# Patient Record
Sex: FEMALE | Race: WHITE | NOT HISPANIC OR LATINO | Employment: OTHER | URBAN - METROPOLITAN AREA
[De-identification: names, ages, dates, MRNs, and addresses within clinical notes are randomized per-mention and may not be internally consistent; named-entity substitution may affect disease eponyms.]

---

## 2017-02-02 ENCOUNTER — TRANSFERRED RECORDS (OUTPATIENT)
Dept: HEALTH INFORMATION MANAGEMENT | Facility: CLINIC | Age: 72
End: 2017-02-02

## 2017-03-07 ENCOUNTER — TRANSFERRED RECORDS (OUTPATIENT)
Dept: HEALTH INFORMATION MANAGEMENT | Facility: CLINIC | Age: 72
End: 2017-03-07

## 2017-03-21 ENCOUNTER — TRANSFERRED RECORDS (OUTPATIENT)
Dept: HEALTH INFORMATION MANAGEMENT | Facility: CLINIC | Age: 72
End: 2017-03-21

## 2017-04-12 ENCOUNTER — HOSPITAL ENCOUNTER (OUTPATIENT)
Dept: MAMMOGRAPHY | Facility: CLINIC | Age: 72
Discharge: HOME | End: 2017-04-12
Attending: INTERNAL MEDICINE | Admitting: INTERNAL MEDICINE
Payer: MEDICARE

## 2017-04-12 ENCOUNTER — OFFICE VISIT (OUTPATIENT)
Dept: INTERNAL MEDICINE | Facility: CLINIC | Age: 72
End: 2017-04-12
Payer: COMMERCIAL

## 2017-04-12 VITALS
DIASTOLIC BLOOD PRESSURE: 68 MMHG | BODY MASS INDEX: 30.7 KG/M2 | SYSTOLIC BLOOD PRESSURE: 118 MMHG | HEART RATE: 70 BPM | OXYGEN SATURATION: 97 % | HEIGHT: 66 IN | TEMPERATURE: 97.7 F | WEIGHT: 191 LBS

## 2017-04-12 DIAGNOSIS — Z00.00 ROUTINE GENERAL MEDICAL EXAMINATION AT A HEALTH CARE FACILITY: Primary | ICD-10-CM

## 2017-04-12 DIAGNOSIS — E55.9 VITAMIN D DEFICIENCY: ICD-10-CM

## 2017-04-12 DIAGNOSIS — N39.0 RECURRENT UTI: ICD-10-CM

## 2017-04-12 DIAGNOSIS — N39.0 URINARY TRACT INFECTION WITHOUT HEMATURIA, SITE UNSPECIFIED: ICD-10-CM

## 2017-04-12 DIAGNOSIS — Z78.0 MENOPAUSE: ICD-10-CM

## 2017-04-12 DIAGNOSIS — M85.80 OSTEOPENIA: ICD-10-CM

## 2017-04-12 DIAGNOSIS — R82.90 NONSPECIFIC FINDING ON EXAMINATION OF URINE: ICD-10-CM

## 2017-04-12 DIAGNOSIS — Z12.31 VISIT FOR SCREENING MAMMOGRAM: ICD-10-CM

## 2017-04-12 DIAGNOSIS — E03.9 ACQUIRED HYPOTHYROIDISM: ICD-10-CM

## 2017-04-12 LAB
ALBUMIN SERPL-MCNC: 3.7 G/DL (ref 3.4–5)
ALBUMIN UR-MCNC: NEGATIVE MG/DL
ALP SERPL-CCNC: 76 U/L (ref 40–150)
ALT SERPL W P-5'-P-CCNC: 22 U/L (ref 0–50)
ANION GAP SERPL CALCULATED.3IONS-SCNC: 8 MMOL/L (ref 3–14)
APPEARANCE UR: ABNORMAL
AST SERPL W P-5'-P-CCNC: 17 U/L (ref 0–45)
BACTERIA #/AREA URNS HPF: ABNORMAL /HPF
BILIRUB SERPL-MCNC: 0.5 MG/DL (ref 0.2–1.3)
BILIRUB UR QL STRIP: NEGATIVE
BUN SERPL-MCNC: 15 MG/DL (ref 7–30)
CALCIUM SERPL-MCNC: 9.8 MG/DL (ref 8.5–10.1)
CHLORIDE SERPL-SCNC: 104 MMOL/L (ref 94–109)
CHOLEST SERPL-MCNC: 184 MG/DL
CO2 SERPL-SCNC: 29 MMOL/L (ref 20–32)
COLOR UR AUTO: YELLOW
CREAT SERPL-MCNC: 0.9 MG/DL (ref 0.52–1.04)
ERYTHROCYTE [DISTWIDTH] IN BLOOD BY AUTOMATED COUNT: 14.1 % (ref 10–15)
GFR SERPL CREATININE-BSD FRML MDRD: 62 ML/MIN/1.7M2
GLUCOSE SERPL-MCNC: 96 MG/DL (ref 70–99)
GLUCOSE UR STRIP-MCNC: NEGATIVE MG/DL
HCT VFR BLD AUTO: 46.8 % (ref 35–47)
HDLC SERPL-MCNC: 74 MG/DL
HGB BLD-MCNC: 15.3 G/DL (ref 11.7–15.7)
HGB UR QL STRIP: NEGATIVE
KETONES UR STRIP-MCNC: NEGATIVE MG/DL
LDLC SERPL CALC-MCNC: 92 MG/DL
LEUKOCYTE ESTERASE UR QL STRIP: ABNORMAL
MCH RBC QN AUTO: 29 PG (ref 26.5–33)
MCHC RBC AUTO-ENTMCNC: 32.7 G/DL (ref 31.5–36.5)
MCV RBC AUTO: 89 FL (ref 78–100)
NITRATE UR QL: NEGATIVE
NONHDLC SERPL-MCNC: 110 MG/DL
PH UR STRIP: 6 PH (ref 5–7)
PLATELET # BLD AUTO: 252 10E9/L (ref 150–450)
POTASSIUM SERPL-SCNC: 4.7 MMOL/L (ref 3.4–5.3)
PROT SERPL-MCNC: 7.7 G/DL (ref 6.8–8.8)
RBC # BLD AUTO: 5.28 10E12/L (ref 3.8–5.2)
RBC #/AREA URNS AUTO: ABNORMAL /HPF (ref 0–2)
SODIUM SERPL-SCNC: 141 MMOL/L (ref 133–144)
SP GR UR STRIP: <=1.005 (ref 1–1.03)
TRIGL SERPL-MCNC: 92 MG/DL
TSH SERPL DL<=0.005 MIU/L-ACNC: 1 MU/L (ref 0.4–4)
URN SPEC COLLECT METH UR: ABNORMAL
UROBILINOGEN UR STRIP-ACNC: 0.2 EU/DL (ref 0.2–1)
WBC # BLD AUTO: 5.9 10E9/L (ref 4–11)
WBC #/AREA URNS AUTO: ABNORMAL /HPF (ref 0–2)

## 2017-04-12 PROCEDURE — 81001 URINALYSIS AUTO W/SCOPE: CPT | Performed by: INTERNAL MEDICINE

## 2017-04-12 PROCEDURE — G0202 SCR MAMMO BI INCL CAD: HCPCS

## 2017-04-12 PROCEDURE — 87186 SC STD MICRODIL/AGAR DIL: CPT | Performed by: INTERNAL MEDICINE

## 2017-04-12 PROCEDURE — 99397 PER PM REEVAL EST PAT 65+ YR: CPT | Performed by: INTERNAL MEDICINE

## 2017-04-12 PROCEDURE — 36415 COLL VENOUS BLD VENIPUNCTURE: CPT | Performed by: INTERNAL MEDICINE

## 2017-04-12 PROCEDURE — 87088 URINE BACTERIA CULTURE: CPT | Performed by: INTERNAL MEDICINE

## 2017-04-12 PROCEDURE — 80053 COMPREHEN METABOLIC PANEL: CPT | Performed by: INTERNAL MEDICINE

## 2017-04-12 PROCEDURE — 82306 VITAMIN D 25 HYDROXY: CPT | Performed by: INTERNAL MEDICINE

## 2017-04-12 PROCEDURE — 84443 ASSAY THYROID STIM HORMONE: CPT | Performed by: INTERNAL MEDICINE

## 2017-04-12 PROCEDURE — 87086 URINE CULTURE/COLONY COUNT: CPT | Performed by: INTERNAL MEDICINE

## 2017-04-12 PROCEDURE — 85027 COMPLETE CBC AUTOMATED: CPT | Performed by: INTERNAL MEDICINE

## 2017-04-12 PROCEDURE — 80061 LIPID PANEL: CPT | Performed by: INTERNAL MEDICINE

## 2017-04-12 RX ORDER — LEVOTHYROXINE SODIUM 100 UG/1
100 TABLET ORAL DAILY
Qty: 90 TABLET | Refills: 3 | Status: SHIPPED | OUTPATIENT
Start: 2017-04-12 | End: 2017-09-05

## 2017-04-12 NOTE — NURSING NOTE
"Chief Complaint   Patient presents with     Wellness Visit     Fasting Px       Initial /68  Pulse 70  Temp 97.7  F (36.5  C) (Oral)  Ht 5' 6\" (1.676 m)  Wt 191 lb (86.6 kg)  LMP 07/22/1997  SpO2 97%  BMI 30.83 kg/m2 Estimated body mass index is 30.83 kg/(m^2) as calculated from the following:    Height as of this encounter: 5' 6\" (1.676 m).    Weight as of this encounter: 191 lb (86.6 kg).  Medication Reconciliation: complete   Rosalind Valadez MA      "

## 2017-04-12 NOTE — MR AVS SNAPSHOT
After Visit Summary   4/12/2017    Kierra Mcmullen    MRN: [id number]           Patient Information     Date Of Birth          1945        Visit Information        Provider Department      4/12/2017 8:00 AM Matt Whitney MD [facility]        Today's Diagnoses     Routine general medical examination at a health care facility    -  1    Acquired hypothyroidism        Osteopenia        Vitamin D deficiency        Menopause           Follow-ups after your visit        Your next 10 appointments already scheduled     May 23, 2017 10:00 AM CDT   New Patient Visit with Jacob Connell MD   [facility] ([facility])    [address]  [address]  [facility] [address] [phone]              Future tests that were ordered for you today     Open Future Orders        Priority Expected Expires Ordered    DX Hip/Pelvis/Spine Routine  4/12/2018 4/12/2017            Who to contact     If you have questions or need follow up information about today's clinic visit or your schedule please contact [facility] directly at [phone].  Normal or non-critical lab and imaging results will be communicated to you by MyChart, letter or phone within 4 business days after the clinic has received the results. If you do not hear from us within 7 days, please contact the clinic through [employer] or phone. If you have a critical or abnormal lab result, we will notify you by phone as soon as possible.  Submit refill requests through [employer] or call your pharmacy and they will forward the refill request to us. Please allow 3 business days for your refill to be completed.          Additional Information About Your Visit        MyChart Information     [employer] gives you secure access to your electronic health record. If you see a primary care provider, you can also send messages to your care team and make appointments.  "If you have questions, please call your primary care clinic.  If you do not have a primary care provider, please call [phone] and they will assist you.        Care EveryWhere ID     This is your Care EveryWhere ID. This could be used by other organizations to access your [place] medical records  [id number]        Your Vitals Were     Pulse Temperature Height Last Period Pulse Oximetry BMI (Body Mass Index)    70 97.7  F (36.5  C) (Oral) 5' 6\" (1.676 m) 07/22/1997 97% 30.83 kg/m2       Blood Pressure from Last 3 Encounters:   04/12/17 118/68   05/05/16 101/62   04/26/16 118/76    Weight from Last 3 Encounters:   04/12/17 191 lb (86.6 kg)   04/26/16 187 lb 6.4 oz (85 kg)   07/09/15 186 lb (84.4 kg)              We Performed the Following     *UA reflex to Microscopic and Culture ([place] and [facility] (except [place] and [place])     CBC with platelets     Comprehensive metabolic panel     Lipid panel reflex to direct LDL     TSH with free T4 reflex     Vitamin D Deficiency          Where to get your medicines      Some of these will need a paper prescription and others can be bought over the counter.  Ask your nurse if you have questions.     Bring a paper prescription for each of these medications     levothyroxine 100 MCG tablet          Primary Care Provider Office Phone # Fax #    Matt Whitney [phone]       [facility] [address] [place] [address]        Thank you!     Thank you for choosing [facility]  for your care. Our goal is always to provide you with excellent care. Hearing back from our patients is one way we can continue to improve our services. Please take a few minutes to complete the written survey that you may receive in the mail after your visit with us. Thank you!             Your Updated Medication List - Protect others around you: Learn how to safely use, store and throw away your medicines at " www.disposemymeds.org.          This list is accurate as of: 4/12/17  8:37 AM.  Always use your most recent med list.                   Brand Name Dispense Instructions for use    Biotin 5000 MCG Caps          calcium + D 600-200 MG-UNIT Tabs   Generic drug:  calcium carbonate-vitamin D     30    1 TABLET DAILY       calcium polycarbophil 625 MG tablet    FIBERCON     Take 2 tablets by mouth daily       GLUCOSAMINE CHONDROITIN COMPLX PO      Take 2 tablets by mouth daily. 750/600 .       levothyroxine 100 MCG tablet    SYNTHROID/LEVOTHROID    90 tablet    Take 1 tablet (100 mcg) by mouth daily       MULTI-DAY Tabs      one PO QD       vitamin D 2000 UNITS tablet     100 tablet    Take 2,000 Units by mouth daily

## 2017-04-12 NOTE — PROGRESS NOTES
SUBJECTIVE:                                                            Kierra Mcmullen is a 72 year old female who presents for Preventive Visit.      Are you in the first 12 months of your Medicare coverage?  No    Physical   Annual:     Getting at least 3 servings of Calcium per day::  Yes    Bi-annual eye exam::  Yes    Dental care twice a year::  Yes    Sleep apnea or symptoms of sleep apnea::  None    Diet::  Regular (no restrictions)    Frequency of exercise::  6-7 days/week    Duration of exercise::  30-45 minutes    Taking medications regularly::  Yes    Medication side effects::  None    Additional concerns today::  YES      COGNITIVE SCREEN  1) Repeat 3 items (Banana, Sunrise, Chair)    2) Clock draw: NORMAL, abnormal time in the clock  3) 3 item recall: Recalls 3 objects  Results: Recall 3 objects but clock time abnormal    Mini-CogTM Copyright S Marshall. Licensed by the author for use in [facility]; reprinted with permission (mariana@[facility]). All rights reserved.        Reviewed and updated as needed this visit by clinical staff         Reviewed and updated as needed this visit by Provider        Social History   Substance Use Topics     Smoking status: Former Smoker     Packs/day: 1.00     Years: 25.00     Types: Cigarettes     Quit date: 1/1/1987     Smokeless tobacco: Never Used      Comment: 1985     Alcohol use 6.0 oz/week      Comment: 2-3 glasses of wine per wk       The patient does not drink >3 drinks per day nor >7 drinks per week.        PROBLEMS TO ADD ON...    Has hypothyroidism. On Synthroid. No heat /cold intolerance, heart palpitations, weight loss/ gain , heart palpitations, change in bowel habits.  Has h/o osteopenia. Has stopped vit D, takes calcium.     Today's PHQ-2 Score: 0  PHQ-2 ( 1999 Pfizer) 4/11/2017   Little interest or pleasure in doing things Not at all   Feeling down, depressed or hopeless Not at all   PHQ-2 Score 0       Do you feel safe in your environment -  Yes    Do you have a Health Care Directive?: Yes: Advance Directive has been received and scanned.    Current providers sharing in care for this patient include:   Patient Care Team:  Matt Whitney MD as PCP - General (Internal Medicine)      Hearing impairment: No    Ability to successfully perform activities of daily living: Yes, no assistance needed     Fall risk:  Fallen 2 or more times in the past year?: No  Any fall with injury in the past year?: No    Home safety:  none identified      The following health maintenance items are reviewed in Epic and correct as of today:  Health Maintenance   Topic Date Due     HEPATITIS C SCREENING  04/12/1963     TSH Q1 YEAR (NO INBASKET)  04/26/2017     FALL RISK ASSESSMENT  04/26/2017     MAMMO Q1 YR INBASKET MESSAGE  04/26/2017     INFLUENZA VACCINE (SYSTEM ASSIGNED)  09/01/2017     ADVANCE DIRECTIVE PLANNING Q5 YRS (NO INBASKET)  06/19/2020     LIPID SCREEN Q5 YR FEMALE (SYSTEM ASSIGNED)  04/26/2021     COLONOSCOPY Q5 YR INBASKET MESSAGE  05/05/2021     TETANUS Q10 YR  10/01/2022     DEXA SCAN SCREENING (SYSTEM ASSIGNED)  Completed     PNEUMOCOCCAL  Completed              ROS:  C: NEGATIVE for fever, chills, change in weight  I: NEGATIVE for worrisome rashes, moles or lesions  E: NEGATIVE for vision changes or irritation  E/M: NEGATIVE for ear, mouth and throat problems  R: NEGATIVE for significant cough or SOB  B: NEGATIVE for masses, tenderness or discharge  CV: NEGATIVE for chest pain, palpitations or peripheral edema  GI: NEGATIVE for nausea, abdominal pain, heartburn, or change in bowel habits  : NEGATIVE for frequency, dysuria, or hematuria  M: NEGATIVE for significant arthralgias or myalgia  N: NEGATIVE for weakness, dizziness or paresthesias  E: NEGATIVE for temperature intolerance, skin/hair changes  H: NEGATIVE for bleeding problems  P: NEGATIVE for changes in mood or affect    Problem list, Medication list, Allergies, and Medical/Social/Surgical  "histories reviewed in EPIC and updated as appropriate.  OBJECTIVE:                                                            LMP 07/22/1997 Estimated body mass index is 30.71 kg/(m^2) as calculated from the following:    Height as of 4/26/16: 5' 5.5\" (1.664 m).    Weight as of 4/26/16: 187 lb 6.4 oz (85 kg).  EXAM:   GENERAL: healthy, alert and no distress  EYES: Eyes grossly normal to inspection, PERRL and conjunctivae and sclerae normal  HENT: ear canals and TM's normal, nose and mouth without ulcers or lesions  NECK: no adenopathy, no asymmetry, masses, or scars and thyroid normal to palpation  RESP: lungs clear to auscultation - no rales, rhonchi or wheezes  BREAST: normal without masses, tenderness or nipple discharge and no palpable axillary masses or adenopathy  CV: regular rate and rhythm, normal S1 S2, no S3 or S4, no murmur, click or rub, no peripheral edema and peripheral pulses strong  ABDOMEN: soft, nontender, no hepatosplenomegaly, no masses and bowel sounds normal  MS: no gross musculoskeletal defects noted, no edema  SKIN: no suspicious lesions or rashes  NEURO: Normal strength and tone, mentation intact and speech normal  PSYCH: mentation appears normal, affect normal/bright    ASSESSMENT / PLAN:                                                                ICD-10-CM    1. Routine general medical examination at a health care facility Z00.00 levothyroxine (SYNTHROID/LEVOTHROID) 100 MCG tablet     Lipid panel reflex to direct LDL     CBC with platelets     Comprehensive metabolic panel     TSH with free T4 reflex     Vitamin D Deficiency     *UA reflex to Microscopic and Culture ([facility] (except [place] and [place])     DX Hip/Pelvis/Spine   2. Acquired hypothyroidism E03.9 CBC with platelets     Comprehensive metabolic panel     TSH with free T4 reflex     *UA reflex to Microscopic and Culture ([facility] (except [place] and [place])     DX " "Hip/Pelvis/Spine   3. Osteopenia M85.80 Vitamin D Deficiency     DX Hip/Pelvis/Spine   4. Vitamin D deficiency E55.9 Vitamin D Deficiency     DX Hip/Pelvis/Spine   5. Menopause Z78.0 DX Hip/Pelvis/Spine       End of Life Planning:  Patient currently has an advanced directive: Yes.  Practitioner is supportive of decision.    COUNSELING:  Reviewed preventive health counseling, as reflected in patient instructions       Regular exercise       Healthy diet/nutrition       Vision screening       Hearing screening       Dental care       Colon cancer screening        Estimated body mass index is 30.71 kg/(m^2) as calculated from the following:    Height as of 4/26/16: 5' 5.5\" (1.664 m).    Weight as of 4/26/16: 187 lb 6.4 oz (85 kg).     reports that she quit smoking about 30 years ago. Her smoking use included Cigarettes. She has a 25.00 pack-year smoking history. She has never used smokeless tobacco.      Appropriate preventive services were discussed with this patient, including applicable screening as appropriate for cardiovascular disease, diabetes, osteopenia/osteoporosis, and glaucoma.  As appropriate for age/gender, discussed screening for colorectal cancer, prostate cancer, breast cancer, and cervical cancer. Checklist reviewing preventive services available has been given to the patient.    Reviewed patients plan of care and provided an AVS. The Intermediate Care Plan ( asthma action plan, low back pain action plan, and migraine action plan) for Kierra meets the Care Plan requirement. This Care Plan has been established and reviewed with the Patient.    Counseling Resources:  ATP IV Guidelines  Pooled Cohorts Equation Calculator  Breast Cancer Risk Calculator  FRAX Risk Assessment  ICSI Preventive Guidelines  Dietary Guidelines for Americans, 2010  USDA's MyPlate  ASA Prophylaxis  Lung CA Screening    Matt Whitney MD  [facility]  Answers for HPI/ROS submitted by the patient on 4/11/2017 "   Q1: Little interest or pleasure in doing things: 0=Not at all  Q2: Feeling down, depressed or hopeless: 0=Not at all  PHQ-2 Score: 0

## 2017-04-13 LAB — DEPRECATED CALCIDIOL+CALCIFEROL SERPL-MC: 49 UG/L (ref 20–75)

## 2017-04-14 ENCOUNTER — TELEPHONE (OUTPATIENT)
Dept: INTERNAL MEDICINE | Facility: CLINIC | Age: 72
End: 2017-04-14

## 2017-04-14 DIAGNOSIS — N39.0 RECURRENT UTI: ICD-10-CM

## 2017-04-14 LAB
BACTERIA SPEC CULT: ABNORMAL
MICRO REPORT STATUS: ABNORMAL
MICROORGANISM SPEC CULT: ABNORMAL
SPECIMEN SOURCE: ABNORMAL

## 2017-04-14 RX ORDER — CEFUROXIME AXETIL 500 MG/1
500 TABLET ORAL 2 TIMES DAILY
Qty: 20 TABLET | Refills: 0 | Status: SHIPPED | OUTPATIENT
Start: 2017-04-14 | End: 2017-04-14

## 2017-04-14 RX ORDER — CEFUROXIME AXETIL 500 MG/1
500 TABLET ORAL 2 TIMES DAILY
Qty: 20 TABLET | Refills: 0 | Status: SHIPPED | OUTPATIENT
Start: 2017-04-14 | End: 2017-06-19

## 2017-04-14 NOTE — TELEPHONE ENCOUNTER
From 4/13/17 dictation in results notes:  She does have some Cefuroxime 500 mg x 5 days.  She would like a prescription to take out of town with her when the C & S come back.    Pt calling in asking if the Cefuroxime is the correct medication to be taking. She does have one more refill on Cefuroxime left.   [place]aving [place]. Is currently taking the Cefuroxime.  Ok to leave detailed message on cell phone.  Provider please review and advise.

## 2017-05-17 ENCOUNTER — TRANSFERRED RECORDS (OUTPATIENT)
Dept: HEALTH INFORMATION MANAGEMENT | Facility: CLINIC | Age: 72
End: 2017-05-17

## 2017-05-19 ASSESSMENT — ENCOUNTER SYMPTOMS
SINUS PAIN: 0
TASTE DISTURBANCE: 0
DYSURIA: 1
DIFFICULTY URINATING: 0
HEMATURIA: 0
FLANK PAIN: 1
TROUBLE SWALLOWING: 0
SINUS CONGESTION: 1
SMELL DISTURBANCE: 0
HOARSE VOICE: 0
NECK MASS: 0
SORE THROAT: 0

## 2017-05-22 ENCOUNTER — RADIANT APPOINTMENT (OUTPATIENT)
Dept: BONE DENSITY | Facility: CLINIC | Age: 72
End: 2017-05-22
Attending: INTERNAL MEDICINE
Payer: COMMERCIAL

## 2017-05-22 DIAGNOSIS — E55.9 VITAMIN D DEFICIENCY: ICD-10-CM

## 2017-05-22 DIAGNOSIS — Z78.0 MENOPAUSE: ICD-10-CM

## 2017-05-22 DIAGNOSIS — M85.80 OSTEOPENIA: ICD-10-CM

## 2017-05-22 DIAGNOSIS — E03.9 ACQUIRED HYPOTHYROIDISM: ICD-10-CM

## 2017-05-22 DIAGNOSIS — Z00.00 ROUTINE GENERAL MEDICAL EXAMINATION AT A HEALTH CARE FACILITY: ICD-10-CM

## 2017-05-22 PROCEDURE — 77080 DXA BONE DENSITY AXIAL: CPT | Performed by: INTERNAL MEDICINE

## 2017-05-23 ENCOUNTER — OFFICE VISIT (OUTPATIENT)
Dept: UROLOGY | Facility: CLINIC | Age: 72
End: 2017-05-23
Payer: COMMERCIAL

## 2017-05-23 ENCOUNTER — HOSPITAL ENCOUNTER (OUTPATIENT)
Dept: ULTRASOUND IMAGING | Facility: CLINIC | Age: 72
Discharge: HOME | End: 2017-05-23
Attending: UROLOGY | Admitting: UROLOGY
Payer: MEDICARE

## 2017-05-23 ENCOUNTER — HOSPITAL ENCOUNTER (OUTPATIENT)
Dept: GENERAL RADIOLOGY | Facility: CLINIC | Age: 72
Discharge: HOME | End: 2017-05-23
Attending: UROLOGY | Admitting: UROLOGY
Payer: MEDICARE

## 2017-05-23 VITALS
HEART RATE: 80 BPM | DIASTOLIC BLOOD PRESSURE: 80 MMHG | HEIGHT: 66 IN | SYSTOLIC BLOOD PRESSURE: 118 MMHG | WEIGHT: 190 LBS | BODY MASS INDEX: 30.53 KG/M2

## 2017-05-23 DIAGNOSIS — Z87.440 PERSONAL HISTORY OF URINARY TRACT INFECTION: ICD-10-CM

## 2017-05-23 DIAGNOSIS — Z87.440 PERSONAL HISTORY OF URINARY TRACT INFECTION: Primary | ICD-10-CM

## 2017-05-23 LAB
ALBUMIN UR-MCNC: NEGATIVE MG/DL
APPEARANCE UR: CLEAR
BILIRUB UR QL STRIP: NEGATIVE
COLOR UR AUTO: YELLOW
GLUCOSE UR STRIP-MCNC: NEGATIVE MG/DL
HGB UR QL STRIP: ABNORMAL
KETONES UR STRIP-MCNC: NEGATIVE MG/DL
LEUKOCYTE ESTERASE UR QL STRIP: ABNORMAL
NITRATE UR QL: NEGATIVE
PH UR STRIP: 7 PH (ref 5–7)
SP GR UR STRIP: 1.01 (ref 1–1.03)
URN SPEC COLLECT METH UR: ABNORMAL
UROBILINOGEN UR STRIP-ACNC: 0.2 EU/DL (ref 0.2–1)

## 2017-05-23 PROCEDURE — 81003 URINALYSIS AUTO W/O SCOPE: CPT | Performed by: UROLOGY

## 2017-05-23 PROCEDURE — 76770 US EXAM ABDO BACK WALL COMP: CPT

## 2017-05-23 PROCEDURE — 99202 OFFICE O/P NEW SF 15 MIN: CPT | Performed by: UROLOGY

## 2017-05-23 RX ORDER — INFLUENZA A VIRUS A/VICTORIA/4897/2022 IVR-238 (H1N1) ANTIGEN (FORMALDEHYDE INACTIVATED), INFLUENZA A VIRUS A/CALIFORNIA/122/2022 SAN-022 (H3N2) ANTIGEN (FORMALDEHYDE INACTIVATED), AND INFLUENZA B VIRUS B/MICHIGAN/01/2021 ANTIGEN (FORMALDEHYDE INACTIVATED) 60; 60; 60 UG/.5ML; UG/.5ML; UG/.5ML
INJECTION, SUSPENSION INTRAMUSCULAR
Refills: 0 | COMMUNITY
Start: 2016-09-14 | End: 2017-06-19

## 2017-05-23 ASSESSMENT — PAIN SCALES - GENERAL: PAINLEVEL: NO PAIN (0)

## 2017-05-23 NOTE — LETTER
5/23/2017       RE: Kierra Mcmullen  [address] [place] [address]     Dear Colleague,    Thank you for referring your patient, Kierra Mcmullen, to the [facility] [place] [facility]. Please see a copy of my visit note below.    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 She has no known history of stones but has not had any imaging. 8 years ago she was seen with recurrent Escherichia coli cystitis and I recommended estrogen vaginal cream to help with bacterial colonization. Unfortunately, she never followed this recommendation. With her infections, she's had no pain, fever or hematuria. She has no difficulty voiding.  Other past medical history: Adenomatous polyp of colon, hypothyroidism, osteopenia, hysterectomy, former smoker  Medications: Biotin, calcium, vitamin D, Fluzone, glucosamine/chondroitin, Synthroid, multivitamin  Allergies: Sulfa  Exam: Normal appearance, normal vital signs, alert and oriented, normocephalic, normal respirations, neuro grossly intact  Urinalysis: Small leukocytes only  Assessment: Recurrent Klebsiella UTIs-rule out stones  Plan: KUB, renal ultrasound, office cystoscopy           I've asked the patient to see Xavier Austin M.D. who can recommend vaginal estrogen replacement-Estrace cream versus vaginal estrogen suppositories-I see this as a major indication since she probably has bacterial colonization. All discussed with patient and her  today    Again, thank you for allowing me to participate in the care of your patient.      Sincerely,  Jacob Connell MD

## 2017-05-23 NOTE — LETTER
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 She has no known history of stones but has not had any imaging. 8 years ago she was seen with recurrent Escherichia coli cystitis and I recommended estrogen vaginal cream to help with bacterial colonization. Unfortunately, she never followed this recommendation. With her infections, she's had no pain, fever or hematuria. She has no difficulty voiding.  Other past medical history: Adenomatous polyp of colon, hypothyroidism, osteopenia, hysterectomy, former smoker  Medications: Biotin, calcium, vitamin D, Fluzone, glucosamine/chondroitin, Synthroid, multivitamin  Allergies: Sulfa  Exam: Normal appearance, normal vital signs, alert and oriented, normocephalic, normal respirations, neuro grossly intact  Urinalysis: Small leukocytes only  Assessment: Recurrent Klebsiella UTIs-rule out stones  Plan: KUB, renal ultrasound, office cystoscopy           I've asked the patient to see Xavier Autsin M.D. who can recommend vaginal estrogen replacement-Estrace cream versus vaginal estrogen suppositories-I see this as a major indication since she probably has bacterial colonization. All discussed with patient and her  today    Jacob Connell MD

## 2017-05-23 NOTE — MR AVS SNAPSHOT
After Visit Summary   5/23/2017    Kierra Mcmullen    MRN: [id number]           Patient Information     Date Of Birth          1945        Visit Information        Provider Department      5/23/2017 10:00 AM Jacob Connell MD [facility]        Today's Diagnoses     Personal history of urinary tract infection    -  1       Follow-ups after your visit        Your next 10 appointments already scheduled     May 23, 2017 10:50 AM CDT   XR KUB with [email or web address]   [facility])    [address]  [facility] [address]   [phone]           Please bring a list of your current medicines to your exam. (Include vitamins, minerals and over-thecounter medicines.) Leave your valuables at home.  Tell your doctor if there is a chance you may be pregnant.  You do not need to do anything special for this exam.            May 23, 2017 11:00 AM CDT   US RENAL COMPLETE with [email or web address]   [facility] ([facility])    [address]  [facility] [address] [phone]           Please bring a list of your medicines (including vitamins, minerals and over-the-counter drugs). Also, tell your doctor about any allergies you may have. Wear comfortable clothes and leave your valuables at home.  You do not need to do anything special to prepare for your exam.  Please call the Imaging Department at your exam site with any questions.            Jun 08, 2017  8:50 AM CDT   Cystoscopy with Jacob Connell MD, UB CYF   [facility] ([facility])    [address]  [address]  [facility] [address]   [phone]              Future tests that were ordered for you today     Open Future Orders        Priority Expected Expires Ordered    XR KUB [[id number]] Routine  5/23/2018 5/23/2017  "   US Renal Complete [[id number]] Routine  5/23/2018 5/23/2017            Who to contact     If you have questions or need follow up information about today's clinic visit or your schedule please contact [facility] [place] directly at [phone].  Normal or non-critical lab and imaging results will be communicated to you by MyChart, letter or phone within 4 business days after the clinic has received the results. If you do not hear from us within 7 days, please contact the clinic through [employer] or phone. If you have a critical or abnormal lab result, we will notify you by phone as soon as possible.  Submit refill requests through [employer] or call your pharmacy and they will forward the refill request to us. Please allow 3 business days for your refill to be completed.          Additional Information About Your Visit        MyChart Information     [employer] gives you secure access to your electronic health record. If you see a primary care provider, you can also send messages to your care team and make appointments. If you have questions, please call your primary care clinic.  If you do not have a primary care provider, please call [phone] and they will assist you.        Care EveryWhere ID     This is your Care EveryWhere ID. This could be used by other organizations to access your [place] medical records  [id number]        Your Vitals Were     Pulse Height Last Period BMI (Body Mass Index)          80 1.664 m (5' 5.5\") 07/22/1997 31.14 kg/m2         Blood Pressure from Last 3 Encounters:   05/23/17 118/80   04/12/17 118/68   05/05/16 101/62    Weight from Last 3 Encounters:   05/23/17 86.2 kg (190 lb)   04/12/17 86.6 kg (191 lb)   04/26/16 85 kg (187 lb 6.4 oz)              We Performed the Following     UA without Microscopic [[id number]]        Primary Care Provider Office Phone # Fax #    Matt Whitney [phone]       [facility] [address]  [facility] [address]        Thank you!     Thank you for choosing [facility] [place]  for your care. Our goal is always to provide you with excellent care. Hearing back from our patients is one way we can continue to improve our services. Please take a few minutes to complete the written survey that you may receive in the mail after your visit with us. Thank you!             Your Updated Medication List - Protect others around you: Learn how to safely use, store and throw away your medicines at www.disposemymeds.org.          This list is accurate as of: 5/23/17 10:40 AM.  Always use your most recent med list.                   Brand Name Dispense Instructions for use    Biotin 5000 MCG Caps          calcium + D 600-200 MG-UNIT Tabs   Generic drug:  calcium carbonate-vitamin D     30    1 TABLET DAILY       calcium polycarbophil 625 MG tablet    FIBERCON     Take 2 tablets by mouth daily       cefuroxime 500 MG tablet    CEFTIN    20 tablet    Take 1 tablet (500 mg) by mouth 2 times daily       FLUZONE HIGH-DOSE 0.5 ML injection   Generic drug:  influenza Vac Split High-Dose      ADM 0.5ML IM UTD       GLUCOSAMINE CHONDROITIN COMPLX PO      Take 2 tablets by mouth daily. 750/600 .       levothyroxine 100 MCG tablet    SYNTHROID/LEVOTHROID    90 tablet    Take 1 tablet (100 mcg) by mouth daily       MULTI-DAY Tabs      one PO QD       vitamin D 2000 UNITS tablet     100 tablet    Take 2,000 Units by mouth daily Reported on 5/23/2017

## 2017-05-23 NOTE — PROGRESS NOTES
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 She has no known history of stones but has not had any imaging. 8 years ago she was seen with recurrent Escherichia coli cystitis and I recommended estrogen vaginal cream to help with bacterial colonization. Unfortunately, she never followed this recommendation. With her infections, she's had no pain, fever or hematuria. She has no difficulty voiding.  Other past medical history: Adenomatous polyp of colon, hypothyroidism, osteopenia, hysterectomy, former smoker  Medications: Biotin, calcium, vitamin D, Fluzone, glucosamine/chondroitin, Synthroid, multivitamin  Allergies: Sulfa  Exam: Normal appearance, normal vital signs, alert and oriented, normocephalic, normal respirations, neuro grossly intact  Urinalysis: Small leukocytes only  Assessment: Recurrent Klebsiella UTIs-rule out stones  Plan: KUB, renal ultrasound, office cystoscopy           I've asked the patient to see Xavier Austin M.D. who can recommend vaginal estrogen replacement-Estrace cream versus vaginal estrogen suppositories-I see this as a major indication since she probably has bacterial colonization. All discussed with patient and her  today

## 2017-06-19 ENCOUNTER — OFFICE VISIT (OUTPATIENT)
Dept: INTERNAL MEDICINE | Facility: CLINIC | Age: 72
End: 2017-06-19
Payer: COMMERCIAL

## 2017-06-19 VITALS
HEIGHT: 66 IN | DIASTOLIC BLOOD PRESSURE: 82 MMHG | WEIGHT: 193 LBS | BODY MASS INDEX: 31.02 KG/M2 | SYSTOLIC BLOOD PRESSURE: 122 MMHG | OXYGEN SATURATION: 97 % | HEART RATE: 67 BPM

## 2017-06-19 DIAGNOSIS — H10.31 ACUTE BACTERIAL CONJUNCTIVITIS OF RIGHT EYE: Primary | ICD-10-CM

## 2017-06-19 DIAGNOSIS — R82.90 CLOUDY URINE: ICD-10-CM

## 2017-06-19 LAB
ALBUMIN UR-MCNC: NEGATIVE MG/DL
APPEARANCE UR: ABNORMAL
BACTERIA #/AREA URNS HPF: ABNORMAL /HPF
BILIRUB UR QL STRIP: NEGATIVE
COLOR UR AUTO: YELLOW
GLUCOSE UR STRIP-MCNC: NEGATIVE MG/DL
HGB UR QL STRIP: NEGATIVE
KETONES UR STRIP-MCNC: NEGATIVE MG/DL
LEUKOCYTE ESTERASE UR QL STRIP: ABNORMAL
NITRATE UR QL: NEGATIVE
NON-SQ EPI CELLS #/AREA URNS LPF: ABNORMAL /LPF
PH UR STRIP: 5.5 PH (ref 5–7)
RBC #/AREA URNS AUTO: ABNORMAL /HPF (ref 0–2)
SP GR UR STRIP: 1.01 (ref 1–1.03)
URN SPEC COLLECT METH UR: ABNORMAL
UROBILINOGEN UR STRIP-ACNC: 0.2 EU/DL (ref 0.2–1)
WBC #/AREA URNS AUTO: ABNORMAL /HPF (ref 0–2)

## 2017-06-19 PROCEDURE — 99213 OFFICE O/P EST LOW 20 MIN: CPT | Performed by: INTERNAL MEDICINE

## 2017-06-19 PROCEDURE — 81001 URINALYSIS AUTO W/SCOPE: CPT | Performed by: INTERNAL MEDICINE

## 2017-06-19 RX ORDER — TOBRAMYCIN 3 MG/ML
1 SOLUTION/ DROPS OPHTHALMIC EVERY 4 HOURS
Qty: 1 BOTTLE | Refills: 0 | Status: SHIPPED | OUTPATIENT
Start: 2017-06-19 | End: 2017-06-26

## 2017-06-19 NOTE — NURSING NOTE
"Chief Complaint   Patient presents with     Eye Problem     Pink eye     Urinary Problem     Cloudy urine       Initial /82  Pulse 67  Ht 5' 5.5\" (1.664 m)  Wt 193 lb (87.5 kg)  LMP 07/22/1997  SpO2 97%  BMI 31.63 kg/m2 Estimated body mass index is 31.63 kg/(m^2) as calculated from the following:    Height as of this encounter: 5' 5.5\" (1.664 m).    Weight as of this encounter: 193 lb (87.5 kg).  Medication Reconciliation: complete   Rosalind Valadez MA      "

## 2017-06-19 NOTE — MR AVS SNAPSHOT
After Visit Summary   6/19/2017    Kierra Mcmullen    MRN: [id number]           Patient Information     Date Of Birth          1945        Visit Information        Provider Department      6/19/2017 11:20 AM Matt Whitney MD [facility]        Today's Diagnoses     Acute bacterial conjunctivitis of right eye    -  1    Cloudy urine           Follow-ups after your visit        Your next 10 appointments already scheduled     Jun 27, 2017  1:30 PM CDT   [facility]    [address]  [facility] [id number] [phone]           Bring a current list of meds and any records pertaining to this visit.  For Physicals, please bring immunization records and any forms needing to be filled out.  Please arrive 10 minutes early to complete paperwork.            Jul 05, 2017  9:20 AM CDT   Cystoscopy with Jacob Connell MD, UB CYF   [facility]    [address] [facility]  [address]  [facility] [id number] [phone]              Who to contact     If you have questions or need follow up information about today's clinic visit or your schedule please contact [facility] directly at [phone].  Normal or non-critical lab and imaging results will be communicated to you by MyChart, letter or phone within 4 business days after the clinic has received the results. If you do not hear from us within 7 days, please contact the clinic through MyChart or phone. If you have a critical or abnormal lab result, we will notify you by phone as soon as possible.  Submit refill requests through [employer] or call your pharmacy and they will forward the refill request to us. Please allow 3 business days for your refill to be completed.          Additional Information About Your Visit        MyChart Information      "tab ticketbroker gives you secure access to your electronic health record. If you see a primary care provider, you can also send messages to your care team and make appointments. If you have questions, please call your primary care clinic.  If you do not have a primary care provider, please call [phone] and they will assist you.        Care EveryWhere ID     This is your Care EveryWhere ID. This could be used by other organizations to access your [place] medical records  [id number]        Your Vitals Were     Pulse Height Last Period Pulse Oximetry BMI (Body Mass Index)       67 5' 5.5\" (1.664 m) 07/22/1997 97% 31.63 kg/m2        Blood Pressure from Last 3 Encounters:   06/19/17 122/82   05/23/17 118/80   04/12/17 118/68    Weight from Last 3 Encounters:   06/19/17 193 lb (87.5 kg)   05/23/17 190 lb (86.2 kg)   04/12/17 191 lb (86.6 kg)              We Performed the Following     UA reflex to Microscopic and Culture          Today's Medication Changes          These changes are accurate as of: 6/19/17 11:48 AM.  If you have any questions, ask your nurse or doctor.               Start taking these medicines.        Dose/Directions    tobramycin 0.3 % ophthalmic solution   Commonly known as:  TOBREX   Used for:  Acute bacterial conjunctivitis of right eye   Started by:  Matt Whitney MD        Dose:  1 drop   Apply 1 drop to eye every 4 hours for 7 days   Quantity:  1 Bottle   Refills:  0         These medicines have changed or have updated prescriptions.        Dose/Directions    calcium + D 600-200 MG-UNIT Tabs   This may have changed:  See the new instructions.   Generic drug:  calcium carbonate-vitamin D        1 TABLET DAILY   Quantity:  30   Refills:  0            Where to get your medicines      These medications were sent to [id number]  [place] [address] [facility] [address] [facility]  [address] [place] [address]     Phone:  [phone]     " tobramycin 0.3 % ophthalmic solution                Primary Care Provider Office Phone # Fax #    Matt Whitney [phone]       [facility] [address] [facility] [address]        Thank you!     Thank you for choosing [facility]  for your care. Our goal is always to provide you with excellent care. Hearing back from our patients is one way we can continue to improve our services. Please take a few minutes to complete the written survey that you may receive in the mail after your visit with us. Thank you!             Your Updated Medication List - Protect others around you: Learn how to safely use, store and throw away your medicines at www.disposemymeds.org.          This list is accurate as of: 6/19/17 11:48 AM.  Always use your most recent med list.                   Brand Name Dispense Instructions for use    Biotin 5000 MCG Caps          calcium + D 600-200 MG-UNIT Tabs   Generic drug:  calcium carbonate-vitamin D     30    1 TABLET DAILY       calcium polycarbophil 625 MG tablet    FIBERCON     Take 2 tablets by mouth daily       GLUCOSAMINE CHONDROITIN COMPLX PO      Take 2 tablets by mouth daily. 750/600 .       levothyroxine 100 MCG tablet    SYNTHROID/LEVOTHROID    90 tablet    Take 1 tablet (100 mcg) by mouth daily       MULTI-DAY Tabs      one PO QD       tobramycin 0.3 % ophthalmic solution    TOBREX    1 Bottle    Apply 1 drop to eye every 4 hours for 7 days

## 2017-06-19 NOTE — PROGRESS NOTES
SUBJECTIVE:                                                    Kierra Mcmullen is a 72 year old female who presents to clinic today for the following health issues:      Presents with complaints of right eye irritation, redness, mattering for 2 days. Mild pain, no eyesight change.  Has had changes in the urine with foul smell and cloudy appearance. No frequency, no burning, fever, flank pain. Has had UTI 3 months ago.   Will be seeing urology and GYN.         Problem list and histories reviewed & adjusted, as indicated.  Additional history: none    Patient Active Problem List   Diagnosis     Obesity     Prolapse of vaginal wall     Recurrent UTI     Advance Care Planning     Postmenopausal bleeding     Cystocele     Enterocele     Hypothyroidism     Osteopenia     Adenomatous polyp of colon     Degenerative arthritis of knee     Past Surgical History:   Procedure Laterality Date     ARTHROPLASTY KNEE Left 5/19/2015    Procedure: ARTHROPLASTY KNEE;  Surgeon: Daniel Mack MD;  Location:  OR     C NONSPECIFIC PROCEDURE      Cholecystectomy -- Abstracted 7/16/02     CHOLECYSTECTOMY       COLONOSCOPY N/A 5/5/2016    Procedure: COLONOSCOPY;  Surgeon: Sage Berg MD;  Location:  GI     CYSTOSCOPY  1/31/2012    Procedure:CYSTOSCOPY; Surgeon:GAMA GRIDER; Location:[facility] OR     DAVINCI HYSTERECTOMY SUPRACERVICAL, SACROCOLPOPEXY, COMBINED  1/31/2012    Procedure:COMBINED DAVINCI HYSTERECTOMY SUPRACERVICAL, SACROCOLPOPEXY; DAVINCI LAPAROSCOPIC SUPRACERVICAL HYSTERECTOMY, BILATERAL SALPINGO-OOPHORECTOMY, SACROCOLPOPEXY  WITH COLOPLASTY MESH AND CYSTOPLASTY; Surgeon:GAMA GRIDER; Location: OR     HC COLONOSCOPY THRU STOMA, DIAGNOSTIC  2006      REMOVAL OF TONSILS,<13 Y/O      Tonsils <12y.o.     HC TOOTH EXTRACTION W/FORCEP         Social History   Substance Use Topics     Smoking status: Former Smoker     Packs/day: 1.00     Years: 25.00     Types: Cigarettes     Quit date: 1/1/1987     Smokeless  "tobacco: Never Used      Comment:      Alcohol use 6.0 oz/week      Comment: 2-3 glasses of wine per wk     Family History   Problem Relation Age of Onset     Alzheimer Disease Father      CANCER Mother       non-Hodgkins lymphoma     OSTEOPOROSIS Mother      Arthritis Mother      RA     GASTROINTESTINAL DISEASE Mother      UC     OSTEOPOROSIS Sister      GASTROINTESTINAL DISEASE Sister      diverticulitis     Cardiovascular Maternal Aunt      MI     Breast Cancer Paternal Aunt          Current Outpatient Prescriptions   Medication Sig Dispense Refill     tobramycin (TOBREX) 0.3 % ophthalmic solution Apply 1 drop to eye every 4 hours for 7 days 1 Bottle 0     levothyroxine (SYNTHROID/LEVOTHROID) 100 MCG tablet Take 1 tablet (100 mcg) by mouth daily 90 tablet 3     calcium polycarbophil (FIBERCON) 625 MG tablet Take 2 tablets by mouth daily       Biotin 5000 MCG CAPS        GLUCOSAMINE CHONDROITIN COMPLX PO Take 2 tablets by mouth daily. 750/600 .        CALCIUM + D 600-200 MG-IU OR TABS 1 TABLET DAILY (Patient taking differently: 2 TABLETS DAILY) 30 0     MULTI-DAY OR TABS one PO QD         Reviewed and updated as needed this visit by clinical staff  Tobacco  Allergies  Meds       Reviewed and updated as needed this visit by Provider         ROS:  Constitutional, HEENT, cardiovascular, pulmonary, gi and gu systems are negative, except as otherwise noted.    OBJECTIVE:                                                    /82  Pulse 67  Ht 5' 5.5\" (1.664 m)  Wt 193 lb (87.5 kg)  LMP 1997  SpO2 97%  BMI 31.63 kg/m2  Body mass index is 31.63 kg/(m^2).  GENERAL: healthy, alert and no distress  EYES: Eyes grossly normal to inspection, PERRL and conjunctivae and sclerae normal  Right eye with conjunctival injection, yellow crusted discharge on eyelids   MS: no gross musculoskeletal defects noted, no edema    Diagnostic Test Results:  none      ASSESSMENT/PLAN:                                  "                     Problem List Items Addressed This Visit     None      Visit Diagnoses     Acute bacterial conjunctivitis of right eye    -  Primary    Relevant Medications    tobramycin (TOBREX) 0.3 % ophthalmic solution    Cloudy urine        Relevant Orders    UA reflex to Microscopic and Culture (Completed)           Start on ophthalmic antibiotic   Reassess if not improved   Assess for UTI, follow up with urology     Follow-Up:with results     Matt Whitney MD  [facility]

## 2017-06-27 ENCOUNTER — OFFICE VISIT (OUTPATIENT)
Dept: OBGYN | Facility: CLINIC | Age: 72
End: 2017-06-27
Payer: COMMERCIAL

## 2017-06-27 VITALS
BODY MASS INDEX: 31.08 KG/M2 | SYSTOLIC BLOOD PRESSURE: 120 MMHG | HEIGHT: 66 IN | WEIGHT: 193.4 LBS | DIASTOLIC BLOOD PRESSURE: 76 MMHG

## 2017-06-27 DIAGNOSIS — N81.10 PROLAPSE OF VAGINAL WALL: ICD-10-CM

## 2017-06-27 DIAGNOSIS — N39.0 RECURRENT UTI: Primary | ICD-10-CM

## 2017-06-27 DIAGNOSIS — K46.9 ENTEROCELE: ICD-10-CM

## 2017-06-27 DIAGNOSIS — Z71.89 ADVANCED DIRECTIVES, COUNSELING/DISCUSSION: Chronic | ICD-10-CM

## 2017-06-27 DIAGNOSIS — Z01.411 ENCOUNTER FOR GYNECOLOGICAL EXAMINATION WITH ABNORMAL FINDING: ICD-10-CM

## 2017-06-27 PROCEDURE — 99204 OFFICE O/P NEW MOD 45 MIN: CPT | Performed by: OBSTETRICS & GYNECOLOGY

## 2017-06-27 NOTE — NURSING NOTE
"Chief Complaint   Patient presents with     Consult     HRT referred by        Initial /76 (BP Location: Right arm, Patient Position: Chair, Cuff Size: Adult Regular)  Ht 5' 6\" (1.676 m)  Wt 193 lb 6.4 oz (87.7 kg)  LMP 1997  BMI 31.22 kg/m2 Estimated body mass index is 31.22 kg/(m^2) as calculated from the following:    Height as of this encounter: 5' 6\" (1.676 m).    Weight as of this encounter: 193 lb 6.4 oz (87.7 kg).  BP completed using cuff size: regular        The following HM Due: NONE      The following patient reported/Care Every where data was sent to:  P ABSTRACT QUALITY INITIATIVES [[id number]]       patient has appointment for today             "

## 2017-06-27 NOTE — MR AVS SNAPSHOT
After Visit Summary   6/27/2017    Kierra Mcmullen    MRN: [id number]           Patient Information     Date Of Birth          1945        Visit Information        Provider Department      6/27/2017 1:30 PM Xavier Austin MD [facility]        Today's Diagnoses     Recurrent UTI    -  1    Prolapse of vaginal wall        Advance Care Planning        Encounter for gynecological examination with abnormal finding        Enterocele          Care Instructions    You can reach your [place] Care Team any time of the day by calling [phone]. This number will put you in touch with the 24 hour nurse line if the clinic is closed.    To contact your OB/GYN Station Coordinator/Surgery Scheduler please call [phone]. This is a direct number for your care team between 8 a.m. and 4 p.m. Monday through Friday.    [facility] is open for your convenience:  Monday through Friday 8 a.m. to 6 p.m.  Closed weekends and all major holidays.            Follow-ups after your visit        Follow-up notes from your care team     Return in about 3 months (around 9/27/2017).      Your next 10 appointments already scheduled     Jul 05, 2017  9:20 AM CDT   Cystoscopy with Jacob Connell MD, UB CYF   [facility] ([facility])    [address]  [address]  [facility] [address] [phone]              Who to contact     If you have questions or need follow up information about today's clinic visit or your schedule please contact [facility] directly at [phone].  Normal or non-critical lab and imaging results will be communicated to you by MyChart, letter or phone within 4 business days after the clinic has received the results. If you do not hear from us within 7 days, please contact the clinic through MyChart or phone. If you have a critical or abnormal lab result, we will notify you by phone as  "soon as possible.  Submit refill requests through [employer] or call your pharmacy and they will forward the refill request to us. Please allow 3 business days for your refill to be completed.          Additional Information About Your Visit        [email or web address] Information     [employer] gives you secure access to your electronic health record. If you see a primary care provider, you can also send messages to your care team and make appointments. If you have questions, please call your primary care clinic.  If you do not have a primary care provider, please call [phone] and they will assist you.        Care EveryWhere ID     This is your Care EveryWhere ID. This could be used by other organizations to access your [place] medical records  [id number]        Your Vitals Were     Height Last Period BMI (Body Mass Index)             5' 6\" (1.676 m) 1997 31.22 kg/m2          Blood Pressure from Last 3 Encounters:   17 120/76   17 122/82   17 118/80    Weight from Last 3 Encounters:   17 193 lb 6.4 oz (87.7 kg)   17 193 lb (87.5 kg)   17 190 lb (86.2 kg)                 Today's Medication Changes          These changes are accurate as of: 17 11:59 PM.  If you have any questions, ask your nurse or doctor.               Start taking these medicines.        Dose/Directions    COMPOUND - PHARMACY TO MIX COMPOUNDED MEDICATION   Commonly known as:  CMPD RX   Used for:  Recurrent UTI   Started by:  Xavier Austin MD        Estradiol 0.011% in HRT heavy (cream base)  Place in tube and include vaginal applicator Si gm intravaginally at HS on Mon and Thurs pm as directed   Quantity:  42 g   Refills:  3         These medicines have changed or have updated prescriptions.        Dose/Directions    calcium + D 600-200 MG-UNIT Tabs   This may have changed:  See the new instructions.   Generic drug:  calcium carbonate-vitamin D        1 TABLET DAILY   Quantity:  30   Refills:  0          "   Where to get your medicines      Some of these will need a paper prescription and others can be bought over the counter.  Ask your nurse if you have questions.     Bring a paper prescription for each of these medications     COMPOUND - PHARMACY TO MIX COMPOUNDED MEDICATION                Primary Care Provider Office Phone # Fax #    Matt Whitney [phone]       [facility] [address] [facility] [address]        Equal Access to Services     SAGAR NIETO : Hadii aad ku hadasho Soomaali, waaxda luqadaha, qaybta kaalmada adeegyada, waxay idiin hayaan adeeg kharash lacheryl . So [facility] [phone].    ATENCIÓN: Si habla español, tiene a maxwell disposición servicios gratuitos de asistencia lingüística. Toya al [phone].    We comply with applicable federal civil rights laws and [place] laws. We do not discriminate on the basis of race, color, national origin, age, disability sex, sexual orientation or gender identity.            Thank you!     Thank you for choosing [facility]  for your care. Our goal is always to provide you with excellent care. Hearing back from our patients is one way we can continue to improve our services. Please take a few minutes to complete the written survey that you may receive in the mail after your visit with us. Thank you!             Your Updated Medication List - Protect others around you: Learn how to safely use, store and throw away your medicines at www.disposemymeds.org.          This list is accurate as of: 6/27/17 11:59 PM.  Always use your most recent med list.                   Brand Name Dispense Instructions for use Diagnosis    Biotin 5000 MCG Caps           calcium + D 600-200 MG-UNIT Tabs   Generic drug:  calcium carbonate-vitamin D     30    1 TABLET DAILY        calcium polycarbophil 625 MG tablet    FIBERCON     Take 2 tablets by mouth daily        COMPOUND - PHARMACY TO MIX COMPOUNDED MEDICATION    CMPD RX     42 g    Estradiol 0.011% in HRT heavy (cream base)  Place in tube and include vaginal applicator Si gm intravaginally at HS on Mon and Thurs pm as directed    Recurrent UTI       GLUCOSAMINE CHONDROITIN COMPLX PO      Take 2 tablets by mouth daily. 750/600 .        levothyroxine 100 MCG tablet    SYNTHROID/LEVOTHROID    90 tablet    Take 1 tablet (100 mcg) by mouth daily    Routine general medical examination at a health care facility       MULTI-DAY Tabs      one PO QD

## 2017-06-29 NOTE — PATIENT INSTRUCTIONS
You can reach your [place] Care Team any time of the day by calling [phone]. This number will put you in touch with the 24 hour nurse line if the clinic is closed.    To contact your OB/GYN Station Coordinator/Surgery Scheduler please call [phone]. This is a direct number for your care team between 8 a.m. and 4 p.m. Monday through Friday.    [facility] is open for your convenience:  Monday through Friday 8 a.m. to 6 p.m.  Closed weekends and all major holidays.

## 2017-07-05 ENCOUNTER — OFFICE VISIT (OUTPATIENT)
Dept: UROLOGY | Facility: CLINIC | Age: 72
End: 2017-07-05
Payer: COMMERCIAL

## 2017-07-05 VITALS — OXYGEN SATURATION: 94 % | HEIGHT: 66 IN | HEART RATE: 64 BPM | BODY MASS INDEX: 30.53 KG/M2 | WEIGHT: 190 LBS

## 2017-07-05 DIAGNOSIS — N39.0 RECURRENT UTI: Primary | ICD-10-CM

## 2017-07-05 LAB
ALBUMIN UR-MCNC: NEGATIVE MG/DL
APPEARANCE UR: CLEAR
BILIRUB UR QL STRIP: NEGATIVE
COLOR UR AUTO: YELLOW
GLUCOSE UR STRIP-MCNC: NEGATIVE MG/DL
HGB UR QL STRIP: NEGATIVE
KETONES UR STRIP-MCNC: NEGATIVE MG/DL
LEUKOCYTE ESTERASE UR QL STRIP: ABNORMAL
NITRATE UR QL: NEGATIVE
PH UR STRIP: 7 PH (ref 5–7)
SP GR UR STRIP: 1.01 (ref 1–1.03)
URN SPEC COLLECT METH UR: ABNORMAL
UROBILINOGEN UR STRIP-ACNC: 0.2 EU/DL (ref 0.2–1)

## 2017-07-05 PROCEDURE — 81003 URINALYSIS AUTO W/O SCOPE: CPT | Performed by: UROLOGY

## 2017-07-05 PROCEDURE — 52000 CYSTOURETHROSCOPY: CPT | Performed by: UROLOGY

## 2017-07-05 PROCEDURE — 99212 OFFICE O/P EST SF 10 MIN: CPT | Mod: 25 | Performed by: UROLOGY

## 2017-07-05 RX ORDER — CIPROFLOXACIN 250 MG/1
250 TABLET, FILM COATED ORAL ONCE
Qty: 1 TABLET | Refills: 0 | Status: SHIPPED | OUTPATIENT
Start: 2017-07-05 | End: 2017-07-05

## 2017-07-05 ASSESSMENT — PAIN SCALES - GENERAL: PAINLEVEL: NO PAIN (0)

## 2017-07-05 NOTE — LETTER
7/5/2017      RE: Kierra Mcmullen  [address] [place] [address]       Kierra Mcmullen returns for cystoscopy. She has a normal renal ultrasound and KUB. She will be starting vaginal estrogen cream and suppositories that have been prescribed by Dr. Austin.  Flexible cystoscopy-normal urethra and bladder, normal ureteral orifices, no diverticulum, stone, tumor or fistula  Assessment: Recurrent bacterial cystitis due to colonization  Plan: Cipro 500 mg ×1. Vaginal estrogen cream. See me p.r.n.    Jacob Connell MD

## 2017-07-05 NOTE — MR AVS SNAPSHOT
"              After Visit Summary   7/5/2017    Kierra Mcmullen    MRN: [id number]           Patient Information     Date Of Birth          1945        Visit Information        Provider Department      7/5/2017 9:20 AM Jacob Connell MD; UB CYF [facility] [place]        Today's Diagnoses     Recurrent UTI    -  1      Care Instructions         AFTER YOUR CYSTOSCOPY         You have just completed a cystoscopy, or \"cysto\", which allowed your physician to learn more about your bladder (or to remove a stent placed after surgery). We suggest that you continue to avoid caffeine, fruit juice, and alcohol for the next 24 hours, however, you are encouraged to return to your normal activities.       A few things that are considered normal after your cystoscopy:    * small amount of bleeding (or spotting) that clears within the next 24 hours    * slight burning sensation with urination    * sensation to of needing to avoid more frequently    * the feeling of \"air\" in your urine    * mild discomfort that is relieved with Tylonol        Please contact our office promptly if you:    * develop a fever above 101 degrees    * are unable to urinate    * develop bright red blood that does not stop    * severe pain or swelling        And of course, please contact our office with any concerns or questions [phone]                Follow-ups after your visit        Who to contact     If you have questions or need follow up information about today's clinic visit or your schedule please contact [facility] [place] directly at [phone].  Normal or non-critical lab and imaging results will be communicated to you by MyChart, letter or phone within 4 business days after the clinic has received the results. If you do not hear from us within 7 days, please contact the clinic through MyChart or phone. If you have a critical or abnormal lab result, we will " "notify you by phone as soon as possible.  Submit refill requests through Orgger or call your pharmacy and they will forward the refill request to us. Please allow 3 business days for your refill to be completed.          Additional Information About Your Visit        Ekaya.[email or web address] Information     Orgger gives you secure access to your electronic health record. If you see a primary care provider, you can also send messages to your care team and make appointments. If you have questions, please call your primary care clinic.  If you do not have a primary care provider, please call [phone] and they will assist you.        Care EveryWhere ID     This is your Care EveryWhere ID. This could be used by other organizations to access your [place] medical records  [id number]        Your Vitals Were     Pulse Height Last Period Pulse Oximetry BMI (Body Mass Index)       64 1.676 m (5' 6\") 07/22/1997 94% 30.67 kg/m2        Blood Pressure from Last 3 Encounters:   06/27/17 120/76   06/19/17 122/82   05/23/17 118/80    Weight from Last 3 Encounters:   07/05/17 86.2 kg (190 lb)   06/27/17 87.7 kg (193 lb 6.4 oz)   06/19/17 87.5 kg (193 lb)              We Performed the Following     CYSTOURETHROSCOPY (31499)     UA without Microscopic          Today's Medication Changes          These changes are accurate as of: 7/5/17  9:50 AM.  If you have any questions, ask your nurse or doctor.               Start taking these medicines.        Dose/Directions    ciprofloxacin 250 MG tablet   Commonly known as:  CIPRO   Used for:  Recurrent UTI   Started by:  Jacob Connell MD        Dose:  250 mg   Take 1 tablet (250 mg) by mouth once for 1 dose   Quantity:  1 tablet   Refills:  0         These medicines have changed or have updated prescriptions.        Dose/Directions    calcium + D 600-200 MG-UNIT Tabs   This may have changed:  See the new instructions.   Generic drug:  calcium carbonate-vitamin D        1 TABLET DAILY   Quantity:  " 30   Refills:  0            Where to get your medicines      These medications were sent to [facility] [place], [place] - [facility] [address]  [address] [facility] [address]     Phone:  [phone]     ciprofloxacin 250 MG tablet                Primary Care Provider Office Phone # Fax #    Matt Whitney [phone]       [facility] [address] [facility] [address]        Equal Access to Services     SAGAR NIETO : Hadii aad ku hadasho Soomaali, waaxda luqadaha, qaybta kaalmada adeegyada, waxay idiin hayaan adeeg kharash lacheryl . So [facility] [phone].    ATENCIÓN: Si habla español, tiene a maxwell disposición servicios gratuitos de asistencia lingüística. [facility] [phone].    We comply with applicable federal civil rights laws and [place] laws. We do not discriminate on the basis of race, color, national origin, age, disability sex, sexual orientation or gender identity.            Thank you!     Thank you for choosing [facility] [place]  for your care. Our goal is always to provide you with excellent care. Hearing back from our patients is one way we can continue to improve our services. Please take a few minutes to complete the written survey that you may receive in the mail after your visit with us. Thank you!             Your Updated Medication List - Protect others around you: Learn how to safely use, store and throw away your medicines at www.disposemymeds.org.          This list is accurate as of: 7/5/17  9:50 AM.  Always use your most recent med list.                   Brand Name Dispense Instructions for use Diagnosis    Biotin 5000 MCG Caps           calcium + D 600-200 MG-UNIT Tabs   Generic drug:  calcium carbonate-vitamin D     30    1 TABLET DAILY        calcium polycarbophil 625 MG tablet    FIBERCON     Take 2 tablets by mouth daily        ciprofloxacin 250 MG tablet    CIPRO    1 tablet     Take 1 tablet (250 mg) by mouth once for 1 dose    Recurrent UTI       COMPOUND - PHARMACY TO MIX COMPOUNDED MEDICATION    CMPD RX    42 g    Estradiol 0.011% in HRT heavy (cream base)  Place in tube and include vaginal applicator Si gm intravaginally at HS on Mon and Thurs pm as directed    Recurrent UTI       GLUCOSAMINE CHONDROITIN COMPLX PO      Take 2 tablets by mouth daily. 750/600 .        levothyroxine 100 MCG tablet    SYNTHROID/LEVOTHROID    90 tablet    Take 1 tablet (100 mcg) by mouth daily    Routine general medical examination at a health care facility       MULTI-DAY Tabs      one PO QD

## 2017-07-05 NOTE — PATIENT INSTRUCTIONS
"     AFTER YOUR CYSTOSCOPY         You have just completed a cystoscopy, or \"cysto\", which allowed your physician to learn more about your bladder (or to remove a stent placed after surgery). We suggest that you continue to avoid caffeine, fruit juice, and alcohol for the next 24 hours, however, you are encouraged to return to your normal activities.       A few things that are considered normal after your cystoscopy:    * small amount of bleeding (or spotting) that clears within the next 24 hours    * slight burning sensation with urination    * sensation to of needing to avoid more frequently    * the feeling of \"air\" in your urine    * mild discomfort that is relieved with Tylonol        Please contact our office promptly if you:    * develop a fever above 101 degrees    * are unable to urinate    * develop bright red blood that does not stop    * severe pain or swelling        And of course, please contact our office with any concerns or questions [phone]        "

## 2017-07-05 NOTE — PROGRESS NOTES
Kierra Mcmullen returns for cystoscopy. She has a normal renal ultrasound and KUB. She will be starting vaginal estrogen cream and suppositories that have been prescribed by Dr. Austin.  Flexible cystoscopy-normal urethra and bladder, normal ureteral orifices, no diverticulum, stone, tumor or fistula  Assessment: Recurrent bacterial cystitis due to colonization  Plan: Cipro 500 mg ×1. Vaginal estrogen cream. See me p.r.n.

## 2017-07-05 NOTE — LETTER
7/5/2017       RE: Kierra Mcmullen  [address] [place] [address]     Dear Colleague,    Thank you for referring your patient, Kierra Mcmullen, to the [facility] [place] [facility]. Please see a copy of my visit note below.    Kierra Mcmullen returns for cystoscopy. She has a normal renal ultrasound and KUB. She will be starting vaginal estrogen cream and suppositories that have been prescribed by Dr. Austin.  Flexible cystoscopy-normal urethra and bladder, normal ureteral orifices, no diverticulum, stone, tumor or fistula  Assessment: Recurrent bacterial cystitis due to colonization  Plan: Cipro 500 mg ×1. Vaginal estrogen cream. See me p.r.n.    Again, thank you for allowing me to participate in the care of your patient.      Sincerely,    Jacob Connell MD

## 2017-07-05 NOTE — NURSING NOTE
Prior to the start of the procedure and with procedural staff participation, I verbally confirmed the patient s identity using two indicators, relevant allergies, that the procedure was appropriate and matched the consent or emergent situation, and that the correct equipment/implants were available. Immediately prior to starting the procedure I conducted the Time Out with the procedural staff and re-confirmed the patient s name, procedure, and site/side. (The Joint Commission universal protocol was followed.)  Yes    Sedation (Moderate or Deep): None  Pt has signed the consent form stating that we will be doing a CYSTOSCOPY (with or without stent removal) today, and that it is the correct procedure. I verbally confirmed the patient s identity using two indicators, relevant allergies, and that the correct equipment was available. Post-op information given to the pt as needed at check-out. I have sent an appropriate antibiotic to the pharmacy in our building as recommended by the MD. ALEX Urias CMA

## 2017-07-06 ENCOUNTER — TRANSFERRED RECORDS (OUTPATIENT)
Dept: HEALTH INFORMATION MANAGEMENT | Facility: CLINIC | Age: 72
End: 2017-07-06

## 2017-07-21 DIAGNOSIS — N39.0 RECURRENT UTI: ICD-10-CM

## 2017-07-21 LAB
ALBUMIN UR-MCNC: NEGATIVE MG/DL
APPEARANCE UR: CLEAR
BACTERIA #/AREA URNS HPF: ABNORMAL /HPF
BILIRUB UR QL STRIP: NEGATIVE
COLOR UR AUTO: YELLOW
GLUCOSE UR STRIP-MCNC: NEGATIVE MG/DL
HGB UR QL STRIP: ABNORMAL
KETONES UR STRIP-MCNC: NEGATIVE MG/DL
LEUKOCYTE ESTERASE UR QL STRIP: ABNORMAL
NITRATE UR QL: NEGATIVE
PH UR STRIP: 7 PH (ref 5–7)
RBC #/AREA URNS AUTO: ABNORMAL /HPF (ref 0–2)
SP GR UR STRIP: 1.01 (ref 1–1.03)
URN SPEC COLLECT METH UR: ABNORMAL
UROBILINOGEN UR STRIP-ACNC: 0.2 EU/DL (ref 0.2–1)
WBC #/AREA URNS AUTO: ABNORMAL /HPF (ref 0–2)

## 2017-07-21 PROCEDURE — 87086 URINE CULTURE/COLONY COUNT: CPT | Performed by: OBSTETRICS & GYNECOLOGY

## 2017-07-21 PROCEDURE — 87186 SC STD MICRODIL/AGAR DIL: CPT | Performed by: OBSTETRICS & GYNECOLOGY

## 2017-07-21 PROCEDURE — 81001 URINALYSIS AUTO W/SCOPE: CPT | Performed by: OBSTETRICS & GYNECOLOGY

## 2017-07-21 PROCEDURE — 87088 URINE BACTERIA CULTURE: CPT | Performed by: OBSTETRICS & GYNECOLOGY

## 2017-07-23 ENCOUNTER — TELEPHONE (OUTPATIENT)
Dept: OBGYN | Facility: CLINIC | Age: 72
End: 2017-07-23

## 2017-07-23 DIAGNOSIS — N30.00 ACUTE CYSTITIS WITHOUT HEMATURIA: Primary | ICD-10-CM

## 2017-07-23 RX ORDER — CIPROFLOXACIN 250 MG/1
250 TABLET, FILM COATED ORAL 2 TIMES DAILY
Qty: 14 TABLET | Refills: 0 | Status: SHIPPED | OUTPATIENT
Start: 2017-07-23 | End: 2017-07-30

## 2017-07-23 NOTE — TELEPHONE ENCOUNTER
I spoke with the pt this am and reviewed the UC results showing a UTI  I sent an Rx for Cipro 250 mg po bid for 7 days to the listed  pharmacy  I asked that she have a f/u UC in 2 weeks to document resolution as she has a hx of recurrent UTI in the past  orders placed  Xavier Austin M.D.

## 2017-08-07 DIAGNOSIS — N30.00 ACUTE CYSTITIS WITHOUT HEMATURIA: ICD-10-CM

## 2017-08-07 LAB
ALBUMIN UR-MCNC: NEGATIVE MG/DL
APPEARANCE UR: ABNORMAL
BILIRUB UR QL STRIP: NEGATIVE
COLOR UR AUTO: YELLOW
GLUCOSE UR STRIP-MCNC: NEGATIVE MG/DL
HGB UR QL STRIP: ABNORMAL
KETONES UR STRIP-MCNC: NEGATIVE MG/DL
LEUKOCYTE ESTERASE UR QL STRIP: ABNORMAL
NITRATE UR QL: NEGATIVE
PH UR STRIP: 5.5 PH (ref 5–7)
RBC #/AREA URNS AUTO: ABNORMAL /HPF (ref 0–2)
SP GR UR STRIP: <=1.005 (ref 1–1.03)
URN SPEC COLLECT METH UR: ABNORMAL
UROBILINOGEN UR STRIP-ACNC: 0.2 EU/DL (ref 0.2–1)
WBC #/AREA URNS AUTO: >100 /HPF (ref 0–2)

## 2017-08-07 PROCEDURE — 87088 URINE BACTERIA CULTURE: CPT | Performed by: OBSTETRICS & GYNECOLOGY

## 2017-08-07 PROCEDURE — 81001 URINALYSIS AUTO W/SCOPE: CPT | Performed by: OBSTETRICS & GYNECOLOGY

## 2017-08-07 PROCEDURE — 87186 SC STD MICRODIL/AGAR DIL: CPT | Performed by: OBSTETRICS & GYNECOLOGY

## 2017-08-07 PROCEDURE — 87086 URINE CULTURE/COLONY COUNT: CPT | Performed by: OBSTETRICS & GYNECOLOGY

## 2017-08-09 ENCOUNTER — TELEPHONE (OUTPATIENT)
Dept: OBGYN | Facility: CLINIC | Age: 72
End: 2017-08-09

## 2017-08-09 DIAGNOSIS — N39.0 RECURRENT UTI: ICD-10-CM

## 2017-08-09 DIAGNOSIS — N30.01 ACUTE CYSTITIS WITH HEMATURIA: Primary | ICD-10-CM

## 2017-08-09 RX ORDER — CIPROFLOXACIN 500 MG/1
500 TABLET, FILM COATED ORAL 2 TIMES DAILY
Qty: 14 TABLET | Refills: 0 | Status: SHIPPED | OUTPATIENT
Start: 2017-08-09 | End: 2017-11-15

## 2017-08-10 NOTE — TELEPHONE ENCOUNTER
I spoke with the pt regarding her UC results showing 50K col of Klebsiella sensitive to Cipro  I have sent an rx for Cipro 500 mg po bid for 7 days to the listed [facility]  She will have a f/u UC in 2-3 weeks and see me in early Sept for f/u  The pt is on vaginal estrogen  She is o/w assymptomatic  Xavier Austin M.D.

## 2017-08-24 DIAGNOSIS — N39.0 RECURRENT UTI: ICD-10-CM

## 2017-08-24 LAB
ALBUMIN UR-MCNC: NEGATIVE MG/DL
APPEARANCE UR: CLEAR
BILIRUB UR QL STRIP: NEGATIVE
COLOR UR AUTO: YELLOW
GLUCOSE UR STRIP-MCNC: NEGATIVE MG/DL
HGB UR QL STRIP: NEGATIVE
KETONES UR STRIP-MCNC: NEGATIVE MG/DL
LEUKOCYTE ESTERASE UR QL STRIP: NEGATIVE
NITRATE UR QL: NEGATIVE
PH UR STRIP: 5.5 PH (ref 5–7)
SOURCE: NORMAL
SP GR UR STRIP: <=1.005 (ref 1–1.03)
UROBILINOGEN UR STRIP-ACNC: 0.2 EU/DL (ref 0.2–1)

## 2017-08-24 PROCEDURE — 81003 URINALYSIS AUTO W/O SCOPE: CPT | Performed by: OBSTETRICS & GYNECOLOGY

## 2017-09-01 ENCOUNTER — MYC REFILL (OUTPATIENT)
Dept: INTERNAL MEDICINE | Facility: CLINIC | Age: 72
End: 2017-09-01

## 2017-09-01 DIAGNOSIS — Z00.00 ROUTINE GENERAL MEDICAL EXAMINATION AT A HEALTH CARE FACILITY: ICD-10-CM

## 2017-09-01 RX ORDER — LEVOTHYROXINE SODIUM 100 UG/1
100 TABLET ORAL DAILY
Qty: 90 TABLET | Refills: 3 | Status: CANCELLED | OUTPATIENT
Start: 2017-09-01

## 2017-09-05 ENCOUNTER — MYC MEDICAL ADVICE (OUTPATIENT)
Dept: INTERNAL MEDICINE | Facility: CLINIC | Age: 72
End: 2017-09-05

## 2017-09-05 DIAGNOSIS — Z00.00 ROUTINE GENERAL MEDICAL EXAMINATION AT A HEALTH CARE FACILITY: ICD-10-CM

## 2017-09-05 RX ORDER — LEVOTHYROXINE SODIUM 100 UG/1
100 TABLET ORAL DAILY
Qty: 90 TABLET | Refills: 2 | Status: SHIPPED | OUTPATIENT
Start: 2017-09-05 | End: 2018-04-13

## 2017-09-05 NOTE — TELEPHONE ENCOUNTER
Message from Majitekhart:  Original authorizing provider: MD Kierra Tanner would like a refill of the following medications:  levothyroxine (SYNTHROID/LEVOTHROID) 100 MCG tablet [Matt Whitney MD]    Preferred pharmacy: WRITTEN PRESCRIPTION REQUESTED    Comment:  Please mail hard copy of this prescription to my home address. I will submit to my mail order pharmacy myself. Thanks for your assistance.

## 2017-09-07 DIAGNOSIS — N39.0 RECURRENT UTI: ICD-10-CM

## 2017-09-07 DIAGNOSIS — R82.90 NONSPECIFIC FINDING ON EXAMINATION OF URINE: Primary | ICD-10-CM

## 2017-09-07 DIAGNOSIS — N30.01 ACUTE CYSTITIS WITH HEMATURIA: ICD-10-CM

## 2017-09-07 LAB

## 2017-09-07 PROCEDURE — 81001 URINALYSIS AUTO W/SCOPE: CPT | Performed by: OBSTETRICS & GYNECOLOGY

## 2017-09-07 PROCEDURE — 87086 URINE CULTURE/COLONY COUNT: CPT | Performed by: OBSTETRICS & GYNECOLOGY

## 2017-09-08 LAB
BACTERIA SPEC CULT: NO GROWTH
SPECIMEN SOURCE: NORMAL

## 2017-09-12 ENCOUNTER — OFFICE VISIT (OUTPATIENT)
Dept: OBGYN | Facility: CLINIC | Age: 72
End: 2017-09-12
Payer: COMMERCIAL

## 2017-09-12 ENCOUNTER — TRANSFERRED RECORDS (OUTPATIENT)
Dept: HEALTH INFORMATION MANAGEMENT | Facility: CLINIC | Age: 72
End: 2017-09-12

## 2017-09-12 VITALS — BODY MASS INDEX: 30.99 KG/M2 | SYSTOLIC BLOOD PRESSURE: 118 MMHG | WEIGHT: 192 LBS | DIASTOLIC BLOOD PRESSURE: 70 MMHG

## 2017-09-12 DIAGNOSIS — Z23 NEED FOR PROPHYLACTIC VACCINATION AND INOCULATION AGAINST INFLUENZA: Primary | ICD-10-CM

## 2017-09-12 DIAGNOSIS — N39.0 RECURRENT UTI: ICD-10-CM

## 2017-09-12 PROCEDURE — 90471 IMMUNIZATION ADMIN: CPT | Performed by: OBSTETRICS & GYNECOLOGY

## 2017-09-12 PROCEDURE — 99214 OFFICE O/P EST MOD 30 MIN: CPT | Mod: 25 | Performed by: OBSTETRICS & GYNECOLOGY

## 2017-09-12 PROCEDURE — 90662 IIV NO PRSV INCREASED AG IM: CPT | Performed by: OBSTETRICS & GYNECOLOGY

## 2017-09-12 NOTE — PATIENT INSTRUCTIONS
You can reach your [facility] Care Team any time of the day by calling [phone]. This number will put you in touch with the 24 hour nurse line if the clinic is closed.    To contact your OB/GYN Station Coordinator/Surgery Scheduler please call [phone]. This is a direct number for your care team between 8 a.m. and 4 p.m. Monday through Friday.    [facility] is open for your convenience:  Monday through Friday 8 a.m. to 6 p.m.  Closed weekends and all major holidays.

## 2017-09-12 NOTE — MR AVS SNAPSHOT
After Visit Summary   9/12/2017    Kierra Mcmullen    MRN: [id number]           Patient Information     Date Of Birth          1945        Visit Information        Provider Department      9/12/2017 2:15 PM Xavier Austin MD [facility]        Today's Diagnoses     Need for prophylactic vaccination and inoculation against influenza    -  1    Recurrent UTI          Care Instructions    You can reach your [facility] Care Team any time of the day by calling [phone]. This number will put you in touch with the 24 hour nurse line if the clinic is closed.    To contact your OB/GYN Station Coordinator/Surgery Scheduler please call [phone]. This is a direct number for your care team between 8 a.m. and 4 p.m. Monday through Friday.    [facility] is open for your convenience:  Monday through Friday 8 a.m. to 6 p.m.  Closed weekends and all major holidays.          Follow-ups after your visit        Your next 10 appointments already scheduled     Sep 26, 2017  9:30 AM CDT   Return Visit with Jacob Connell MD   [facility] [place] ([facility] [place])    [facility] [address]  [address]  [facility] [id number] [phone]              Future tests that were ordered for you today     Open Future Orders        Priority Expected Expires Ordered    *UA reflex to Microscopic and Culture ([place] and [facility] (except [place] and [place]) Routine 11/1/2017 9/12/2018 9/12/2017    *UA reflex to Microscopic and Culture ([facility] and [facility] (except [place] and [place]) Routine 12/1/2017 9/12/2018 9/12/2017    *UA reflex to Microscopic and Culture ([place] and [facility] (except [place] and [place]) Routine 10/2/2017 9/12/2018 9/12/2017            Who to contact     If you have questions or need follow up information about today's clinic visit or your schedule please contact [facility]  directly at [phone].  Normal or non-critical lab and imaging results will be communicated to you by MyChart, letter or phone within 4 business days after the clinic has received the results. If you do not hear from us within 7 days, please contact the clinic through Ecochlorhart or phone. If you have a critical or abnormal lab result, we will notify you by phone as soon as possible.  Submit refill requests through Solarmass or call your pharmacy and they will forward the refill request to us. Please allow 3 business days for your refill to be completed.          Additional Information About Your Visit        Ecochlorhart Information     Solarmass gives you secure access to your electronic health record. If you see a primary care provider, you can also send messages to your care team and make appointments. If you have questions, please call your primary care clinic.  If you do not have a primary care provider, please call [phone] and they will assist you.        Care EveryWhere ID     This is your Care EveryWhere ID. This could be used by other organizations to access your [place] medical records  [id number]        Your Vitals Were     Last Period BMI (Body Mass Index)                07/22/1997 30.99 kg/m2           Blood Pressure from Last 3 Encounters:   09/12/17 118/70   06/27/17 120/76   06/19/17 122/82    Weight from Last 3 Encounters:   09/12/17 192 lb (87.1 kg)   07/05/17 190 lb (86.2 kg)   06/27/17 193 lb 6.4 oz (87.7 kg)              We Performed the Following     FLU VACCINE, INCREASED ANTIGEN, PRESV FREE, AGE 65+ [29932]          Today's Medication Changes          These changes are accurate as of: 9/12/17 11:59 PM.  If you have any questions, ask your nurse or doctor.               These medicines have changed or have updated prescriptions.        Dose/Directions    calcium + D 600-200 MG-UNIT Tabs   This may have changed:  See the new instructions.   Generic drug:  calcium carbonate-vitamin D        1  TABLET DAILY   Quantity:  30   Refills:  0                Primary Care Provider Office Phone # Fax #    Matt Whitney [phone]       [address] [facility] [address]        Equal Access to Services     SAGAR NIETO : Hadlouis shahana ku vikasho Soomaali, waaxda luqadaha, qaybta kaalmada adebiayada, criss moren alexandre michellerusty suggs. So [facility] [phone].    ATENCIÓN: Si habla español, tiene a maxwell disposición servicios gratuitos de asistencia lingüística. Llame al [phone].    We comply with applicable federal civil rights laws and [place] laws. We do not discriminate on the basis of race, color, national origin, age, disability sex, sexual orientation or gender identity.            Thank you!     Thank you for choosing [facility]  for your care. Our goal is always to provide you with excellent care. Hearing back from our patients is one way we can continue to improve our services. Please take a few minutes to complete the written survey that you may receive in the mail after your visit with us. Thank you!             Your Updated Medication List - Protect others around you: Learn how to safely use, store and throw away your medicines at www.disposemymeds.org.          This list is accurate as of: 9/12/17 11:59 PM.  Always use your most recent med list.                   Brand Name Dispense Instructions for use Diagnosis    Biotin 5000 MCG Caps           calcium + D 600-200 MG-UNIT Tabs   Generic drug:  calcium carbonate-vitamin D     30    1 TABLET DAILY        calcium polycarbophil 625 MG tablet    FIBERCON     Take 2 tablets by mouth daily        ciprofloxacin 500 MG tablet    CIPRO    14 tablet    Take 1 tablet (500 mg) by mouth 2 times daily    Acute cystitis with hematuria       COMPOUNDED NON-CONTROLLED SUBSTANCE - PHARMACY TO MIX COMPOUNDED MEDICATION    CMPD RX    42 g    Estradiol 0.011% in HRT heavy (cream base)  Place in tube and include vaginal  applicator Si gm intravaginally at HS on Mon and Thurs pm as directed    Recurrent UTI       GLUCOSAMINE CHONDROITIN COMPLX PO      Take 2 tablets by mouth daily. 750/600 .        levothyroxine 100 MCG tablet    SYNTHROID/LEVOTHROID    90 tablet    Take 1 tablet (100 mcg) by mouth daily    Routine general medical examination at a health care facility       MULTI-DAY Tabs      one PO QD

## 2017-09-12 NOTE — PROGRESS NOTES
Injectable Influenza Immunization Documentation    1.  Are you sick today? (Fever of 100.5 or higher on the day of the clinic)   No    2.  Have you ever had Guillain-[place] Syndrome within 6 weeks of an influenza vaccionation?  No    3. Do you have a life-threatening allergy to eggs?  No    4. Do you have a life-threatening allergy to a component of the vaccine? May include antibiotics, gelatin or latex.  No     5. Have you ever had a reaction to a dose of flu vaccine that needed immediate medical attention?  No     Form completed by Yolande Lynn [facility]     Kierra Mcmullen is a 72 year old white  female ,, vas and postmenopausal not on hormone replacement therapy who is status post da Erica robotic LSH/BSO, sacral colpopexy 12 who presents for evaluation of recurrent UTI;s   See the OV notes from 17 for details  She also sees Dr Connell for this concern and recently had a normal cysto.  At present she is assymptomatic and empties her bladder well.   We discussed the vaginal estrogen cream and the logic for this  She states that often she has a UTI and is unaware  We discussed doing 3 monthly UMAC with culture prn to f/u  The pt does have a reoccurrence of her midline cystocele with o/w excellent pelvic floor support  Presently she is not bothered by it and does not want any further rx  Past Medical History:   Diagnosis Date     Adenomatous polyp of colon 2011    needs conolonospy 2016     Hypothyroidism     Abstracted 02     Osteopenia      Family History   Problem Relation Age of Onset     Alzheimer Disease Father      CANCER Mother       non-Hodgkins lymphoma     OSTEOPOROSIS Mother      Arthritis Mother      RA     GASTROINTESTINAL DISEASE Mother      UC     OSTEOPOROSIS Sister      GASTROINTESTINAL DISEASE Sister      diverticulitis     Cardiovascular Maternal Aunt      MI     Breast Cancer Paternal Aunt      Social History     Social History     Marital status:       Spouse name: Sage     Number of children: 1     Years of education: 18     Occupational History     [employer]     Social History Main Topics     Smoking status: Former Smoker     Packs/day: 1.00     Years: 25.00     Types: Cigarettes     Quit date: 1/1/1987     Smokeless tobacco: Never Used      Comment: 1985     Alcohol use 6.0 oz/week      Comment: 2-3 glasses of wine per wk     Drug use: No     Sexual activity: Yes     Partners: Male     Birth control/ protection: Surgical      Comment: vas     Other Topics Concern     Caffeine Concern No     Occupational Exposure No     Hobby Hazards No     Sleep Concern No     Stress Concern No     Weight Concern No     Special Diet No     Back Care No     Exercise Yes     walk     Seat Belt Yes     Social History Narrative      ROS: 10 point ROS neg other than the symptoms noted above in the HPI.  Constitutional: healthy, alert and no distress  Cardiovascular: negative, PMI normal. No lifts, heaves, or thrills. RRR. No murmurs, clicks gallops or rub  Respiratory: negative, Percussion normal. Good diaphragmatic excursion. Lungs clear  Gastrointestinal: Abdomen soft, non-tender. BS normal. No masses, organomegaly  Genitourinary: Normal external genitalia without lesions and  Gr 2 midline cytocele  No leakage with cough or strain spec: no lesons excellent apical support  BM cx stump well supported no masses RV no masses min rectocele    (Z23) Need for prophylactic vaccination and inoculation against influenza  (primary encounter diagnosis)  Comment: recs discussed   Plan: FLU VACCINE, INCREASED ANTIGEN, PRESV FREE, AGE        65+ [21168]        done    (N39.0) Recurrent UTI  Comment: will follow above plan with appropriate rx to follow  Plan: *UA reflex to Microscopic and Culture ([facility] (except [place] and         [place]), *UA reflex to Microscopic and Culture        ([facility] (except [place]         and [place]), *UA reflex to Microscopic and         Culture ([place] and [facility] (except         [place] and [place])        F/u 12/17 prior to pt leaving for [place] for the winter

## 2017-09-12 NOTE — NURSING NOTE
"Chief Complaint   Patient presents with     Urinary Problem     Flu Shot       Initial /70  Wt 192 lb (87.1 kg)  LMP 1997  BMI 30.99 kg/m2 Estimated body mass index is 30.99 kg/(m^2) as calculated from the following:    Height as of 17: 5' 6\" (1.676 m).    Weight as of this encounter: 192 lb (87.1 kg).  BP completed using cuff size: regular        The following HM Due: NONE      The following patient reported/Care Every where data was sent to:  P ABSTRACT QUALITY INITIATIVES [10971]       Yolande Lynn, [facility]                "

## 2017-10-16 DIAGNOSIS — N39.0 RECURRENT UTI: ICD-10-CM

## 2017-10-16 DIAGNOSIS — R82.90 NONSPECIFIC FINDING ON EXAMINATION OF URINE: Primary | ICD-10-CM

## 2017-10-16 LAB
ALBUMIN UR-MCNC: NEGATIVE MG/DL
APPEARANCE UR: ABNORMAL
BACTERIA #/AREA URNS HPF: ABNORMAL /HPF
BILIRUB UR QL STRIP: NEGATIVE
COLOR UR AUTO: YELLOW
GLUCOSE UR STRIP-MCNC: NEGATIVE MG/DL
HGB UR QL STRIP: NEGATIVE
KETONES UR STRIP-MCNC: NEGATIVE MG/DL
LEUKOCYTE ESTERASE UR QL STRIP: ABNORMAL
NITRATE UR QL: POSITIVE
NON-SQ EPI CELLS #/AREA URNS LPF: ABNORMAL /LPF
PH UR STRIP: 7 PH (ref 5–7)
RBC #/AREA URNS AUTO: ABNORMAL /HPF
SOURCE: ABNORMAL
SP GR UR STRIP: 1.01 (ref 1–1.03)
UROBILINOGEN UR STRIP-ACNC: 0.2 EU/DL (ref 0.2–1)
WBC #/AREA URNS AUTO: ABNORMAL /HPF

## 2017-10-16 PROCEDURE — 87088 URINE BACTERIA CULTURE: CPT | Performed by: OBSTETRICS & GYNECOLOGY

## 2017-10-16 PROCEDURE — 87186 SC STD MICRODIL/AGAR DIL: CPT | Performed by: OBSTETRICS & GYNECOLOGY

## 2017-10-16 PROCEDURE — 87086 URINE CULTURE/COLONY COUNT: CPT | Performed by: OBSTETRICS & GYNECOLOGY

## 2017-10-16 PROCEDURE — 81001 URINALYSIS AUTO W/SCOPE: CPT | Performed by: OBSTETRICS & GYNECOLOGY

## 2017-10-17 NOTE — PROGRESS NOTES
I spoke with the patient about this.  She is presently assymptomatic and would like to wait until the UC has returned before starting any antibiotic Rx  Sx of concern discussed and the pt will call  We will notify her of the UC results when they are available  Xavier [facility] M.D.

## 2017-10-18 LAB
BACTERIA SPEC CULT: ABNORMAL
SPECIMEN SOURCE: ABNORMAL

## 2017-10-19 ENCOUNTER — TELEPHONE (OUTPATIENT)
Dept: OBGYN | Facility: CLINIC | Age: 72
End: 2017-10-19

## 2017-10-19 DIAGNOSIS — N39.0 RECURRENT UTI: Primary | ICD-10-CM

## 2017-10-19 RX ORDER — CIPROFLOXACIN 250 MG/1
250 TABLET, FILM COATED ORAL 2 TIMES DAILY
Qty: 14 TABLET | Refills: 0 | Status: SHIPPED | OUTPATIENT
Start: 2017-10-19 | End: 2017-10-26

## 2017-10-19 NOTE — TELEPHONE ENCOUNTER
I reviewed the results of her positive urine culture showing an Escherichia coli UTI with the patient today. I sent a prescription for Cipro 250 mg by mouth twice a day for 7 days to the [facility] pharmacy. I've asked that she have a follow-up urine analysis and culture 7-10 days after finishing the antibiotics. Like to see her in the office after that appointment to review the results and develop an appropriate therapy plan for her recurrent UTIs

## 2017-11-03 DIAGNOSIS — N39.0 RECURRENT UTI: ICD-10-CM

## 2017-11-03 LAB
ALBUMIN UR-MCNC: NEGATIVE MG/DL
APPEARANCE UR: CLEAR
BILIRUB UR QL STRIP: NEGATIVE
COLOR UR AUTO: YELLOW
GLUCOSE UR STRIP-MCNC: NEGATIVE MG/DL
HGB UR QL STRIP: NEGATIVE
KETONES UR STRIP-MCNC: NEGATIVE MG/DL
LEUKOCYTE ESTERASE UR QL STRIP: ABNORMAL
NITRATE UR QL: NEGATIVE
PH UR STRIP: 7 PH (ref 5–7)
RBC #/AREA URNS AUTO: NORMAL /HPF
SOURCE: ABNORMAL
SP GR UR STRIP: 1.01 (ref 1–1.03)
UROBILINOGEN UR STRIP-ACNC: 0.2 EU/DL (ref 0.2–1)
WBC #/AREA URNS AUTO: NORMAL /HPF

## 2017-11-03 PROCEDURE — 81001 URINALYSIS AUTO W/SCOPE: CPT | Performed by: OBSTETRICS & GYNECOLOGY

## 2017-11-03 PROCEDURE — 87086 URINE CULTURE/COLONY COUNT: CPT | Performed by: OBSTETRICS & GYNECOLOGY

## 2017-11-04 LAB
BACTERIA SPEC CULT: NORMAL
SPECIMEN SOURCE: NORMAL

## 2017-11-06 ENCOUNTER — TELEPHONE (OUTPATIENT)
Dept: OBGYN | Facility: CLINIC | Age: 72
End: 2017-11-06

## 2017-11-06 ENCOUNTER — OFFICE VISIT (OUTPATIENT)
Dept: OBGYN | Facility: CLINIC | Age: 72
End: 2017-11-06
Payer: COMMERCIAL

## 2017-11-06 VITALS — DIASTOLIC BLOOD PRESSURE: 70 MMHG | WEIGHT: 190 LBS | SYSTOLIC BLOOD PRESSURE: 122 MMHG | BODY MASS INDEX: 30.67 KG/M2

## 2017-11-06 DIAGNOSIS — N39.0 RECURRENT UTI: Primary | ICD-10-CM

## 2017-11-06 PROCEDURE — 99213 OFFICE O/P EST LOW 20 MIN: CPT | Performed by: OBSTETRICS & GYNECOLOGY

## 2017-11-06 NOTE — PATIENT INSTRUCTIONS
You can reach your [facility] Care Team any time of the day by calling [phone]. This number will put you in touch with the 24 hour nurse line if the clinic is closed.    To contact your OB/GYN Station Coordinator/Surgery Scheduler please call [phone]. This is a direct number for your care team between 8 a.m. and 4 p.m. Monday through Friday.    [facility] is open for your convenience:  Monday through Friday 8 a.m. to 6 p.m.  Closed weekends and all major holidays.

## 2017-11-06 NOTE — MR AVS SNAPSHOT
After Visit Summary   11/6/2017    Kierra Mcmullen    MRN: [id number]           Patient Information     Date Of Birth          1945        Visit Information        Provider Department      11/6/2017 9:45 AM Xavier Austin MD [facility]        Today's Diagnoses     Recurrent UTI    -  1      Care Instructions    You can reach your [facility] Care Team any time of the day by calling [phone]. This number will put you in touch with the 24 hour nurse line if the clinic is closed.    To contact your OB/GYN Station Coordinator/Surgery Scheduler please call [phone]. This is a direct number for your care team between 8 a.m. and 4 p.m. Monday through Friday.    [facility] is open for your convenience:  Monday through Friday 8 a.m. to 6 p.m.  Closed weekends and all major holidays.            Follow-ups after your visit        Who to contact     If you have questions or need follow up information about today's clinic visit or your schedule please contact [facility] directly at [phone].  Normal or non-critical lab and imaging results will be communicated to you by [employer] Per[employer], letter or phone within 4 business days after the clinic has received the results. If you do not hear from us within 7 days, please contact the clinic through [employer] or phone. If you have a critical or abnormal lab result, we will notify you by phone as soon as possible.  Submit refill requests through [employer] or call your pharmacy and they will forward the refill request to us. Please allow 3 business days for your refill to be completed.          Additional Information About Your Visit        MyChart Information     [employer] gives you secure access to your electronic health record. If you see a primary care provider, you can also send messages to your care team and make appointments. If you have questions, please call your primary care clinic.  If you do not have a primary care  provider, please call [phone] and they will assist you.        Care EveryWhere ID     This is your Care EveryWhere ID. This could be used by other organizations to access your [facility] medical records  [id number]        Your Vitals Were     Last Period BMI (Body Mass Index)                07/22/1997 30.67 kg/m2           Blood Pressure from Last 3 Encounters:   11/06/17 122/70   09/12/17 118/70   06/27/17 120/76    Weight from Last 3 Encounters:   11/06/17 190 lb (86.2 kg)   09/12/17 192 lb (87.1 kg)   07/05/17 190 lb (86.2 kg)              Today, you had the following     No orders found for display         Today's Medication Changes          These changes are accurate as of: 11/6/17 11:59 PM.  If you have any questions, ask your nurse or doctor.               Start taking these medicines.        Dose/Directions    nitroFURantoin (macrocrystal-monohydrate) 100 MG capsule   Commonly known as:  MACROBID   Used for:  Recurrent UTI   Started by:  Xavier Austin MD        Dose:  100 mg   Take 1 capsule (100 mg) by mouth 2 times daily   Quantity:  14 capsule   Refills:  1         These medicines have changed or have updated prescriptions.        Dose/Directions    calcium + D 600-200 MG-UNIT Tabs   This may have changed:  See the new instructions.   Generic drug:  calcium carbonate-vitamin D        1 TABLET DAILY   Quantity:  30   Refills:  0            Where to get your medicines      These medications were sent to [facility] [address] [address] [address] [address] [place] [address] & [facility]  [address] [facility] [address]     Phone:  [phone]     nitroFURantoin (macrocrystal-monohydrate) 100 MG capsule                Primary Care Provider Office Phone # Fax #    Matt Whitney [phone]       [address]  [facility] [address]        Equal Access to Services     SAGAR NIETO AH: Nickolas David, renu العلي, estrada winter  criss cabia rodriguezaan ah. So [facility] [phone].    ATENCIÓN: Si miya ferrari, tiene a maxwell disposición servicios gratuitos de asistencia lingüística. Toya al [phone].    We comply with applicable federal civil rights laws and [place] laws. We do not discriminate on the basis of race, color, national origin, age, disability, sex, sexual orientation, or gender identity.            Thank you!     Thank you for choosing [facility]  for your care. Our goal is always to provide you with excellent care. Hearing back from our patients is one way we can continue to improve our services. Please take a few minutes to complete the written survey that you may receive in the mail after your visit with us. Thank you!             Your Updated Medication List - Protect others around you: Learn how to safely use, store and throw away your medicines at www.disposemymeds.org.          This list is accurate as of: 17 11:59 PM.  Always use your most recent med list.                   Brand Name Dispense Instructions for use Diagnosis    Biotin 5000 MCG Caps           calcium + D 600-200 MG-UNIT Tabs   Generic drug:  calcium carbonate-vitamin D     30    1 TABLET DAILY        calcium polycarbophil 625 MG tablet    FIBERCON     Take 2 tablets by mouth daily        ciprofloxacin 500 MG tablet    CIPRO    14 tablet    Take 1 tablet (500 mg) by mouth 2 times daily    Acute cystitis with hematuria       COMPOUNDED NON-CONTROLLED SUBSTANCE - PHARMACY TO MIX COMPOUNDED MEDICATION    CMPD RX    42 g    Estradiol 0.011% in HRT heavy (cream base)  Place in tube and include vaginal applicator Si gm intravaginally at HS on Mon and Thurs pm as directed    Recurrent UTI       GLUCOSAMINE CHONDROITIN COMPLX PO      Take 2 tablets by mouth daily. 750/600 .        levothyroxine 100 MCG tablet    SYNTHROID/LEVOTHROID    90 tablet    Take 1 tablet (100 mcg) by mouth daily    Routine general  medical examination at a health care facility       MULTI-DAY Tabs      one PO QD        nitroFURantoin (macrocrystal-monohydrate) 100 MG capsule    MACROBID    14 capsule    Take 1 capsule (100 mg) by mouth 2 times daily    Recurrent UTI

## 2017-11-06 NOTE — NURSING NOTE
"Chief Complaint   Patient presents with     Follow Up For     UTI       Initial /70  Wt 190 lb (86.2 kg)  LMP 1997  BMI 30.67 kg/m2 Estimated body mass index is 30.67 kg/(m^2) as calculated from the following:    Height as of 17: 5' 6\" (1.676 m).    Weight as of this encounter: 190 lb (86.2 kg).  BP completed using cuff size: regular        The following HM Due: NONE      The following patient reported/Care Every where data was sent to:  P ABSTRACT QUALITY INITIATIVES [[id number]]       Yolande Lynn, [facility]                "

## 2017-11-06 NOTE — TELEPHONE ENCOUNTER
Patient was seen in clinic this morning. Dr. Autsin had mentioned writing a prescription for her to take to [place], but patient did not receive it before she left. Please print out and mail to pt's home address.    RAFAELA MATTSON RN

## 2017-11-07 RX ORDER — NITROFURANTOIN 25; 75 MG/1; MG/1
100 CAPSULE ORAL 2 TIMES DAILY
Qty: 14 CAPSULE | Refills: 1 | Status: SHIPPED | OUTPATIENT
Start: 2017-11-07 | End: 2018-04-13

## 2017-11-07 NOTE — PROGRESS NOTES
Kierra Mcmullen is a 72 year old female  postmenopausal using vaginal estradiol cream every Monday and Thursday night for a history of recurrent UTIs presents today for a follow-up. The patient had a recent urinalysis and culture which showed no pyuria and the culture was negative. She presently is asymptomatic. The patient is planning to go to [place]  From December through April. We discussed doing a monthly urinalysis and urine culture as oftentimes she'll have an infection not realizing it is present.We had a lengthy discussion today regarding the pathophysiology and etiology of recurring UTIs and her workups that's been done to date. I gave her a detailed written outline. The patient does have an physician that she has seen in the past in [place]. The patient states that she is able to adequately empty her bladder and has no other pelvic floor symptoms or complaints at this time.      Past Medical History:   Diagnosis Date     Adenomatous polyp of colon 2011    needs conolonospy 2016     Hypothyroidism     Abstracted 02     Osteopenia      Past Surgical History:   Procedure Laterality Date     ARTHROPLASTY KNEE Left 2015    Procedure: ARTHROPLASTY KNEE;  Surgeon: Daniel Mack MD;  Location:  OR      NONSPECIFIC PROCEDURE      Cholecystectomy -- Abstracted 02     CHOLECYSTECTOMY       COLONOSCOPY N/A 2016    Procedure: COLONOSCOPY;  Surgeon: Sage Berg MD;  Location:  GI     CYSTOSCOPY  2012    Procedure:CYSTOSCOPY; Surgeon:GAMA GRIDER; Location: OR     DAVINCI HYSTERECTOMY SUPRACERVICAL, SACROCOLPOPEXY, COMBINED  2012    Procedure:COMBINED DAVINCI HYSTERECTOMY SUPRACERVICAL, SACROCOLPOPEXY; DAVINCI LAPAROSCOPIC SUPRACERVICAL HYSTERECTOMY, BILATERAL SALPINGO-OOPHORECTOMY, SACROCOLPOPEXY  WITH COLOPLASTY MESH AND CYSTOPLASTY; Surgeon:GAMA GRIDER; Location: OR      COLONOSCOPY THRU STOMA, DIAGNOSTIC        REMOVAL OF TONSILS,<12  "Y/O      Tonsils <12y.o.     HC TOOTH EXTRACTION W/FORCEP        ROS: 10 point ROS neg other than the symptoms noted above in the HPI.  Vital signs:      BP: 122/70               Weight: 190 lb (86.2 kg)  Estimated body mass index is 30.67 kg/(m^2) as calculated from the following:    Height as of 7/5/17: 5' 6\" (1.676 m).    Weight as of this encounter: 190 lb (86.2 kg).        Constitutional: healthy, alert and no distress    (N39.0) Recurrent UTI  (primary encounter diagnosis)  Comment: hygiene issues discussed. We discussed cleansing with a mild soap such as Balneol after voiding to minimize bowel contamination as well as postcoitally. We discussed the use of cranberry tablets. The patient does take a probiotic that her daughter had recommended. We discussed the importance of adequate emptying of her bladder  Plan: I did give her prescription from [facility] that she can complete when she is in [place]. The patient is requesting a prescription for an antibiotic and I will send her prescription for Macrobid 100 mg by mouth twice a day for 7 days when necessary UTI. I asked that she has a laboratory confirmed UTI with sensitivities prior to starting the medication. A detailed written plan was given  15 min spent w > 50% in counseling            "

## 2017-11-15 ENCOUNTER — TELEPHONE (OUTPATIENT)
Dept: OBGYN | Facility: CLINIC | Age: 72
End: 2017-11-15

## 2017-11-20 ENCOUNTER — TRANSFERRED RECORDS (OUTPATIENT)
Dept: HEALTH INFORMATION MANAGEMENT | Facility: CLINIC | Age: 72
End: 2017-11-20

## 2017-11-20 DIAGNOSIS — N39.0 RECURRENT UTI: ICD-10-CM

## 2017-11-20 LAB
ALBUMIN UR-MCNC: NEGATIVE MG/DL
APPEARANCE UR: CLEAR
BACTERIA #/AREA URNS HPF: ABNORMAL /HPF
BILIRUB UR QL STRIP: NEGATIVE
COLOR UR AUTO: YELLOW
GLUCOSE UR STRIP-MCNC: NEGATIVE MG/DL
HGB UR QL STRIP: NEGATIVE
KETONES UR STRIP-MCNC: NEGATIVE MG/DL
LEUKOCYTE ESTERASE UR QL STRIP: ABNORMAL
NITRATE UR QL: NEGATIVE
NON-SQ EPI CELLS #/AREA URNS LPF: ABNORMAL /LPF
PH UR STRIP: 7 PH (ref 5–7)
RBC #/AREA URNS AUTO: ABNORMAL /HPF
SOURCE: ABNORMAL
SP GR UR STRIP: 1.01 (ref 1–1.03)
UROBILINOGEN UR STRIP-ACNC: 0.2 EU/DL (ref 0.2–1)
WBC #/AREA URNS AUTO: ABNORMAL /HPF

## 2017-11-20 PROCEDURE — 81001 URINALYSIS AUTO W/SCOPE: CPT | Performed by: OBSTETRICS & GYNECOLOGY

## 2017-11-30 ENCOUNTER — TELEPHONE (OUTPATIENT)
Dept: OBGYN | Facility: CLINIC | Age: 72
End: 2017-11-30

## 2017-11-30 ENCOUNTER — NURSE TRIAGE (OUTPATIENT)
Dept: NURSING | Facility: CLINIC | Age: 72
End: 2017-11-30

## 2017-11-30 DIAGNOSIS — N39.0 RECURRENT UTI: ICD-10-CM

## 2017-11-30 LAB
ALBUMIN UR-MCNC: NEGATIVE MG/DL
APPEARANCE UR: CLEAR
BACTERIA #/AREA URNS HPF: ABNORMAL /HPF
BILIRUB UR QL STRIP: NEGATIVE
COLOR UR AUTO: YELLOW
GLUCOSE UR STRIP-MCNC: NEGATIVE MG/DL
HGB UR QL STRIP: ABNORMAL
KETONES UR STRIP-MCNC: NEGATIVE MG/DL
LEUKOCYTE ESTERASE UR QL STRIP: ABNORMAL
NITRATE UR QL: NEGATIVE
PH UR STRIP: 6 PH (ref 5–7)
RBC #/AREA URNS AUTO: ABNORMAL /HPF
SOURCE: ABNORMAL
SP GR UR STRIP: <=1.005 (ref 1–1.03)
UROBILINOGEN UR STRIP-ACNC: 0.2 EU/DL (ref 0.2–1)
WBC #/AREA URNS AUTO: ABNORMAL /HPF

## 2017-11-30 PROCEDURE — 87088 URINE BACTERIA CULTURE: CPT | Performed by: OBSTETRICS & GYNECOLOGY

## 2017-11-30 PROCEDURE — 87086 URINE CULTURE/COLONY COUNT: CPT | Performed by: OBSTETRICS & GYNECOLOGY

## 2017-11-30 PROCEDURE — 87186 SC STD MICRODIL/AGAR DIL: CPT | Performed by: OBSTETRICS & GYNECOLOGY

## 2017-11-30 PROCEDURE — 81001 URINALYSIS AUTO W/SCOPE: CPT | Performed by: OBSTETRICS & GYNECOLOGY

## 2017-11-30 NOTE — TELEPHONE ENCOUNTER
"Clinic Action Needed:Yes, Please call patient [phone]  Reason for Call:Patient calling requesting lab only visit for recurring UTI symptoms.  Symptoms starting Monday 11/27/17 with \"strong smell to urine.\" Denies other symptoms \"I feel fine.\"  Stating she is concerned with recurring UTI history. Requesting to come into clinic today at 10 a.m. To lab.  Routed to:Dr Austin Nurse Pool    Yu Kaur RN  [facility]    "

## 2017-11-30 NOTE — TELEPHONE ENCOUNTER
Reason for Disposition    Bad or foul-smelling urine    Additional Information    Negative: Shock suspected (e.g., cold/pale/clammy skin, too weak to stand, low BP, rapid pulse)    Negative: Sounds like a life-threatening emergency to the triager    Negative: Followed a genital area injury    Negative: Followed a genital area injury (penis, scrotum)    Negative: Vaginal discharge    Negative: Pus (white, yellow) or bloody discharge from end of penis    Negative: [1] Taking antibiotic for urinary tract infection (UTI) AND [2] female    Negative: [1] Taking antibiotic for urinary tract infection (UTI) AND [2] male    Negative: [1] Discomfort (pain, burning or stinging) when passing urine AND [2] pregnant    Negative: [1] Discomfort (pain, burning or stinging) when passing urine AND [2] postpartum < 1 month    Negative: [1] Discomfort (pain, burning or stinging) when passing urine AND [2] female    Negative: [1] Discomfort (pain, burning or stinging) when passing urine AND [2] male    Negative: Pain or itching in the vulvar area    Negative: Pain in scrotum is main symptom    Negative: Blood in the urine is main symptom    Negative: Symptoms arising from use of a urinary catheter (Mcneil or Coude)    Negative: [1] Unable to urinate (or only a few drops) > 4 hours AND     [2] bladder feels very full (e.g., palpable bladder or strong urge to urinate)    Negative: [1] Decreased urination and [2] drinking very little AND [2] dehydration suspected (e.g., dark urine, no urine > 12 hours, very dry mouth, very lightheaded)    Negative: Patient sounds very sick or weak to the triager    Negative: Fever > 100.5 F (38.1 C)    Negative: Side (flank) or lower back pain present    Negative: [1] Can't control passage of urine (i.e., urinary incontinence) AND [2] new onset (< 2 weeks) or worsening    Negative: Urinating more frequently than usual (i.e., frequency)    Protocols used: URINARY SYMPTOMS-ADULT-

## 2017-11-30 NOTE — TELEPHONE ENCOUNTER
"Clinic Action Needed:Yes, Please call patient [phone]  Reason for Call:Patient calling requesting lab only visit for recurring UTI symptoms.  Symptoms starting Monday 11/27/17 with \"strong smell to urine.\" Denies other symptoms \"I feel fine.\"  Stating she is concerned with recurring UTI history. Requesting to come into clinic today at 10 a.m. To lab.  Routed to:Dr Austin Nurse Pool    Yu Kaur RN  [facility]            "

## 2017-12-01 NOTE — PROGRESS NOTES
Kierra, the urine analysis is positive.  Let me see what the culture shows and we can treat as appropriate  Xavier Austin M.D.

## 2017-12-02 LAB
BACTERIA SPEC CULT: ABNORMAL
SPECIMEN SOURCE: ABNORMAL

## 2017-12-03 ENCOUNTER — TELEPHONE (OUTPATIENT)
Dept: OBGYN | Facility: CLINIC | Age: 72
End: 2017-12-03

## 2017-12-03 DIAGNOSIS — N39.0 RECURRENT UTI: ICD-10-CM

## 2017-12-03 DIAGNOSIS — N30.01 ACUTE CYSTITIS WITH HEMATURIA: Primary | ICD-10-CM

## 2017-12-03 RX ORDER — CIPROFLOXACIN 500 MG/1
500 TABLET, FILM COATED ORAL 2 TIMES DAILY
Qty: 14 TABLET | Refills: 0 | Status: SHIPPED | OUTPATIENT
Start: 2017-12-03 | End: 2018-04-13

## 2017-12-03 NOTE — PROGRESS NOTES
Kierra, the urine culture results are positive.  I have sent an Rx for Cipro to the [facility] in [place].  Please have a follow up urine analysis and culture done 2 weeks after completing the antibiotics to make sure this is clearedup  Xavier Austin M.D.

## 2017-12-03 NOTE — TELEPHONE ENCOUNTER
Pt noted to have E-Coli UTI that is sensitive to Cipro.  Pt declines Rx for Macrobid  She has a hx of recurrent UTI.  Will send Rx for Cipro 500 mg po bid for 7 days to listed [facility] in [place].  Pt to have f/u UMAC with culture if positive in 2 weeks after completing antibiotics  Orders placed  Please notify pt  Xavier Austin M.D.

## 2017-12-20 DIAGNOSIS — N39.0 RECURRENT UTI: ICD-10-CM

## 2017-12-20 LAB
ALBUMIN UR-MCNC: NEGATIVE MG/DL
APPEARANCE UR: CLEAR
BILIRUB UR QL STRIP: NEGATIVE
COLOR UR AUTO: YELLOW
GLUCOSE UR STRIP-MCNC: NEGATIVE MG/DL
HGB UR QL STRIP: NEGATIVE
KETONES UR STRIP-MCNC: NEGATIVE MG/DL
LEUKOCYTE ESTERASE UR QL STRIP: ABNORMAL
NITRATE UR QL: NEGATIVE
NON-SQ EPI CELLS #/AREA URNS LPF: NORMAL /LPF
PH UR STRIP: 7 PH (ref 5–7)
RBC #/AREA URNS AUTO: NORMAL /HPF
SOURCE: ABNORMAL
SP GR UR STRIP: 1.01 (ref 1–1.03)
UROBILINOGEN UR STRIP-ACNC: 0.2 EU/DL (ref 0.2–1)
WBC #/AREA URNS AUTO: NORMAL /HPF

## 2017-12-20 PROCEDURE — 81001 URINALYSIS AUTO W/SCOPE: CPT | Performed by: OBSTETRICS & GYNECOLOGY

## 2017-12-20 PROCEDURE — 87086 URINE CULTURE/COLONY COUNT: CPT | Performed by: OBSTETRICS & GYNECOLOGY

## 2017-12-21 LAB
BACTERIA SPEC CULT: NORMAL
SPECIMEN SOURCE: NORMAL

## 2017-12-22 ENCOUNTER — OFFICE VISIT (OUTPATIENT)
Dept: OBGYN | Facility: CLINIC | Age: 72
End: 2017-12-22
Payer: COMMERCIAL

## 2017-12-22 ENCOUNTER — TELEPHONE (OUTPATIENT)
Dept: OBGYN | Facility: CLINIC | Age: 72
End: 2017-12-22

## 2017-12-22 VITALS — WEIGHT: 191 LBS | BODY MASS INDEX: 30.83 KG/M2 | DIASTOLIC BLOOD PRESSURE: 70 MMHG | SYSTOLIC BLOOD PRESSURE: 118 MMHG

## 2017-12-22 DIAGNOSIS — N39.0 RECURRENT UTI: Primary | ICD-10-CM

## 2017-12-22 DIAGNOSIS — N81.11 CYSTOCELE, MIDLINE: ICD-10-CM

## 2017-12-22 PROCEDURE — 99213 OFFICE O/P EST LOW 20 MIN: CPT | Performed by: OBSTETRICS & GYNECOLOGY

## 2017-12-22 RX ORDER — NITROFURANTOIN 25; 75 MG/1; MG/1
100 CAPSULE ORAL 2 TIMES DAILY
Qty: 100 CAPSULE | Refills: 0 | Status: SHIPPED | OUTPATIENT
Start: 2017-12-22 | End: 2017-12-23

## 2017-12-22 NOTE — MR AVS SNAPSHOT
After Visit Summary   12/22/2017    Kierra Mcmullen    MRN: [id number]           Patient Information     Date Of Birth          1945        Visit Information        Provider Department      12/22/2017 11:00 AM Xavier Austin MD [facility]        Today's Diagnoses     Recurrent UTI    -  1    Cystocele, midline          Care Instructions    You can reach your [facility] Care Team any time of the day by calling [phone]. This number will put you in touch with the 24 hour nurse line if the clinic is closed.    To contact your OB/GYN Surgery Scheduler please call [phone]. This is a direct number for your care team between 8 a.m. and 4 p.m. Monday through Friday.    [facility] is open for your convenience: [phone]  Monday through Friday 8 a.m. to 8:30 p.m.  Saturday 9 a.m. to 6 p.m.  Sunday Noon to 6 p.m.    They are closed on all major holidays.            Follow-ups after your visit        Who to contact     If you have questions or need follow up information about today's clinic visit or your schedule please contact [facility] directly at [phone].  Normal or non-critical lab and imaging results will be communicated to you by MyChart, letter or phone within 4 business days after the clinic has received the results. If you do not hear from us within 7 days, please contact the clinic through [employer] or phone. If you have a critical or abnormal lab result, we will notify you by phone as soon as possible.  Submit refill requests through [employer] or call your pharmacy and they will forward the refill request to us. Please allow 3 business days for your refill to be completed.          Additional Information About Your Visit        [employer] Information     [employer] gives you secure access to your electronic health record. If you see a primary care provider, you can also send messages to your care team and make appointments. If you have  questions, please call your primary care clinic.  If you do not have a primary care provider, please call [phone] and they will assist you.        Care EveryWhere ID     This is your Care EveryWhere ID. This could be used by other organizations to access your [facility] medical records  [id number]        Your Vitals Were     Last Period BMI (Body Mass Index)                07/22/1997 30.83 kg/m2           Blood Pressure from Last 3 Encounters:   12/22/17 118/70   11/06/17 122/70   09/12/17 118/70    Weight from Last 3 Encounters:   12/22/17 191 lb (86.6 kg)   11/06/17 190 lb (86.2 kg)   09/12/17 192 lb (87.1 kg)              Today, you had the following     No orders found for display         Today's Medication Changes          These changes are accurate as of: 12/22/17 11:59 PM.  If you have any questions, ask your nurse or doctor.               These medicines have changed or have updated prescriptions.        Dose/Directions    calcium + D 600-200 MG-UNIT Tabs   This may have changed:  See the new instructions.   Generic drug:  calcium carbonate-vitamin D        1 TABLET DAILY   Quantity:  30   Refills:  0       * nitroFURantoin (macrocrystal-monohydrate) 100 MG capsule   Commonly known as:  MACROBID   This may have changed:  Another medication with the same name was added. Make sure you understand how and when to take each.   Used for:  Recurrent UTI   Changed by:  Xavier Austin MD        Dose:  100 mg   Take 1 capsule (100 mg) by mouth 2 times daily   Quantity:  14 capsule   Refills:  1       * nitroFURantoin (macrocrystal-monohydrate) 100 MG capsule   Commonly known as:  MACROBID   This may have changed:  You were already taking a medication with the same name, and this prescription was added. Make sure you understand how and when to take each.   Used for:  Recurrent UTI, Cystocele, midline   Changed by:  Xavier Austin MD        Dose:  100 mg   Take 1 capsule (100 mg) by mouth daily   Quantity:  100  capsule   Refills:  0       * Notice:  This list has 2 medication(s) that are the same as other medications prescribed for you. Read the directions carefully, and ask your doctor or other care provider to review them with you.         Where to get your medicines      Some of these will need a paper prescription and others can be bought over the counter.  Ask your nurse if you have questions.     You don't need a prescription for these medications     nitroFURantoin (macrocrystal-monohydrate) 100 MG capsule                Primary Care Provider Office Phone # Fax #    Matt Whitney [phone]       [address] [facility] [address]        Equal Access to Services     [facility]: Hadii aad ku hadasho Sopearl, waaxda luqadaha, qaybta kaalmada roby, criss michael . So [facility] [phone].    ATENCIÓN: Si habla español, tiene a maxwell disposición servicios gratuitos de asistencia lingüística. [facility] [phone].    We comply with applicable federal civil rights laws and [place] laws. We do not discriminate on the basis of race, color, national origin, age, disability, sex, sexual orientation, or gender identity.            Thank you!     Thank you for choosing [facility]  for your care. Our goal is always to provide you with excellent care. Hearing back from our patients is one way we can continue to improve our services. Please take a few minutes to complete the written survey that you may receive in the mail after your visit with us. Thank you!             Your Updated Medication List - Protect others around you: Learn how to safely use, store and throw away your medicines at www.disposemymeds.org.          This list is accurate as of: 12/22/17 11:59 PM.  Always use your most recent med list.                   Brand Name Dispense Instructions for use Diagnosis    Biotin 5000 MCG Caps           calcium + D 600-200 MG-UNIT Tabs    Generic drug:  calcium carbonate-vitamin D     30    1 TABLET DAILY        calcium polycarbophil 625 MG tablet    FIBERCON     Take 2 tablets by mouth daily        * ciprofloxacin 500 MG tablet    CIPRO    14 tablet    Take 1 tablet (500 mg) by mouth 2 times daily    Acute cystitis with hematuria       * ciprofloxacin 500 MG tablet    CIPRO    14 tablet    Take 1 tablet (500 mg) by mouth 2 times daily    Acute cystitis with hematuria       COMPOUNDED NON-CONTROLLED SUBSTANCE - PHARMACY TO MIX COMPOUNDED MEDICATION    CMPD RX    42 g    Estradiol 0.011% in HRT heavy (cream base)  Place in tube and include vaginal applicator Si gm intravaginally at HS on Mon and Thurs pm as directed    Recurrent UTI       GLUCOSAMINE CHONDROITIN COMPLX PO      Take 2 tablets by mouth daily. 750/600 .        levothyroxine 100 MCG tablet    SYNTHROID/LEVOTHROID    90 tablet    Take 1 tablet (100 mcg) by mouth daily    Routine general medical examination at a health care facility       MULTI-DAY Tabs      one PO QD        * nitroFURantoin (macrocrystal-monohydrate) 100 MG capsule    MACROBID    14 capsule    Take 1 capsule (100 mg) by mouth 2 times daily    Recurrent UTI       * nitroFURantoin (macrocrystal-monohydrate) 100 MG capsule    MACROBID    100 capsule    Take 1 capsule (100 mg) by mouth daily    Recurrent UTI, Cystocele, midline       * Notice:  This list has 4 medication(s) that are the same as other medications prescribed for you. Read the directions carefully, and ask your doctor or other care provider to review them with you.

## 2017-12-22 NOTE — NURSING NOTE
"Chief Complaint   Patient presents with     Medication Follow-up       Initial /70  Wt 191 lb (86.6 kg)  LMP 1997  BMI 30.83 kg/m2 Estimated body mass index is 30.83 kg/(m^2) as calculated from the following:    Height as of 17: 5' 6\" (1.676 m).    Weight as of this encounter: 191 lb (86.6 kg).  BP completed using cuff size: regular        The following HM Due: NONE      The following patient reported/Care Every where data was sent to:  P ABSTRACT QUALITY INITIATIVES [[id number]]        Yolande Lynn, [facility]                 "

## 2017-12-22 NOTE — PATIENT INSTRUCTIONS
You can reach your [facility] Care Team any time of the day by calling [phone]. This number will put you in touch with the 24 hour nurse line if the clinic is closed.    To contact your OB/GYN Surgery Scheduler please call [phone]. This is a direct number for your care team between 8 a.m. and 4 p.m. Monday through Friday.    [facility] is open for your convenience: [phone]  Monday through Friday 8 a.m. to 8:30 p.m.  Saturday 9 a.m. to 6 p.m.  Sunday Noon to 6 p.m.    They are closed on all major holidays.

## 2017-12-22 NOTE — TELEPHONE ENCOUNTER
Pt calling and has a question about the rx for Macrobid given to her today at her appt. She says Dr. Austin told her it would be a 3 month supply and to take a pill once daily. The instructions on the bottle says to take the pill twice daily which would only give the pt a 50 day supply. Please clarify how you would like the patient to take this medication. Patient would like to be sent a [employer] message with the response as she will be out of town.        Stephanie Schwartz RN

## 2017-12-23 RX ORDER — NITROFURANTOIN 25; 75 MG/1; MG/1
100 CAPSULE ORAL DAILY
Qty: 100 CAPSULE | Refills: 0
Start: 2017-12-23 | End: 2018-04-13

## 2017-12-23 NOTE — PROGRESS NOTES
Kierra Mcmullen is a 72 year old female    Postmenopausal on estrogen replacement vaginally presents today for follow-up of recurrent UTIs.  I reviewed the results of her recent urinalysis showing pyuria with a negative culture.  Patient plans to travel to [place] for 3 months over the wintertime with her .  She discussed having monthly urinalysis and urine culture checks.  We discussed using a finger to reduce the cystocele to ensure adequate emptying I also discussed the use of Macrobid 100 mg p.o. daily for 3 months for suppression of her recurrent UTIs.  I reviewed the literature on the subject and discussed at length the risks benefits and alternative forms of therapy.  The patient is a good understanding  Past Medical History:   Diagnosis Date     Adenomatous polyp of colon 2011    needs conolonospy 2016     Hypothyroidism     Abstracted 02     Osteopenia      Past Surgical History:   Procedure Laterality Date     ARTHROPLASTY KNEE Left 2015    Procedure: ARTHROPLASTY KNEE;  Surgeon: Daniel Mack MD;  Location:  OR      NONSPECIFIC PROCEDURE      Cholecystectomy -- Abstracted 02     CHOLECYSTECTOMY       COLONOSCOPY N/A 2016    Procedure: COLONOSCOPY;  Surgeon: Sage Berg MD;  Location:  GI     CYSTOSCOPY  2012    Procedure:CYSTOSCOPY; Surgeon:GAMA GRIDER; Location: OR     DAVINCI HYSTERECTOMY SUPRACERVICAL, SACROCOLPOPEXY, COMBINED  2012    Procedure:COMBINED DAVINCI HYSTERECTOMY SUPRACERVICAL, SACROCOLPOPEXY; DAVINCI LAPAROSCOPIC SUPRACERVICAL HYSTERECTOMY, BILATERAL SALPINGO-OOPHORECTOMY, SACROCOLPOPEXY  WITH COLOPLASTY MESH AND CYSTOPLASTY; Surgeon:GAMA GRIDER; Location: OR      COLONOSCOPY THRU STOMA, DIAGNOSTIC        REMOVAL OF TONSILS,<13 Y/O      Tonsils <12y.o.     HC TOOTH EXTRACTION W/FORCEP          ROS: 10 point ROS neg other than the symptoms noted above in the HPI.  /70  Wt 191 lb (86.6 kg)  LMP  07/22/1997  BMI 30.83 kg/m2  Constitutional: healthy, alert and no distress      (N39.0) Recurrent UTI  (primary encounter diagnosis)  Comment: The patient will have a monthly UA UC while in [place].  She will continue with the Macrobid 100 mg daily and call as needed any concerns  Plan: nitroFURantoin, macrocrystal-monohydrate,         (MACROBID) 100 MG capsule, DISCONTINUED:         nitroFURantoin, macrocrystal-monohydrate,         (MACROBID) 100 MG capsule        Detailed written plan given    (N81.11) Cystocele, midline  Comment: Patient has had a recurrence of her midline cystocele that extends to the introitus.  Is otherwise non-bothersome.  I discussed the possible role of inadequate emptying and her recurrent UTIs.  She will use the recommended measures to ensure adequate emptying  Plan: nitroFURantoin, macrocrystal-monohydrate,         (MACROBID) 100 MG capsule, DISCONTINUED:         nitroFURantoin, macrocrystal-monohydrate,         (MACROBID) 100 MG capsule        We will reassess when the patient gets back from [place] or sooner should concerns arise  15 min spent w > 50% in counseling

## 2018-01-29 ENCOUNTER — TRANSFERRED RECORDS (OUTPATIENT)
Dept: HEALTH INFORMATION MANAGEMENT | Facility: CLINIC | Age: 73
End: 2018-01-29

## 2018-01-30 DIAGNOSIS — N39.0 RECURRENT URINARY TRACT INFECTION: Primary | ICD-10-CM

## 2018-01-30 LAB — CULTURE: NORMAL

## 2018-02-11 ENCOUNTER — MYC MEDICAL ADVICE (OUTPATIENT)
Dept: OBGYN | Facility: CLINIC | Age: 73
End: 2018-02-11

## 2018-02-27 NOTE — TELEPHONE ENCOUNTER
I spoke to the patient.  She had a small amount of spotting on one occasion has had nothing since her bladder function is working well without dysuria.  I feel the spotting may be related to her hormone replacement therapy.  She continues on the Macrobid on a once daily basis and is doing well with this.  She has an appointment to see me in April 2018.  I have asked that she stop the antibiotic suppression 2 weeks before the appointment and have a urinalysis and urine culture 1 week before the appointment to review the results with her at the appointment and make appropriate therapy decisions based on those findings

## 2018-03-01 ENCOUNTER — TRANSFERRED RECORDS (OUTPATIENT)
Dept: HEALTH INFORMATION MANAGEMENT | Facility: CLINIC | Age: 73
End: 2018-03-01

## 2018-03-06 ENCOUNTER — TELEPHONE (OUTPATIENT)
Dept: OBGYN | Facility: CLINIC | Age: 73
End: 2018-03-06

## 2018-03-06 DIAGNOSIS — N39.0 RECURRENT UTI: ICD-10-CM

## 2018-03-06 NOTE — TELEPHONE ENCOUNTER
Pharmacist from  Compounding pharmacy calls to give heads up and states that they are discontinuing the 0.011% estradiol soon,  Pt will get one more fill of this then it will be changed to 0.02%.  Pt may need new rx eventually or they okay to adjust.    Kiki MONTIEL R.N.  [facility]

## 2018-03-08 NOTE — TELEPHONE ENCOUNTER
I spoke with the pharmacist at the compounding pharmacy this morning.  In an effort for standardization they would like to change the compounded estradiol strength to estradiol 0.02% rather than the 0.011% she presently is using.  Instead of a gram vaginally the patient would use 1/2 g vaginally, to deliver in essence the same strength of medication.  [facility] with [facility]ills.  This should last for 1 year.  Xavier Austin MD FACOG

## 2018-03-20 ENCOUNTER — NURSE TRIAGE (OUTPATIENT)
Dept: NURSING | Facility: CLINIC | Age: 73
End: 2018-03-20

## 2018-03-20 NOTE — TELEPHONE ENCOUNTER
Kierra was calling about lab results from March, not being in EPIC. She is going to have the lab refax the results so they can be put into the chart. Only results from January are found scanned into the chart.  Jenn Rivero RN-[facility]

## 2018-03-21 ENCOUNTER — TELEPHONE (OUTPATIENT)
Dept: OBGYN | Facility: CLINIC | Age: 73
End: 2018-03-21

## 2018-03-21 NOTE — TELEPHONE ENCOUNTER
Patient calling from [place]. She has been taking Macrobid all winter long and is scheduled to stop taking it this Friday. She has been getting UA labs done while she is there, and her most recent labs have been archived into her chart. She would like Dr. Austin to review her most recent UA from 3/1 and make sure there is nothing of concern. She is concerned about the WBC and epithelial cells. Discussed with patient that sometimes these cells can show up if the urine catch is not perfectly clean. Please advise patient.        Stephanie Schwartz RN

## 2018-03-22 NOTE — TELEPHONE ENCOUNTER
I spoke with the patient about the recent urinalysis and urine culture results that she had.  The urine culture was positive for subclinical lactobacillus.  The patient is otherwise asymptomatic.  I believe the white cells and epithelial cells seen on her urinalysis are a contaminant.  She is otherwise asymptomatic.  She plans to stop the Macrobid tomorrow and will have a follow-up urinalysis and culture in a week and see me on 9 April.  She will call for any concerns in the interval  Xavier Austin MD FACOG

## 2018-03-29 ENCOUNTER — TRANSFERRED RECORDS (OUTPATIENT)
Dept: HEALTH INFORMATION MANAGEMENT | Facility: CLINIC | Age: 73
End: 2018-03-29

## 2018-04-06 ENCOUNTER — TELEPHONE (OUTPATIENT)
Dept: OBGYN | Facility: CLINIC | Age: 73
End: 2018-04-06

## 2018-04-06 DIAGNOSIS — R82.90 NONSPECIFIC FINDING ON EXAMINATION OF URINE: Primary | ICD-10-CM

## 2018-04-06 DIAGNOSIS — R30.0 DYSURIA: Primary | ICD-10-CM

## 2018-04-06 DIAGNOSIS — R30.0 DYSURIA: ICD-10-CM

## 2018-04-06 LAB
ALBUMIN UR-MCNC: NEGATIVE MG/DL
APPEARANCE UR: ABNORMAL
BACTERIA #/AREA URNS HPF: ABNORMAL /HPF
BILIRUB UR QL STRIP: NEGATIVE
COLOR UR AUTO: YELLOW
GLUCOSE UR STRIP-MCNC: NEGATIVE MG/DL
HGB UR QL STRIP: ABNORMAL
KETONES UR STRIP-MCNC: NEGATIVE MG/DL
LEUKOCYTE ESTERASE UR QL STRIP: ABNORMAL
NITRATE UR QL: NEGATIVE
PH UR STRIP: 5 PH (ref 5–7)
RBC #/AREA URNS AUTO: ABNORMAL /HPF
SOURCE: ABNORMAL
SP GR UR STRIP: <=1.005 (ref 1–1.03)
UROBILINOGEN UR STRIP-ACNC: 0.2 EU/DL (ref 0.2–1)
WBC #/AREA URNS AUTO: >100 /HPF

## 2018-04-06 PROCEDURE — 81001 URINALYSIS AUTO W/SCOPE: CPT | Performed by: OBSTETRICS & GYNECOLOGY

## 2018-04-06 PROCEDURE — 87086 URINE CULTURE/COLONY COUNT: CPT | Performed by: OBSTETRICS & GYNECOLOGY

## 2018-04-06 PROCEDURE — 87186 SC STD MICRODIL/AGAR DIL: CPT | Performed by: OBSTETRICS & GYNECOLOGY

## 2018-04-06 PROCEDURE — 87088 URINE BACTERIA CULTURE: CPT | Performed by: OBSTETRICS & GYNECOLOGY

## 2018-04-06 NOTE — TELEPHONE ENCOUNTER
Pt was taking macrobid prophylactially daily.  She stopped and and now has strong urine odor and cloudiness.  Scheduled for UA today, routed to Dr Austin as fyi.    Dr Austin, do you want culture done regardless of micro results?      Kiki MONTIEL R.N.  [facility]

## 2018-04-07 NOTE — PROGRESS NOTES
Kierra, the urine analysis results shows white blood cells   Let's see what the culture results show and we can come up with the best treatment p[cyrus.  I should have this back in a day or so  thanks  Xavier Austin M.D.

## 2018-04-09 ENCOUNTER — OFFICE VISIT (OUTPATIENT)
Dept: OBGYN | Facility: CLINIC | Age: 73
End: 2018-04-09
Payer: COMMERCIAL

## 2018-04-09 VITALS — DIASTOLIC BLOOD PRESSURE: 72 MMHG | BODY MASS INDEX: 30.02 KG/M2 | SYSTOLIC BLOOD PRESSURE: 120 MMHG | WEIGHT: 186 LBS

## 2018-04-09 DIAGNOSIS — N39.0 RECURRENT UTI: Primary | ICD-10-CM

## 2018-04-09 LAB
BACTERIA SPEC CULT: ABNORMAL
SPECIMEN SOURCE: ABNORMAL

## 2018-04-09 PROCEDURE — 99213 OFFICE O/P EST LOW 20 MIN: CPT | Performed by: OBSTETRICS & GYNECOLOGY

## 2018-04-09 RX ORDER — CIPROFLOXACIN 250 MG/1
250 TABLET, FILM COATED ORAL 2 TIMES DAILY
Qty: 6 TABLET | Refills: 0 | Status: SHIPPED | OUTPATIENT
Start: 2018-04-09 | End: 2018-04-13

## 2018-04-09 NOTE — NURSING NOTE
"Chief Complaint   Patient presents with     UTI       Initial /72  Wt 186 lb (84.4 kg)  LMP 1997  BMI 30.02 kg/m2 Estimated body mass index is 30.02 kg/(m^2) as calculated from the following:    Height as of 17: 5' 6\" (1.676 m).    Weight as of this encounter: 186 lb (84.4 kg).  BP completed using cuff size: regular        The following HM Due: mammogram      The following patient reported/Care Every where data was sent to:  P ABSTRACT QUALITY INITIATIVES [[id number]]        Yolande Lynn [facility]                 "

## 2018-04-09 NOTE — PATIENT INSTRUCTIONS
You can reach your [facility] Care Team any time of the day by calling [phone]. This number will put you in touch with the 24 hour nurse line if the clinic is closed.    To contact your OB/GYN Station Coordinator/Surgery Scheduler please call [phone]. This is a direct number for your care team between 8 a.m. and 4 p.m. Monday through Friday.    [facility] is open for your convenience:  Monday through Friday 8 a.m. to 6 p.m.  Closed weekends and all major holidays.

## 2018-04-09 NOTE — MR AVS SNAPSHOT
"              After Visit Summary   4/9/2018    Kierra Mcmullen    MRN: [id number]           Patient Information     Date Of Birth          1945        Visit Information        Provider Department      4/9/2018 9:00 AM Xavier Austin MD [facility]        Today's Diagnoses     Recurrent UTI    -  1      Care Instructions    You can reach your [facility] Care Team any time of the day by calling [phone]. This number will put you in touch with the 24 hour nurse line if the clinic is closed.    To contact your OB/GYN Station Coordinator/Surgery Scheduler please call [phone]. This is a direct number for your care team between 8 a.m. and 4 p.m. Monday through Friday.    [facility] is open for your convenience:  Monday through Friday 8 a.m. to 6 p.m.  Closed weekends and all major holidays.            Follow-ups after your visit        Follow-up notes from your care team     Return in about 3 weeks (around 4/30/2018).      Your next 10 appointments already scheduled     Apr 13, 2018  7:45 AM CDT   (Arrive by 7:30 AM)   MA SCREENING BILATERAL W/ CAMILLE with [id number]   [facility])    [address], [address]  [facility] [address] [phone]           Three-dimensional (3D) mammograms are available at [facility] locations in [place], [place], [place], [place], [place], [place], [place], and [place]. [facility] locations include [place] [facility] in [place]. Benefits of 3D mammograms include: - Improved rate of cancer detection - Decreases your chance of having to go back for more tests, which means fewer: - \"False-positive\" results (This means that there is an abnormal area but it isn't cancer.) - Invasive testing procedures, such as a biopsy or surgery - Can provide clearer images of the breast if you have dense breast tissue. 3D mammography is an optional exam that anyone can have with a 2D mammogram. It " doesn't replace or take the place of a 2D mammogram. 2D mammograms remain an effective screening test for all women.  Not all insurance companies cover the cost of a 3D mammogram. Check with your insurance.            Apr 13, 2018  8:00 AM CDT   [employer] with Matt Whitney MD   [facility]    [address]  [facility] [address]   [phone]            May 08, 2018  3:30 PM CDT   [facility] Austin MD   [facility]    [address]  [facility] [address]   [phone]              Who to contact     If you have questions or need follow up information about today's clinic visit or your schedule please contact [facility] directly at [phone].  Normal or non-critical lab and imaging results will be communicated to you by [employer], letter or phone within 4 business days after the clinic has received the results. If you do not hear from us within 7 days, please contact the clinic through [employer] or phone. If you have a critical or abnormal lab result, we will notify you by phone as soon as possible.  Submit refill requests through [employer] or call your pharmacy and they will forward the refill request to us. Please allow 3 business days for your refill to be completed.          Additional Information About Your Visit        [employer] Information     [employer] gives you secure access to your electronic health record. If you see a primary care provider, you can also send messages to your care team and make appointments. If you have questions, please call your primary care clinic.  If you do not have a primary care provider, please call [phone] and they will assist you.        Care EveryWhere ID     This is your Care EveryWhere ID. This could be used by other organizations to access your [facility] medical records  [id number]        Your Vitals Were      Last Period BMI (Body Mass Index)                07/22/1997 30.02 kg/m2           Blood Pressure from Last 3 Encounters:   04/09/18 120/72   12/22/17 118/70   11/06/17 122/70    Weight from Last 3 Encounters:   04/09/18 186 lb (84.4 kg)   12/22/17 191 lb (86.6 kg)   11/06/17 190 lb (86.2 kg)                 Today's Medication Changes          These changes are accurate as of 4/9/18 11:59 PM.  If you have any questions, ask your nurse or doctor.               These medicines have changed or have updated prescriptions.        Dose/Directions    calcium + D 600-200 MG-UNIT Tabs   This may have changed:  See the new instructions.   Generic drug:  calcium carbonate-vitamin D        1 TABLET DAILY   Quantity:  30   Refills:  0       * ciprofloxacin 500 MG tablet   Commonly known as:  CIPRO   This may have changed:  Another medication with the same name was added. Make sure you understand how and when to take each.   Used for:  Acute cystitis with hematuria   Changed by:  Xavier Austin MD        Dose:  500 mg   Take 1 tablet (500 mg) by mouth 2 times daily   Quantity:  14 tablet   Refills:  1       * ciprofloxacin 500 MG tablet   Commonly known as:  CIPRO   This may have changed:  Another medication with the same name was added. Make sure you understand how and when to take each.   Used for:  Acute cystitis with hematuria   Changed by:  Xavier Austin MD        Dose:  500 mg   Take 1 tablet (500 mg) by mouth 2 times daily   Quantity:  14 tablet   Refills:  0       * ciprofloxacin 250 MG tablet   Commonly known as:  CIPRO   This may have changed:  You were already taking a medication with the same name, and this prescription was added. Make sure you understand how and when to take each.   Used for:  Recurrent UTI   Changed by:  Xavier Austin MD        Dose:  250 mg   Take 1 tablet (250 mg) by mouth 2 times daily   Quantity:  6 tablet   Refills:  0       * Notice:  This list has 3 medication(s) that are the same as  other medications prescribed for you. Read the directions carefully, and ask your doctor or other care provider to review them with you.         Where to get your medicines      These medications were sent to [facility] [address] [facility] [address] [address] [facility]  [address] [address] [address]     Phone:  [phone]     ciprofloxacin 250 MG tablet                Primary Care Provider Office Phone # Fax #    Matt Whitney [phone]       [address] [facility] [address]        Equal Access to Services     [facility]: Hadii aad ku hadasho Soomaali, waaxda luqadaha, qaybta kaalmada adeegyada, criss michael . So [facility] [phone].    ATENCIÓN: Si habla español, tiene a maxwell disposición servicios gratuitos de asistencia lingüística. [facility] [phone].    We comply with applicable federal civil rights laws and [place] laws. We do not discriminate on the basis of race, color, national origin, age, disability, sex, sexual orientation, or gender identity.            Thank you!     Thank you for choosing [facility]  for your care. Our goal is always to provide you with excellent care. Hearing back from our patients is one way we can continue to improve our services. Please take a few minutes to complete the written survey that you may receive in the mail after your visit with us. Thank you!             Your Updated Medication List - Protect others around you: Learn how to safely use, store and throw away your medicines at www.disposemymeds.org.          This list is accurate as of 4/9/18 11:59 PM.  Always use your most recent med list.                   Brand Name Dispense Instructions for use Diagnosis    Biotin 5000 MCG Caps           calcium + D 600-200 MG-UNIT Tabs   Generic drug:  calcium carbonate-vitamin D     30    1 TABLET DAILY        calcium polycarbophil 625 MG tablet     FIBERCON     Take 2 tablets by mouth daily        * ciprofloxacin 500 MG tablet    CIPRO    14 tablet    Take 1 tablet (500 mg) by mouth 2 times daily    Acute cystitis with hematuria       * ciprofloxacin 500 MG tablet    CIPRO    14 tablet    Take 1 tablet (500 mg) by mouth 2 times daily    Acute cystitis with hematuria       * ciprofloxacin 250 MG tablet    CIPRO    6 tablet    Take 1 tablet (250 mg) by mouth 2 times daily    Recurrent UTI       COMPOUNDED NON-CONTROLLED SUBSTANCE - PHARMACY TO MIX COMPOUNDED MEDICATION    CMPD RX    42 g    Estradiol 0.02% in HRT heavy (cream base)  Place in tube and include vaginal applicator Si/2 gm intravaginally at HS on Mon and Thurs pm as directed    Recurrent UTI       GLUCOSAMINE CHONDROITIN COMPLX PO      Take 2 tablets by mouth daily. 750/600 .        levothyroxine 100 MCG tablet    SYNTHROID/LEVOTHROID    90 tablet    Take 1 tablet (100 mcg) by mouth daily    Routine general medical examination at a health care facility       MULTI-DAY Tabs      one PO QD        * nitroFURantoin (macrocrystal-monohydrate) 100 MG capsule    MACROBID    14 capsule    Take 1 capsule (100 mg) by mouth 2 times daily    Recurrent UTI       * nitroFURantoin (macrocrystal-monohydrate) 100 MG capsule    MACROBID    100 capsule    Take 1 capsule (100 mg) by mouth daily    Recurrent UTI, Cystocele, midline       * Notice:  This list has 5 medication(s) that are the same as other medications prescribed for you. Read the directions carefully, and ask your doctor or other care provider to review them with you.

## 2018-04-10 NOTE — PROGRESS NOTES
I have discussed this result with the patient.  Please see the office visit notes from 4/9/2018  Xavier Austin MD FACOG

## 2018-04-11 NOTE — PROGRESS NOTES
HPI:  Kierra Mcmullen is a 72 year old female  Patient's last menstrual period was 1997.  Postmenopausal vas for contraception, who presents for evaluation of recurrent UTIs.  The patient presently is on compounded vaginal estradiol cream.  The patient completed a 3 month course of suppression with Macrobid 100 mg p.o. daily while on a recent trip to [place] and was symptom-free.  The patient had a follow-up urine analysis done upon returning that was positive and the culture was positive for pansensitive E. coli sensitive to all antibiotics except penicillins.  We reviewed her past medical history history of recurring UTIs consultations with urology previous surgeries and the risks benefits and alternative forms of therapy.  The patient does periodically use a finger vaginally to reduce the cystocele to ensure emptying but states that she could do a little better job at that.  Patient states that when she does have an infection she feels pressure but really no other symptoms.  She denies fevers chills flank pain or other known precipitating event..    Past Medical History:   Diagnosis Date     Adenomatous polyp of colon 2011    needs conolonospy 2016     Hypothyroidism     Abstracted 02     Osteopenia      Past Surgical History:   Procedure Laterality Date     ARTHROPLASTY KNEE Left 2015    Procedure: ARTHROPLASTY KNEE;  Surgeon: Daniel Mack MD;  Location:  OR     C NONSPECIFIC PROCEDURE      Cholecystectomy -- Abstracted 02     CHOLECYSTECTOMY       COLONOSCOPY N/A 2016    Procedure: COLONOSCOPY;  Surgeon: Sage Berg MD;  Location:  GI     CYSTOSCOPY  2012    Procedure:CYSTOSCOPY; Surgeon:GAMA GRIDER; Location: OR     DAVINCI HYSTERECTOMY SUPRACERVICAL, SACROCOLPOPEXY, COMBINED  2012    Procedure:COMBINED DAVINCI HYSTERECTOMY SUPRACERVICAL, SACROCOLPOPEXY; DAVINCI LAPAROSCOPIC SUPRACERVICAL HYSTERECTOMY, BILATERAL SALPINGO-OOPHORECTOMY,  SACROCOLPOPEXY  WITH COLOPLASTY MESH AND CYSTOPLASTY; Surgeon:GAMA GRIDER; Location:[facility] OR     HC COLONOSCOPY THRU STOMA, DIAGNOSTIC       HC REMOVAL OF TONSILS,<11 Y/O      Tonsils <12y.o.     HC TOOTH EXTRACTION W/FORCEP       Family History   Problem Relation Age of Onset     Alzheimer Disease Father      CANCER Mother       non-Hodgkins lymphoma     OSTEOPOROSIS Mother      Arthritis Mother      RA     GASTROINTESTINAL DISEASE Mother      UC     OSTEOPOROSIS Sister      GASTROINTESTINAL DISEASE Sister      diverticulitis     Cardiovascular Maternal Aunt      MI     Breast Cancer Paternal Aunt      Social History     Social History     Marital status:      Spouse name: Sage     Number of children: 1     Years of education: 18     Occupational History     [employer]     Social History Main Topics     Smoking status: Former Smoker     Packs/day: 1.00     Years: 25.00     Types: Cigarettes     Quit date: 1987     Smokeless tobacco: Never Used      Comment:      Alcohol use 6.0 oz/week      Comment: 2-3 glasses of wine per wk     Drug use: No     Sexual activity: Yes     Partners: Male     Birth control/ protection: Surgical      Comment: vas     Other Topics Concern     Caffeine Concern No     Occupational Exposure No     Hobby Hazards No     Sleep Concern No     Stress Concern No     Weight Concern No     Special Diet No     Back Care No     Exercise Yes     walk     Seat Belt Yes     Social History Narrative       Allergies:  Sulfa drugs    Current Outpatient Prescriptions   Medication Sig Dispense Refill     ciprofloxacin (CIPRO) 250 MG tablet Take 1 tablet (250 mg) by mouth 2 times daily 6 tablet 0     COMPOUNDED NON-CONTROLLED SUBSTANCE (CMPD RX) - PHARMACY TO MIX COMPOUNDED MEDICATION Estradiol 0.02% in HRT heavy (cream base)  Place in tube and include vaginal applicator  Si/2 gm intravaginally at HS on Mon and Thurs pm as directed 42 g 3      levothyroxine (SYNTHROID/LEVOTHROID) 100 MCG tablet Take 1 tablet (100 mcg) by mouth daily 90 tablet 2     calcium polycarbophil (FIBERCON) 625 MG tablet Take 2 tablets by mouth daily       Biotin 5000 MCG CAPS        GLUCOSAMINE CHONDROITIN COMPLX PO Take 2 tablets by mouth daily. 750/600 .        CALCIUM + D 600-200 MG-IU OR TABS 1 TABLET DAILY (Patient taking differently: 2 TABLETS DAILY) 30 0     MULTI-DAY OR TABS one PO QD       nitroFURantoin, macrocrystal-monohydrate, (MACROBID) 100 MG capsule Take 1 capsule (100 mg) by mouth daily 100 capsule 0     ciprofloxacin (CIPRO) 500 MG tablet Take 1 tablet (500 mg) by mouth 2 times daily 14 tablet 0     ciprofloxacin (CIPRO) 500 MG tablet Take 1 tablet (500 mg) by mouth 2 times daily 14 tablet 1     nitroFURantoin, macrocrystal-monohydrate, (MACROBID) 100 MG capsule Take 1 capsule (100 mg) by mouth 2 times daily 14 capsule 1       Review Of Systems   ROS: 10 point ROS neg other than the symptoms noted above in the HPI.    Exam:  /72  Wt 186 lb (84.4 kg)  LMP 07/22/1997  BMI 30.02 kg/m2  {Constitutional: healthy, alert and no distress    Assessment/Plan:  (N39.0) Recurrent UTI  (primary encounter diagnosis)  Comment: Risks, benefits, and alternative modes of therapy discussed at length. Pathophysiology of the disease process reviewed, all of the patients questions answered and informed consent obtained.  I discussed using a parietal hand  on aloe wipes to clean the perianal and periurethral areas before and after voiding to minimize bacterial contamination.  We discussed the use of finger and repositioning to ensure adequate emptying and also discussed timed voidings and double voiding.  I did leave her with a prescription for Cipro.  We discussed predisposing factors to both relapsing UTIs and recurrent UTIs  Plan: ciprofloxacin (CIPRO) 250 MG tablet, *UA reflex        to Microscopic and Culture ([place] [facility] (except [place])        I would like to see her back in 2-3 weeks to repeat a UA UC.  If it is positive and conservative measures have failed to resolve her symptoms may consider repair of her cystocele with a paravaginal repair.  A detailed written outline was given our discussion today  15 min spent w > 50% in counseling        Xavier Springer

## 2018-04-13 ENCOUNTER — OFFICE VISIT (OUTPATIENT)
Dept: INTERNAL MEDICINE | Facility: CLINIC | Age: 73
End: 2018-04-13
Payer: COMMERCIAL

## 2018-04-13 ENCOUNTER — HOSPITAL ENCOUNTER (OUTPATIENT)
Dept: MAMMOGRAPHY | Facility: CLINIC | Age: 73
Discharge: HOME | End: 2018-04-13
Attending: INTERNAL MEDICINE | Admitting: INTERNAL MEDICINE
Payer: MEDICARE

## 2018-04-13 VITALS
OXYGEN SATURATION: 97 % | DIASTOLIC BLOOD PRESSURE: 70 MMHG | SYSTOLIC BLOOD PRESSURE: 110 MMHG | WEIGHT: 186 LBS | BODY MASS INDEX: 29.89 KG/M2 | TEMPERATURE: 98 F | HEIGHT: 66 IN | HEART RATE: 72 BPM

## 2018-04-13 DIAGNOSIS — Z12.31 VISIT FOR SCREENING MAMMOGRAM: ICD-10-CM

## 2018-04-13 DIAGNOSIS — E03.9 ACQUIRED HYPOTHYROIDISM: ICD-10-CM

## 2018-04-13 DIAGNOSIS — Z00.00 ROUTINE GENERAL MEDICAL EXAMINATION AT A HEALTH CARE FACILITY: Primary | ICD-10-CM

## 2018-04-13 DIAGNOSIS — Z23 NEED FOR SHINGLES VACCINE: ICD-10-CM

## 2018-04-13 LAB
ALBUMIN SERPL-MCNC: 3.7 G/DL (ref 3.4–5)
ALP SERPL-CCNC: 69 U/L (ref 40–150)
ALT SERPL W P-5'-P-CCNC: 18 U/L (ref 0–50)
ANION GAP SERPL CALCULATED.3IONS-SCNC: 3 MMOL/L (ref 3–14)
AST SERPL W P-5'-P-CCNC: 16 U/L (ref 0–45)
BILIRUB SERPL-MCNC: 0.5 MG/DL (ref 0.2–1.3)
BUN SERPL-MCNC: 15 MG/DL (ref 7–30)
CALCIUM SERPL-MCNC: 9.5 MG/DL (ref 8.5–10.1)
CHLORIDE SERPL-SCNC: 105 MMOL/L (ref 94–109)
CHOLEST SERPL-MCNC: 173 MG/DL
CO2 SERPL-SCNC: 32 MMOL/L (ref 20–32)
CREAT SERPL-MCNC: 0.88 MG/DL (ref 0.52–1.04)
ERYTHROCYTE [DISTWIDTH] IN BLOOD BY AUTOMATED COUNT: 13.7 % (ref 10–15)
GFR SERPL CREATININE-BSD FRML MDRD: 63 ML/MIN/1.7M2
GLUCOSE SERPL-MCNC: 93 MG/DL (ref 70–99)
HCT VFR BLD AUTO: 46.5 % (ref 35–47)
HDLC SERPL-MCNC: 65 MG/DL
HGB BLD-MCNC: 15.1 G/DL (ref 11.7–15.7)
LDLC SERPL CALC-MCNC: 96 MG/DL
MCH RBC QN AUTO: 29.1 PG (ref 26.5–33)
MCHC RBC AUTO-ENTMCNC: 32.5 G/DL (ref 31.5–36.5)
MCV RBC AUTO: 90 FL (ref 78–100)
NONHDLC SERPL-MCNC: 108 MG/DL
PLATELET # BLD AUTO: 246 10E9/L (ref 150–450)
POTASSIUM SERPL-SCNC: 4.4 MMOL/L (ref 3.4–5.3)
PROT SERPL-MCNC: 7.6 G/DL (ref 6.8–8.8)
RBC # BLD AUTO: 5.19 10E12/L (ref 3.8–5.2)
SODIUM SERPL-SCNC: 140 MMOL/L (ref 133–144)
TRIGL SERPL-MCNC: 59 MG/DL
TSH SERPL DL<=0.005 MIU/L-ACNC: 1 MU/L (ref 0.4–4)
WBC # BLD AUTO: 6.1 10E9/L (ref 4–11)

## 2018-04-13 PROCEDURE — 84443 ASSAY THYROID STIM HORMONE: CPT | Performed by: INTERNAL MEDICINE

## 2018-04-13 PROCEDURE — 80053 COMPREHEN METABOLIC PANEL: CPT | Performed by: INTERNAL MEDICINE

## 2018-04-13 PROCEDURE — 99397 PER PM REEVAL EST PAT 65+ YR: CPT | Mod: 25 | Performed by: INTERNAL MEDICINE

## 2018-04-13 PROCEDURE — 77067 SCR MAMMO BI INCL CAD: CPT

## 2018-04-13 PROCEDURE — 90750 HZV VACC RECOMBINANT IM: CPT | Performed by: INTERNAL MEDICINE

## 2018-04-13 PROCEDURE — 85027 COMPLETE CBC AUTOMATED: CPT | Performed by: INTERNAL MEDICINE

## 2018-04-13 PROCEDURE — 80061 LIPID PANEL: CPT | Performed by: INTERNAL MEDICINE

## 2018-04-13 PROCEDURE — 36415 COLL VENOUS BLD VENIPUNCTURE: CPT | Performed by: INTERNAL MEDICINE

## 2018-04-13 PROCEDURE — 90471 IMMUNIZATION ADMIN: CPT | Performed by: INTERNAL MEDICINE

## 2018-04-13 RX ORDER — LEVOTHYROXINE SODIUM 100 UG/1
100 TABLET ORAL DAILY
Qty: 90 TABLET | Refills: 3 | Status: SHIPPED | OUTPATIENT
Start: 2018-04-13 | End: 2018-12-04

## 2018-04-13 NOTE — LETTER
April 13, 2018      Kierra ZAYAS Benedict  [address] [place] [address]        Dear ,    We are writing to inform you of your test results.    Your test results fall within the expected range(s) or remain unchanged from previous results.  Please continue with current treatment plan.    Resulted Orders   CBC with platelets   Result Value Ref Range    WBC 6.1 4.0 - 11.0 10e9/L    RBC Count 5.19 3.8 - 5.2 10e12/L    Hemoglobin 15.1 11.7 - 15.7 g/dL    Hematocrit 46.5 35.0 - 47.0 %    MCV 90 78 - 100 fl    MCH 29.1 26.5 - 33.0 pg    MCHC 32.5 31.5 - 36.5 g/dL    RDW 13.7 10.0 - 15.0 %    Platelet Count 246 150 - 450 10e9/L   Comprehensive metabolic panel   Result Value Ref Range    Sodium 140 133 - 144 mmol/L    Potassium 4.4 3.4 - 5.3 mmol/L    Chloride 105 94 - 109 mmol/L    Carbon Dioxide 32 20 - 32 mmol/L    Anion Gap 3 3 - 14 mmol/L    Glucose 93 70 - 99 mg/dL      Comment:      Fasting specimen    Urea Nitrogen 15 7 - 30 mg/dL    Creatinine 0.88 0.52 - 1.04 mg/dL    GFR Estimate 63 >60 mL/min/1.7m2      Comment:      Non  GFR Calc    GFR Estimate If Black 76 >60 mL/min/1.7m2      Comment:       GFR Calc    Calcium 9.5 8.5 - 10.1 mg/dL    Bilirubin Total 0.5 0.2 - 1.3 mg/dL    Albumin 3.7 3.4 - 5.0 g/dL    Protein Total 7.6 6.8 - 8.8 g/dL    Alkaline Phosphatase 69 40 - 150 U/L    ALT 18 0 - 50 U/L    AST 16 0 - 45 U/L   Lipid panel reflex to direct LDL Fasting   Result Value Ref Range    Cholesterol 173 <200 mg/dL    Triglycerides 59 <150 mg/dL      Comment:      Fasting specimen    HDL Cholesterol 65 >49 mg/dL    LDL Cholesterol Calculated 96 <100 mg/dL      Comment:      Desirable:       <100 mg/dl    Non HDL Cholesterol 108 <130 mg/dL   TSH with free T4 reflex   Result Value Ref Range    TSH 1.00 0.40 - 4.00 mU/L       If you have any questions or concerns, please call the clinic at the number listed above.       Sincerely,        Matt Whitney,  MD

## 2018-04-13 NOTE — NURSING NOTE
"Chief Complaint   Patient presents with     Wellness Visit     Fasting Px       Initial /70  Pulse 72  Temp 98  F (36.7  C) (Oral)  Ht 5' 6\" (1.676 m)  Wt 186 lb (84.4 kg)  LMP 07/22/1997  SpO2 97%  BMI 30.02 kg/m2 Estimated body mass index is 30.02 kg/(m^2) as calculated from the following:    Height as of this encounter: 5' 6\" (1.676 m).    Weight as of this encounter: 186 lb (84.4 kg).  Medication Reconciliation: complete   Rosalind Valadez MA      "

## 2018-04-13 NOTE — PROGRESS NOTES
SUBJECTIVE:   Kierra Mcmullen is a 73 year old female who presents for Preventive Visit.    Are you in the first 12 months of your Medicare Part B coverage?  No    Healthy Habits:    Do you get at least three servings of calcium containing foods daily (dairy, green leafy vegetables, etc.)? yes    Amount of exercise or daily activities, outside of work: 5 day(s) per week    Problems taking medications regularly No    Medication side effects: No    Have you had an eye exam in the past two years? yes    Do you see a dentist twice per year? no    Do you have sleep apnea, excessive snoring or daytime drowsiness?yes      Ability to successfully perform activities of daily living: Yes, no assistance needed    Home safety:  none identified     Hearing impairment: No    Fall risk:  Fallen 2 or more times in the past year?: No  Any fall with injury in the past year?: No        COGNITIVE SCREEN  1) Repeat 3 items (Banana, Sunrise, Chair)    2) Clock draw: NORMAL  3) 3 item recall: Recalls 3 objects  Results: 3 items recalled: COGNITIVE IMPAIRMENT LESS LIKELY    Mini-CogTM Copyright S Marshall. Licensed by the author for use in [facility]; reprinted with permission (mariana@[facility]). All rights reserved.            PROBLEMS TO ADD ON...  Has hypothyroidism. On Synthroid. No heat /cold intolerance, heart palpitations, weight loss/ gain , heart palpitations, change in bowel habits.      Reviewed and updated as needed this visit by clinical staff  Tobacco  Allergies  Meds  Med Hx  Surg Hx  Fam Hx  Soc Hx        Reviewed and updated as needed this visit by Provider        Social History   Substance Use Topics     Smoking status: Former Smoker     Packs/day: 1.00     Years: 25.00     Types: Cigarettes     Quit date: 1/1/1987     Smokeless tobacco: Never Used      Comment: 1985     Alcohol use 6.0 oz/week      Comment: 2-3 glasses of wine per wk       If you drink alcohol do you typically have >3 drinks per day or >7  drinks per week? No                        Today's PHQ-2 Score:   PHQ-2 ( 1999 Pfizer) 4/13/2018 6/19/2017   Q1: Little interest or pleasure in doing things 0 0   Q2: Feeling down, depressed or hopeless 0 0   PHQ-2 Score 0 0   Q1: Little interest or pleasure in doing things - -   Q2: Feeling down, depressed or hopeless - -   PHQ-2 Score - -       Do you feel safe in your environment - Yes    Do you have a Health Care Directive?: Yes: Advance Directive has been received and scanned.    Current providers sharing in care for this patient include:   Patient Care Team:  Matt Whitney MD as PCP - General (Internal Medicine)    The following health maintenance items are reviewed in Epic and correct as of today:  Health Maintenance   Topic Date Due     TSH Q1 YEAR  04/12/2018     FALL RISK ASSESSMENT  04/12/2018     MAMMO Q1 YR  04/12/2018     INFLUENZA VACCINE (SYSTEM ASSIGNED)  09/01/2018     COLONOSCOPY Q5 YR  05/05/2021     LIPID SCREEN Q5 YR FEMALE (SYSTEM ASSIGNED)  04/12/2022     ADVANCE DIRECTIVE PLANNING Q5 YRS  06/27/2022     TETANUS Q10 YR  10/01/2022     DEXA SCAN SCREENING (SYSTEM ASSIGNED)  Completed     PNEUMOCOCCAL  Completed     HEPATITIS C SCREENING  Addressed     Labs reviewed in EPIC  BP Readings from Last 3 Encounters:   04/13/18 110/70   04/09/18 120/72   12/22/17 118/70    Wt Readings from Last 3 Encounters:   04/13/18 186 lb (84.4 kg)   04/09/18 186 lb (84.4 kg)   12/22/17 191 lb (86.6 kg)                        ROS:  CONSTITUTIONAL: NEGATIVE for fever, chills, change in weight  INTEGUMENTARY/SKIN: NEGATIVE for worrisome rashes, moles or lesions  EYES: NEGATIVE for vision changes or irritation  ENT/MOUTH: NEGATIVE for ear, mouth and throat problems  RESP: NEGATIVE for significant cough or SOB  BREAST: NEGATIVE for masses, tenderness or discharge  CV: NEGATIVE for chest pain, palpitations or peripheral edema  GI: NEGATIVE for nausea, abdominal pain, heartburn, or change in bowel habits  :  "NEGATIVE for frequency, dysuria, or hematuria  MUSCULOSKELETAL: NEGATIVE for significant arthralgias or myalgia  NEURO: NEGATIVE for weakness, dizziness or paresthesias  ENDOCRINE: NEGATIVE for temperature intolerance, skin/hair changes  HEME: NEGATIVE for bleeding problems  PSYCHIATRIC: NEGATIVE for changes in mood or affect    OBJECTIVE:   /70  Pulse 72  Temp 98  F (36.7  C) (Oral)  Ht 5' 6\" (1.676 m)  Wt 186 lb (84.4 kg)  LMP 07/22/1997  SpO2 97%  BMI 30.02 kg/m2 Estimated body mass index is 30.02 kg/(m^2) as calculated from the following:    Height as of this encounter: 5' 6\" (1.676 m).    Weight as of this encounter: 186 lb (84.4 kg).  EXAM:   GENERAL: healthy, alert and no distress  EYES: Eyes grossly normal to inspection, PERRL and conjunctivae and sclerae normal  HENT: ear canals and TM's normal, nose and mouth without ulcers or lesions  NECK: no adenopathy, no asymmetry, masses, or scars and thyroid normal to palpation  RESP: lungs clear to auscultation - no rales, rhonchi or wheezes  BREAST: normal without masses, tenderness or nipple discharge and no palpable axillary masses or adenopathy  CV: regular rate and rhythm, normal S1 S2, no S3 or S4, no murmur, click or rub, no peripheral edema and peripheral pulses strong  ABDOMEN: soft, nontender, no hepatosplenomegaly, no masses and bowel sounds normal  MS: no gross musculoskeletal defects noted, no edema  SKIN: no suspicious lesions or rashes  NEURO: Normal strength and tone, mentation intact and speech normal  PSYCH: mentation appears normal, affect normal/bright    ASSESSMENT / PLAN:       ICD-10-CM    1. Routine general medical examination at a health care facility Z00.00 levothyroxine (SYNTHROID/LEVOTHROID) 100 MCG tablet     CBC with platelets     Comprehensive metabolic panel     Lipid panel reflex to direct LDL Fasting     TSH with free T4 reflex   2. Acquired hypothyroidism E03.9 CBC with platelets     Comprehensive metabolic panel     " "Lipid panel reflex to direct LDL Fasting     TSH with free T4 reflex   3. Need for shingles vaccine Z23 ZOSTER VACCINE RECOMBINANT ADJUVANTED IM NJX (SHINGRIX)       End of Life Planning:  Patient currently has an advanced directive: Yes.  Practitioner is supportive of decision.    COUNSELING:  Reviewed preventive health counseling, as reflected in patient instructions       Regular exercise       Healthy diet/nutrition       Vision screening       Hearing screening       Dental care       Colon cancer screening        Estimated body mass index is 30.02 kg/(m^2) as calculated from the following:    Height as of this encounter: 5' 6\" (1.676 m).    Weight as of this encounter: 186 lb (84.4 kg).       reports that she quit smoking about 31 years ago. Her smoking use included Cigarettes. She has a 25.00 pack-year smoking history. She has never used smokeless tobacco.      Appropriate preventive services were discussed with this patient, including applicable screening as appropriate for cardiovascular disease, diabetes, osteopenia/osteoporosis, and glaucoma.  As appropriate for age/gender, discussed screening for colorectal cancer, prostate cancer, breast cancer, and cervical cancer. Checklist reviewing preventive services available has been given to the patient.    Reviewed patients plan of care and provided an AVS. The Intermediate Care Plan ( asthma action plan, low back pain action plan, and migraine action plan) for Kierra meets the Care Plan requirement. This Care Plan has been established and reviewed with the Patient.    Counseling Resources:  ATP IV Guidelines  Pooled Cohorts Equation Calculator  Breast Cancer Risk Calculator  FRAX Risk Assessment  ICSI Preventive Guidelines  Dietary Guidelines for Americans, 2010  USDA's MyPlate  ASA Prophylaxis  Lung CA Screening    Matt Whitney MD  [facility]  "

## 2018-04-13 NOTE — MR AVS SNAPSHOT
After Visit Summary   4/13/2018    Kierra Mcmullen    MRN: [id number]           Patient Information     Date Of Birth          1945        Visit Information        Provider Department      4/13/2018 8:00 AM Matt Whitney MD [facility]        Today's Diagnoses     Routine general medical examination at a health care facility    -  1    Acquired hypothyroidism          Care Instructions      Preventive Health Recommendations    Female Ages 65 +    Yearly exam:     See your health care provider every year in order to  o Review health changes.   o Discuss preventive care.    o Review your medicines if your doctor has prescribed any.      You no longer need a yearly Pap test unless you've had an abnormal Pap test in the past 10 years. If you have vaginal symptoms, such as bleeding or discharge, be sure to talk with your provider about a Pap test.      Every 1 to 2 years, have a mammogram.  If you are over 69, talk with your health care provider about whether or not you want to continue having screening mammograms.      Every 10 years, have a colonoscopy. Or, have a yearly FIT test (stool test). These exams will check for colon cancer.       Have a cholesterol test every 5 years, or more often if your doctor advises it.       Have a diabetes test (fasting glucose) every three years. If you are at risk for diabetes, you should have this test more often.       At age 65, have a bone density scan (DEXA) to check for osteoporosis (brittle bone disease).    Shots:    Get a flu shot each year.    Get a tetanus shot every 10 years.    Talk to your doctor about your pneumonia vaccines. There are now two you should receive - Pneumovax (PPSV 23) and Prevnar (PCV 13).    Talk to your doctor about the shingles vaccine.    Talk to your doctor about the hepatitis B vaccine.    Nutrition:     Eat at least 5 servings of fruits and vegetables each day.      Eat whole-grain bread, whole-wheat pasta  and brown rice instead of white grains and rice.      Talk to your provider about Calcium and Vitamin D.     Lifestyle    Exercise at least 150 minutes a week (30 minutes a day, 5 days a week). This will help you control your weight and prevent disease.      Limit alcohol to one drink per day.      No smoking.       Wear sunscreen to prevent skin cancer.       See your dentist twice a year for an exam and cleaning.      See your eye doctor every 1 to 2 years to screen for conditions such as glaucoma, macular degeneration and cataracts.          Follow-ups after your visit        Your next 10 appointments already scheduled     May 08, 2018  3:30 PM CDT   SHORT with Xavier Austin MD   [facility]    [address]  [facility] [address] [phone]              Who to contact     If you have questions or need follow up information about today's clinic visit or your schedule please contact [facility] directly at [phone].  Normal or non-critical lab and imaging results will be communicated to you by [employer], letter or phone within 4 business days after the clinic has received the results. If you do not hear from us within 7 days, please contact the clinic through [employer] or phone. If you have a critical or abnormal lab result, we will notify you by phone as soon as possible.  Submit refill requests through [employer] or call your pharmacy and they will forward the refill request to us. Please allow 3 business days for your refill to be completed.          Additional Information About Your Visit        [employer] Information     [employer] gives you secure access to your electronic health record. If you see a primary care provider, you can also send messages to your care team and make appointments. If you have questions, please call your primary care clinic.  If you do not have a primary care provider, please call [phone] and they will assist  "you.        Care EveryWhere ID     This is your Care EveryWhere ID. This could be used by other organizations to access your [facility] medical records  [id number]        Your Vitals Were     Pulse Temperature Height Last Period Pulse Oximetry BMI (Body Mass Index)    72 98  F (36.7  C) (Oral) 5' 6\" (1.676 m) 07/22/1997 97% 30.02 kg/m2       Blood Pressure from Last 3 Encounters:   04/13/18 110/70   04/09/18 120/72   12/22/17 118/70    Weight from Last 3 Encounters:   04/13/18 186 lb (84.4 kg)   04/09/18 186 lb (84.4 kg)   12/22/17 191 lb (86.6 kg)              We Performed the Following     CBC with platelets     Comprehensive metabolic panel     Lipid panel reflex to direct LDL Fasting     TSH with free T4 reflex          Today's Medication Changes          These changes are accurate as of 4/13/18  8:37 AM.  If you have any questions, ask your nurse or doctor.               These medicines have changed or have updated prescriptions.        Dose/Directions    calcium + D 600-200 MG-UNIT Tabs   This may have changed:  See the new instructions.   Generic drug:  calcium carbonate-vitamin D        1 TABLET DAILY   Quantity:  30   Refills:  0            Where to get your medicines      These medications were sent to [facility] [address]  [address], [facility] [address]     Phone:  [phone]     levothyroxine 100 MCG tablet                Primary Care Provider Office Phone # Fax #    Matt Whitney [phone]       [address] [facility] [address]        Equal Access to Services     [facility]: Hadii shahana haley hadashsherie Sopearl, waaxda luqadaha, qaybta kaalmada criss ca. So [facility] [phone].    ATENCIÓN: Si habla español, tiene a maxwell disposición servicios gratuitos de asistencia lingüística. Llame al [phone].    We comply with applicable federal civil rights laws and [place] laws. We " do not discriminate on the basis of race, color, national origin, age, disability, sex, sexual orientation, or gender identity.            Thank you!     Thank you for choosing [facility]  for your care. Our goal is always to provide you with excellent care. Hearing back from our patients is one way we can continue to improve our services. Please take a few minutes to complete the written survey that you may receive in the mail after your visit with us. Thank you!             Your Updated Medication List - Protect others around you: Learn how to safely use, store and throw away your medicines at www.disposemymeds.org.          This list is accurate as of 18  8:37 AM.  Always use your most recent med list.                   Brand Name Dispense Instructions for use Diagnosis    Biotin 5000 MCG Caps           calcium + D 600-200 MG-UNIT Tabs   Generic drug:  calcium carbonate-vitamin D     30    1 TABLET DAILY        calcium polycarbophil 625 MG tablet    FIBERCON     Take 2 tablets by mouth daily        COMPOUNDED NON-CONTROLLED SUBSTANCE - PHARMACY TO MIX COMPOUNDED MEDICATION    CMPD RX    42 g    Estradiol 0.02% in HRT heavy (cream base)  Place in tube and include vaginal applicator Si/2 gm intravaginally at HS on Mon and Th pm as directed    Recurrent UTI       GLUCOSAMINE CHONDROITIN COMPLX PO      Take 1 tablet by mouth daily 750/600        levothyroxine 100 MCG tablet    SYNTHROID/LEVOTHROID    90 tablet    Take 1 tablet (100 mcg) by mouth daily    Routine general medical examination at a health care facility       MULTI-DAY Tabs      one PO QD

## 2018-04-17 ENCOUNTER — TRANSFERRED RECORDS (OUTPATIENT)
Dept: HEALTH INFORMATION MANAGEMENT | Facility: CLINIC | Age: 73
End: 2018-04-17

## 2018-04-18 DIAGNOSIS — R82.90 NONSPECIFIC FINDING ON EXAMINATION OF URINE: Primary | ICD-10-CM

## 2018-04-18 DIAGNOSIS — N39.0 RECURRENT UTI: ICD-10-CM

## 2018-04-18 LAB

## 2018-04-18 PROCEDURE — 81001 URINALYSIS AUTO W/SCOPE: CPT | Performed by: OBSTETRICS & GYNECOLOGY

## 2018-04-18 PROCEDURE — 87086 URINE CULTURE/COLONY COUNT: CPT | Performed by: OBSTETRICS & GYNECOLOGY

## 2018-04-19 LAB
BACTERIA SPEC CULT: NO GROWTH
SPECIMEN SOURCE: NORMAL

## 2018-04-19 NOTE — PROGRESS NOTES
Kierra, the urinalysis results returned with white blood cells suggesting a possible infection.  Await the results of the culture and will be in contact with you for treatment recommendations  Thank you  Xavier Austin M.D.

## 2018-04-20 NOTE — PROGRESS NOTES
Kierra, your urine culture results are negative.  I do not see any evidence of infection.  I would continue with the hygiene and bladder emptying techniques that we talked about at your recent office visit.  Please let me know if you have any bladder symptoms and I will recheck a urinalysis and urine culture with appropriate therapy to follow based on those results.  Please let me know if you have any questions  Thank you  Xavier Austin M.D.

## 2018-04-25 DIAGNOSIS — N39.0 RECURRENT UTI: Primary | ICD-10-CM

## 2018-04-30 DIAGNOSIS — N39.0 RECURRENT UTI: ICD-10-CM

## 2018-04-30 LAB
ALBUMIN UR-MCNC: NEGATIVE MG/DL
APPEARANCE UR: ABNORMAL
BACTERIA #/AREA URNS HPF: ABNORMAL /HPF
BILIRUB UR QL STRIP: NEGATIVE
COLOR UR AUTO: YELLOW
GLUCOSE UR STRIP-MCNC: NEGATIVE MG/DL
HGB UR QL STRIP: NEGATIVE
KETONES UR STRIP-MCNC: NEGATIVE MG/DL
LEUKOCYTE ESTERASE UR QL STRIP: ABNORMAL
NITRATE UR QL: NEGATIVE
NON-SQ EPI CELLS #/AREA URNS LPF: ABNORMAL /LPF
PH UR STRIP: 7 PH (ref 5–7)
RBC #/AREA URNS AUTO: ABNORMAL /HPF
SOURCE: ABNORMAL
SP GR UR STRIP: 1.01 (ref 1–1.03)
UROBILINOGEN UR STRIP-ACNC: 0.2 EU/DL (ref 0.2–1)
WBC #/AREA URNS AUTO: ABNORMAL /HPF

## 2018-04-30 PROCEDURE — 81001 URINALYSIS AUTO W/SCOPE: CPT | Performed by: OBSTETRICS & GYNECOLOGY

## 2018-04-30 PROCEDURE — 87086 URINE CULTURE/COLONY COUNT: CPT | Performed by: OBSTETRICS & GYNECOLOGY

## 2018-04-30 NOTE — PROGRESS NOTES
Kierra, the urinalysis result shows white blood cells suggestive of possible infection.  Let us wait to see what the culture results show and then we can treat accordingly  Xavier Austin M.D.

## 2018-05-01 LAB
BACTERIA SPEC CULT: NORMAL
SPECIMEN SOURCE: NORMAL

## 2018-05-02 NOTE — PROGRESS NOTES
Kierra, the urine culture results are negative.  Again I believe the white blood cells seen on urinalysis are a contaminant and did not represent a urinary tract infection.  Please let me know if you are having any symptoms that would otherwise suggest infection  Thank you  Xavier Austin M.D.

## 2018-05-03 ENCOUNTER — TRANSFERRED RECORDS (OUTPATIENT)
Dept: HEALTH INFORMATION MANAGEMENT | Facility: CLINIC | Age: 73
End: 2018-05-03

## 2018-05-08 ENCOUNTER — OFFICE VISIT (OUTPATIENT)
Dept: OBGYN | Facility: CLINIC | Age: 73
End: 2018-05-08
Payer: COMMERCIAL

## 2018-05-08 VITALS — DIASTOLIC BLOOD PRESSURE: 78 MMHG | SYSTOLIC BLOOD PRESSURE: 118 MMHG | WEIGHT: 185.5 LBS | BODY MASS INDEX: 29.94 KG/M2

## 2018-05-08 DIAGNOSIS — N39.0 RECURRENT UTI: Primary | ICD-10-CM

## 2018-05-08 LAB
ALBUMIN UR-MCNC: NEGATIVE MG/DL
APPEARANCE UR: CLEAR
BACTERIA #/AREA URNS HPF: ABNORMAL /HPF
BILIRUB UR QL STRIP: NEGATIVE
COLOR UR AUTO: YELLOW
GLUCOSE UR STRIP-MCNC: NEGATIVE MG/DL
HGB UR QL STRIP: NEGATIVE
KETONES UR STRIP-MCNC: ABNORMAL MG/DL
LEUKOCYTE ESTERASE UR QL STRIP: ABNORMAL
NITRATE UR QL: NEGATIVE
NON-SQ EPI CELLS #/AREA URNS LPF: ABNORMAL /LPF
PH UR STRIP: 6.5 PH (ref 5–7)
RBC #/AREA URNS AUTO: ABNORMAL /HPF
SOURCE: ABNORMAL
SP GR UR STRIP: 1.01 (ref 1–1.03)
UROBILINOGEN UR STRIP-ACNC: 0.2 EU/DL (ref 0.2–1)
WBC #/AREA URNS AUTO: ABNORMAL /HPF

## 2018-05-08 PROCEDURE — 81001 URINALYSIS AUTO W/SCOPE: CPT | Performed by: OBSTETRICS & GYNECOLOGY

## 2018-05-08 PROCEDURE — 99213 OFFICE O/P EST LOW 20 MIN: CPT | Performed by: OBSTETRICS & GYNECOLOGY

## 2018-05-08 NOTE — NURSING NOTE
"Chief Complaint   Patient presents with     UTI       Initial /78  Wt 185 lb 8 oz (84.1 kg)  LMP 1997  BMI 29.94 kg/m2 Estimated body mass index is 29.94 kg/(m^2) as calculated from the following:    Height as of 18: 5' 6\" (1.676 m).    Weight as of this encounter: 185 lb 8 oz (84.1 kg).  BP completed using cuff size: regular        The following HM Due: NONE      The following patient reported/Care Every where data was sent to:  P ABSTRACT QUALITY INITIATIVES [[id number]]        Yolande Lynn, [facility]                 "

## 2018-05-08 NOTE — PATIENT INSTRUCTIONS
You can reach your [facility] Care Team any time of the day by calling [phone]. This number will put you in touch with the 24 hour nurse line if the clinic is closed.    To contact your OB/GYN Station Coordinator/Surgery Scheduler please call [phone]. This is a direct number for your care team between 8 a.m. and 4 p.m. Monday through Friday.    [facility] is open for your convenience:  Monday through Friday 8 a.m. to 6 p.m.  Closed weekends and all major holidays.

## 2018-05-08 NOTE — MR AVS SNAPSHOT
After Visit Summary   5/8/2018    Kierra Mcmullen    MRN: [id number]           Patient Information     Date Of Birth          1945        Visit Information        Provider Department      5/8/2018 3:30 PM Xavier Austin MD [facility]        Today's Diagnoses     Recurrent UTI    -  1      Care Instructions    You can reach your [place] Care Team any time of the day by calling [phone]. This number will put you in touch with the 24 hour nurse line if the clinic is closed.    To contact your OB/GYN Station Coordinator/Surgery Scheduler please call [phone]. This is a direct number for your care team between 8 a.m. and 4 p.m. Monday through Friday.    [facility] is open for your convenience:  Monday through Friday 8 a.m. to 6 p.m.  Closed weekends and all major holidays.            Follow-ups after your visit        Follow-up notes from your care team     Return in about 1 month (around 6/8/2018).      Future tests that were ordered for you today     Open Future Orders        Priority Expected Expires Ordered    *UA reflex to Microscopic and Culture ([place] [facility] (except [place] and [place]) Routine 6/8/2018 5/8/2019 5/8/2018    *UA reflex to Microscopic and Culture ([place] [facility] (except [place] and [place]) Routine 7/8/2018 5/8/2019 5/8/2018    *UA reflex to Microscopic and Culture ([place] [facility] (except [place] and [place]) Routine 8/8/2018 5/8/2019 5/8/2018            Who to contact     If you have questions or need follow up information about today's clinic visit or your schedule please contact [facility] directly at [phone].  Normal or non-critical lab and imaging results will be communicated to you by MyChart, letter or phone within 4 business days after the clinic has received the results. If you do not hear from us within 7 days, please contact the clinic through MyChart or phone. If  you have a critical or abnormal lab result, we will notify you by phone as soon as possible.  Submit refill requests through [employer] or call your pharmacy and they will forward the refill request to us. Please allow 3 business days for your refill to be completed.          Additional Information About Your Visit        [employer] Information     [employer] gives you secure access to your electronic health record. If you see a primary care provider, you can also send messages to your care team and make appointments. If you have questions, please call your primary care clinic.  If you do not have a primary care provider, please call [phone] and they will assist you.        Care EveryWhere ID     This is your Care EveryWhere ID. This could be used by other organizations to access your [place] medical records  [id number]        Your Vitals Were     Last Period BMI (Body Mass Index)                07/22/1997 29.94 kg/m2           Blood Pressure from Last 3 Encounters:   05/08/18 118/78   04/13/18 110/70   04/09/18 120/72    Weight from Last 3 Encounters:   05/08/18 185 lb 8 oz (84.1 kg)   04/13/18 186 lb (84.4 kg)   04/09/18 186 lb (84.4 kg)              We Performed the Following     UA reflex to Microscopic and Culture     Urine Microscopic          Today's Medication Changes          These changes are accurate as of 5/8/18 11:59 PM.  If you have any questions, ask your nurse or doctor.               These medicines have changed or have updated prescriptions.        Dose/Directions    calcium + D 600-200 MG-UNIT Tabs   This may have changed:  See the new instructions.   Generic drug:  calcium carbonate-vitamin D        1 TABLET DAILY   Quantity:  30   Refills:  0                Primary Care Provider Office Phone # Fax #    Matt Whitney [phone]       [address] [facility] [address]        Equal Access to Services     SAGAR NIETO AH: renu Cavazos,  estrada winter vinicriss pruitt michelain hayaan adeeg kharash la'aan ah. So [facility] [phone].    ATENCIÓN: Si miya ferrari, tiene a maxwell disposición servicios gratuitos de asistencia lingüística. Toya al [phone].    We comply with applicable federal civil rights laws and [place] laws. We do not discriminate on the basis of race, color, national origin, age, disability, sex, sexual orientation, or gender identity.            Thank you!     Thank you for choosing [facility]  for your care. Our goal is always to provide you with excellent care. Hearing back from our patients is one way we can continue to improve our services. Please take a few minutes to complete the written survey that you may receive in the mail after your visit with us. Thank you!             Your Updated Medication List - Protect others around you: Learn how to safely use, store and throw away your medicines at www.disposemymeds.org.          This list is accurate as of 18 11:59 PM.  Always use your most recent med list.                   Brand Name Dispense Instructions for use Diagnosis    Biotin 5000 MCG Caps           calcium + D 600-200 MG-UNIT Tabs   Generic drug:  calcium carbonate-vitamin D     30    1 TABLET DAILY        calcium polycarbophil 625 MG tablet    FIBERCON     Take 2 tablets by mouth daily        COMPOUNDED NON-CONTROLLED SUBSTANCE - PHARMACY TO MIX COMPOUNDED MEDICATION    CMPD RX    42 g    Estradiol 0.02% in HRT heavy (cream base)  Place in tube and include vaginal applicator Si/2 gm intravaginally at HS on Mon and Thurs pm as directed    Recurrent UTI       GLUCOSAMINE CHONDROITIN COMPLX PO      Take 1 tablet by mouth daily 750/600        levothyroxine 100 MCG tablet    SYNTHROID/LEVOTHROID    90 tablet    Take 1 tablet (100 mcg) by mouth daily    Routine general medical examination at a health care facility       MULTI-DAY Tabs      one PO QD

## 2018-05-09 ENCOUNTER — TELEPHONE (OUTPATIENT)
Dept: OBGYN | Facility: CLINIC | Age: 73
End: 2018-05-09

## 2018-05-09 NOTE — PROGRESS NOTES
Kierra Mcmullen is a 73 year old female  postmenopausal on vaginal estrogen replacement presents today for follow-up of recurring UTIs.  At present the patient is asymptomatic.  She completed a course of Macrobid suppression last winter.  I reviewed her most recent urinalysis and urine culture from 2018.  The culture was negative.  Again today we discussed the pathophysiology of recurring UTIs.  The patient has been elevating the anterior vaginal wall to ensure complete drainage, voiding more frequently and also using Purell wipes to cleanse the perineum and perianal area.    Past Medical History:   Diagnosis Date     Adenomatous polyp of colon 2011    needs conolonospy 2016     Hypothyroidism     Abstracted 02     Osteopenia       ROS: 10 point ROS neg other than the symptoms noted above in the HPI.  Past Surgical History:   Procedure Laterality Date     ARTHROPLASTY KNEE Left 2015    Procedure: ARTHROPLASTY KNEE;  Surgeon: Daniel Mack MD;  Location:  OR     C NONSPECIFIC PROCEDURE      Cholecystectomy -- Abstracted 02     CHOLECYSTECTOMY       COLONOSCOPY N/A 2016    Procedure: COLONOSCOPY;  Surgeon: Sage Berg MD;  Location:  GI     CYSTOSCOPY  2012    Procedure:CYSTOSCOPY; Surgeon:GAMA GRIDER; Location: OR     DAVINCI HYSTERECTOMY SUPRACERVICAL, SACROCOLPOPEXY, COMBINED  2012    Procedure:COMBINED DAVINCI HYSTERECTOMY SUPRACERVICAL, SACROCOLPOPEXY; DAVINCI LAPAROSCOPIC SUPRACERVICAL HYSTERECTOMY, BILATERAL SALPINGO-OOPHORECTOMY, SACROCOLPOPEXY  WITH COLOPLASTY MESH AND CYSTOPLASTY; Surgeon:GAMA GRIDER; Location: OR     HC COLONOSCOPY THRU STOMA, DIAGNOSTIC        REMOVAL OF TONSILS,<13 Y/O      Tonsils <12y.o.     HC TOOTH EXTRACTION W/FORCEP       Vital signs:      BP: 118/78               Weight: 185 lb 8 oz (84.1 kg)  Estimated body mass index is 29.94 kg/(m^2) as calculated from the following:    Height as of 18:  "5' 6\" (1.676 m).    Weight as of this encounter: 185 lb 8 oz (84.1 kg).    /78  Wt 185 lb 8 oz (84.1 kg)  LMP 07/22/1997  BMI 29.94 kg/m2  Constitutional: healthy, alert and no distress  (N39.0) Recurrent UTI  (primary encounter diagnosis)  Comment: Urinalysis today shows no evidence of concern.  We are going to check a repeat urinalysis and culture monthly region of the next 3 months and reassess.  Call as needed concerns in the interval.  A written plan was given and all her questions answered  Plan: UA reflex to Microscopic and Culture, *UA         reflex to Microscopic and Culture ([facility] (except [place] and         [place]), *UA reflex to Microscopic and Culture        ([facility] (except [place]        and [place]), *UA reflex to Microscopic and         Culture ([facility] (except         [place] and [place]), Urine Microscopic        15 min spent w > 50% in counseling         Xavier Austin MD FACOG  "

## 2018-05-09 NOTE — PROGRESS NOTES
I left the patient a voicemail message with the normal UA results.  We will proceed as per the plan and yesterday's office visit notes  Xavier Austin MD FACOG

## 2018-05-09 NOTE — TELEPHONE ENCOUNTER
I left the patient another voicemail message with the results of the urinalysis that were normal.  Please see the office visits notes from yesterday for the plan.  At this point were to get a repeat urine culture and urinalysis monthly for the next 3 months and reassess she will call should she have concerns in the interval  Xavier Austin MD FACOG

## 2018-05-09 NOTE — TELEPHONE ENCOUNTER
Patient calling. She said Dr. Austin called her about an hour ago and left her a voicemail, but she accidentally deleted it or cant find the message. Her urine does not look very suspicious for a UTI, but she is wondering if Dr. Austin can call her again to relay the message he left before. Please advise.      Stephanie Schwartz RN

## 2018-06-04 DIAGNOSIS — N39.0 RECURRENT UTI: ICD-10-CM

## 2018-06-04 PROCEDURE — 81001 URINALYSIS AUTO W/SCOPE: CPT | Performed by: OBSTETRICS & GYNECOLOGY

## 2018-06-05 NOTE — PROGRESS NOTES
Kierra, all your results are within normal limits.  I see no evidence of any bladder infection  Xavier Austin M.D.

## 2018-06-26 ENCOUNTER — TRANSFERRED RECORDS (OUTPATIENT)
Dept: HEALTH INFORMATION MANAGEMENT | Facility: CLINIC | Age: 73
End: 2018-06-26

## 2018-06-27 DIAGNOSIS — R82.90 NONSPECIFIC FINDING ON EXAMINATION OF URINE: Primary | ICD-10-CM

## 2018-06-27 DIAGNOSIS — N39.0 RECURRENT UTI: ICD-10-CM

## 2018-06-27 PROCEDURE — 87086 URINE CULTURE/COLONY COUNT: CPT | Performed by: OBSTETRICS & GYNECOLOGY

## 2018-06-27 PROCEDURE — 81001 URINALYSIS AUTO W/SCOPE: CPT | Performed by: OBSTETRICS & GYNECOLOGY

## 2018-06-27 NOTE — PROGRESS NOTES
Kierra, the urine analysis shows some white blood cells which may be a contaminant.  Lets wait to see what the culture shows  Xavier Austin M.D.

## 2018-06-28 ENCOUNTER — TELEPHONE (OUTPATIENT)
Dept: OBGYN | Facility: CLINIC | Age: 73
End: 2018-06-28

## 2018-06-28 DIAGNOSIS — R30.0 DYSURIA: Primary | ICD-10-CM

## 2018-06-28 LAB
BACTERIA SPEC CULT: NO GROWTH
SPECIMEN SOURCE: NORMAL

## 2018-06-28 RX ORDER — CIPROFLOXACIN 250 MG/1
250 TABLET, FILM COATED ORAL 2 TIMES DAILY
Qty: 6 TABLET | Refills: 0 | Status: SHIPPED | OUTPATIENT
Start: 2018-06-28 | End: 2019-04-17

## 2018-06-28 NOTE — TELEPHONE ENCOUNTER
I informed the patient of the negative urine culture result.  She denies any fevers chills or hematuria.  The patient is going to [place] for a week for a riverboat cruise and is asked that I would send a prescription for Cipro to the [facility] pharmacy on [facility] that she could take just in case she understands that she will not take this unless she absolutely has a concern.  Her fear is that she is in [place] and wants to be able to get treated if she needs to.  I told her uncomfortable with this and I did send a prescription to the pharmacy.  She will let me know if she has any further questions

## 2018-06-28 NOTE — TELEPHONE ENCOUNTER
Pt calls to give info regarding her recent UA.  We are awaiting culture currently.     She does have strong urine smell, which she has had in the past with uti. She is leaving for [place] on Tuesday and wanted to let you know that.  I did let her know culture should be back by tomorrow and Dr Austin is in office.    Please send tx, if needed, before she leaves on Tuesday.    Kiki MONTIEL R.N.  [facility]

## 2018-07-13 DIAGNOSIS — N39.0 RECURRENT UTI: ICD-10-CM

## 2018-07-13 NOTE — TELEPHONE ENCOUNTER
Estra/hrt b      Last Written Prescription Date:  3/8/18  Last Fill Quantity: 42 g,   # refills: 3  Last Office Visit: 5/8/18  Future Office visit:    Next 5 appointments (look out 90 days)     Jul 25, 2018  8:30 AM CDT   Nurse Only with [facility] IM NURSE   [facility] ([facility]    [address]  [facility] [address]   [phone]

## 2018-07-16 ENCOUNTER — TRANSFERRED RECORDS (OUTPATIENT)
Dept: HEALTH INFORMATION MANAGEMENT | Facility: CLINIC | Age: 73
End: 2018-07-16

## 2018-07-23 DIAGNOSIS — N39.0 RECURRENT UTI: ICD-10-CM

## 2018-07-23 DIAGNOSIS — R82.90 NONSPECIFIC FINDING ON EXAMINATION OF URINE: Primary | ICD-10-CM

## 2018-07-23 LAB
ALBUMIN UR-MCNC: NEGATIVE MG/DL
APPEARANCE UR: ABNORMAL
BACTERIA #/AREA URNS HPF: ABNORMAL /HPF
BILIRUB UR QL STRIP: NEGATIVE
COLOR UR AUTO: YELLOW
GLUCOSE UR STRIP-MCNC: NEGATIVE MG/DL
HGB UR QL STRIP: ABNORMAL
KETONES UR STRIP-MCNC: NEGATIVE MG/DL
LEUKOCYTE ESTERASE UR QL STRIP: ABNORMAL
NITRATE UR QL: NEGATIVE
NON-SQ EPI CELLS #/AREA URNS LPF: ABNORMAL /LPF
PH UR STRIP: 6 PH (ref 5–7)
RBC #/AREA URNS AUTO: ABNORMAL /HPF
SOURCE: ABNORMAL
SP GR UR STRIP: 1.01 (ref 1–1.03)
UROBILINOGEN UR STRIP-ACNC: 0.2 EU/DL (ref 0.2–1)
WBC #/AREA URNS AUTO: ABNORMAL /HPF

## 2018-07-23 PROCEDURE — 87086 URINE CULTURE/COLONY COUNT: CPT | Performed by: OBSTETRICS & GYNECOLOGY

## 2018-07-23 PROCEDURE — 81001 URINALYSIS AUTO W/SCOPE: CPT | Performed by: OBSTETRICS & GYNECOLOGY

## 2018-07-23 NOTE — PROGRESS NOTES
Kierra, the urine analysis shows some White blood cells which in some cases can suggest infection.  In the past, in your situation, it has been a vaginal contaminant.  I expect the culture results back tomorrow and will keep you posted  Xavier Austin M.D.

## 2018-07-24 ENCOUNTER — TELEPHONE (OUTPATIENT)
Dept: OBGYN | Facility: CLINIC | Age: 73
End: 2018-07-24

## 2018-07-24 DIAGNOSIS — N39.0 RECURRENT URINARY TRACT INFECTION: Primary | ICD-10-CM

## 2018-07-24 LAB
BACTERIA SPEC CULT: NO GROWTH
SPECIMEN SOURCE: NORMAL

## 2018-07-24 NOTE — PROGRESS NOTES
Kierra, as per our phone conversation urine culture results are negative and I suspect the white blood cells seen on urinalysis are a vaginal contaminant.  We discussed doing monthly routine urinalysis and cultures for the next several months and given your past history I think this is a reasonable thing to do I put orders in for the next 3 months.  Certainly if he had symptoms call and will get she went sooner  Thank you  Xavier Austin M.D.

## 2018-07-24 NOTE — TELEPHONE ENCOUNTER
I notified the patient of her negative UC results.  The patient brought up the topic of continuing random urinalysis urine cultures monthly for the next several months as a screen in light of her past history of recurring UTIs.  I think this is reasonable and I placed an order to do so  Xavier Austin MD FACOG

## 2018-07-25 ENCOUNTER — ALLIED HEALTH/NURSE VISIT (OUTPATIENT)
Dept: NURSING | Facility: CLINIC | Age: 73
End: 2018-07-25
Payer: COMMERCIAL

## 2018-07-25 DIAGNOSIS — Z23 NEED FOR SHINGLES VACCINE: Primary | ICD-10-CM

## 2018-07-25 PROCEDURE — 90750 HZV VACC RECOMBINANT IM: CPT

## 2018-07-25 PROCEDURE — 90471 IMMUNIZATION ADMIN: CPT

## 2018-07-25 PROCEDURE — 99207 ZZC NO CHARGE NURSE ONLY: CPT

## 2018-07-25 NOTE — MR AVS SNAPSHOT
After Visit Summary   7/25/2018    Kierra Mcmullen    MRN: [id number]           Patient Information     Date Of Birth          1945        Visit Information        Provider Department      7/25/2018 8:30 AM [facility] IM NURSE [facility]        Today's Diagnoses     Need for shingles vaccine    -  1       Follow-ups after your visit        Future tests that were ordered for you today     Open Future Orders        Priority Expected Expires Ordered    *UA reflex to Microscopic and Culture ([facility] (except [place] and [place]) Routine 8/24/2018 7/24/2019 7/24/2018            Who to contact     If you have questions or need follow up information about today's clinic visit or your schedule please contact [facility] directly at [phone].  Normal or non-critical lab and imaging results will be communicated to you by MyChart, letter or phone within 4 business days after the clinic has received the results. If you do not hear from us within 7 days, please contact the clinic through MyChart or phone. If you have a critical or abnormal lab result, we will notify you by phone as soon as possible.  Submit refill requests through [employer] or call your pharmacy and they will forward the refill request to us. Please allow 3 business days for your refill to be completed.          Additional Information About Your Visit        MyChart Information     [employer] gives you secure access to your electronic health record. If you see a primary care provider, you can also send messages to your care team and make appointments. If you have questions, please call your primary care clinic.  If you do not have a primary care provider, please call [phone] and they will assist you.        Care EveryWhere ID     This is your Care EveryWhere ID. This could be used by other organizations to access your [place] medical records  [id number]        Your Vitals Were     Last  Period                   07/22/1997            Blood Pressure from Last 3 Encounters:   05/08/18 118/78   04/13/18 110/70   04/09/18 120/72    Weight from Last 3 Encounters:   05/08/18 185 lb 8 oz (84.1 kg)   04/13/18 186 lb (84.4 kg)   04/09/18 186 lb (84.4 kg)              We Performed the Following     HC ZOSTER VACCINE RECOMBINANT ADJUVANTED IM NJX          Today's Medication Changes          These changes are accurate as of 7/25/18  9:15 AM.  If you have any questions, ask your nurse or doctor.               These medicines have changed or have updated prescriptions.        Dose/Directions    calcium + D 600-200 MG-UNIT Tabs   This may have changed:  See the new instructions.   Generic drug:  calcium carbonate-vitamin D        1 TABLET DAILY   Quantity:  30   Refills:  0                Primary Care Provider Office Phone # Fax #    Matt Whitney [phone]       [address] [facility] [address]        Equal Access to Services     [facility] : Hadii aad ku hadasho Soomaali, waaxda luqadaha, qaybta kaalmada adeegyada, waxay michelain haypamella michael . So [facility] [phone].    ATENCIÓN: Si habla español, tiene a maxwell disposición servicios gratuitos de asistencia lingüística. [facility] [phone].    We comply with applicable federal civil rights laws and [place] laws. We do not discriminate on the basis of race, color, national origin, age, disability, sex, sexual orientation, or gender identity.            Thank you!     Thank you for choosing [facility]  for your care. Our goal is always to provide you with excellent care. Hearing back from our patients is one way we can continue to improve our services. Please take a few minutes to complete the written survey that you may receive in the mail after your visit with us. Thank you!             Your Updated Medication List - Protect others around you: Learn how to safely use, store and throw away your  medicines at www.disposemymeds.org.          This list is accurate as of 18  9:15 AM.  Always use your most recent med list.                   Brand Name Dispense Instructions for use Diagnosis    Biotin 5000 MCG Caps           calcium + D 600-200 MG-UNIT Tabs   Generic drug:  calcium carbonate-vitamin D     30    1 TABLET DAILY        calcium polycarbophil 625 MG tablet    FIBERCON     Take 2 tablets by mouth daily        ciprofloxacin 250 MG tablet    CIPRO    6 tablet    Take 1 tablet (250 mg) by mouth 2 times daily    Dysuria       COMPOUNDED NON-CONTROLLED SUBSTANCE - PHARMACY TO MIX COMPOUNDED MEDICATION    CMPD RX    42 g    Estradiol 0.02% in HRT heavy (cream base)  Place in tube and include vaginal applicator Si/2 gm intravaginally at HS on Mon and Thurs pm as directed    Recurrent UTI       GLUCOSAMINE CHONDROITIN COMPLX PO      Take 1 tablet by mouth daily 750/600        levothyroxine 100 MCG tablet    SYNTHROID/LEVOTHROID    90 tablet    Take 1 tablet (100 mcg) by mouth daily    Routine general medical examination at a health care facility       MULTI-DAY Tabs      one PO QD

## 2018-07-25 NOTE — NURSING NOTE
Screening Questionnaire for Adult Immunization    Are you sick today?   No   Do you have allergies to medications, food, a vaccine component or latex?   No   Have you ever had a serious reaction after receiving a vaccination?   No   Do you have a long-term health problem with heart disease, lung disease, asthma, kidney disease, metabolic disease (e.g. diabetes), anemia, or other blood disorder?   No   Do you have cancer, leukemia, HIV/AIDS, or any other immune system problem?   No   In the past 3 months, have you taken medications that affect  your immune system, such as prednisone, other steroids, or anticancer drugs; drugs for the treatment of rheumatoid arthritis, Crohn s disease, or psoriasis; or have you had radiation treatments?   No   Have you had a seizure, or a brain or other nervous system problem?   No   During the past year, have you received a transfusion of blood or blood     products, or been given immune (gamma) globulin or antiviral drug?   No   For women: Are you pregnant or is there a chance you could become        pregnant during the next month?   No   Have you received any vaccinations in the past 4 weeks?   No     Immunization questionnaire answers were all negative.        Per orders of Dr. Whitney, injection of Shigrix given by Wanda Hernandez. Patient instructed to remain in clinic for 15 minutes afterwards, and to report any adverse reaction to me immediately.       Screening performed by Wanda Hernandez on 7/25/2018 at 9:27 AM.

## 2018-08-16 DIAGNOSIS — N39.0 RECURRENT URINARY TRACT INFECTION: ICD-10-CM

## 2018-08-16 PROCEDURE — 87086 URINE CULTURE/COLONY COUNT: CPT | Performed by: OBSTETRICS & GYNECOLOGY

## 2018-08-16 PROCEDURE — 81001 URINALYSIS AUTO W/SCOPE: CPT | Performed by: OBSTETRICS & GYNECOLOGY

## 2018-08-16 NOTE — PROGRESS NOTES
Kierra,  Your urine analysis results are within normal limits.  Let us see what the urine culture shows and we can make a decision if we need to do any therapies at that time.  I expect the culture back within a day or 2  Xavier Austin M.D.

## 2018-08-17 LAB
BACTERIA SPEC CULT: NO GROWTH
SPECIMEN SOURCE: NORMAL

## 2018-08-20 NOTE — PROGRESS NOTES
Kierra, your urine culture result is negative.  Please let me know if you have any questions otherwise we will continue with our earlier plan  Xavier Austni M.D.

## 2018-09-19 DIAGNOSIS — N39.0 RECURRENT URINARY TRACT INFECTION: ICD-10-CM

## 2018-09-19 PROCEDURE — 87086 URINE CULTURE/COLONY COUNT: CPT | Performed by: OBSTETRICS & GYNECOLOGY

## 2018-09-19 PROCEDURE — 81001 URINALYSIS AUTO W/SCOPE: CPT | Performed by: OBSTETRICS & GYNECOLOGY

## 2018-09-20 LAB
BACTERIA SPEC CULT: NO GROWTH
SPECIMEN SOURCE: NORMAL

## 2018-09-21 ENCOUNTER — TELEPHONE (OUTPATIENT)
Dept: OBGYN | Facility: CLINIC | Age: 73
End: 2018-09-21

## 2018-09-21 NOTE — TELEPHONE ENCOUNTER
Dr Austin,    Pt calling for results.  Looks like culture had no growth.    Kiki MONTIEL R.N.  [facility]

## 2018-09-21 NOTE — TELEPHONE ENCOUNTER
Reason for call:  Other   Patient called regarding (reason for call): call back  Additional comments: Patient calling in she has not heard back if she has a UTI or not. Had labs done on Wednesday. Please callback.    Phone number to reach patient:  Home number on file [phone] (home)    Best Time:  any    Can we leave a detailed message on this number?  NO

## 2018-09-22 NOTE — TELEPHONE ENCOUNTER
I spoke with the patient and gave her the results of the urine culture.  The urine culture result is negative.  She has 2 more random urine cultures ordered for the next 2 months.  If these continue to come back negative we will treat her expectantly.  She will continue with her present regimen    Xavier Austin M.D.

## 2018-11-01 DIAGNOSIS — N39.0 RECURRENT URINARY TRACT INFECTION: ICD-10-CM

## 2018-11-01 PROCEDURE — 87086 URINE CULTURE/COLONY COUNT: CPT | Performed by: OBSTETRICS & GYNECOLOGY

## 2018-11-01 PROCEDURE — 81001 URINALYSIS AUTO W/SCOPE: CPT | Performed by: OBSTETRICS & GYNECOLOGY

## 2018-11-02 LAB
BACTERIA SPEC CULT: NORMAL
SPECIMEN SOURCE: NORMAL

## 2018-11-09 NOTE — PROGRESS NOTES
The patient had a normal KUB film in May 2017 and a normal cystoscopy in July 2017 with Dr. Connell.  No stones diverticula or signs of malignancy were seen.  I believe the present microhematuria is benign asymptomatic hematuria with a negative workup.  I am going  to send her urine for culture with appropriate therapy to follow.  The patient has a history of recurring UTIs and I will treat based on the urine culture results.  Please inform the patient    Xavier Austin MD FACOG No

## 2018-12-03 DIAGNOSIS — N39.0 RECURRENT URINARY TRACT INFECTION: ICD-10-CM

## 2018-12-03 PROCEDURE — 81001 URINALYSIS AUTO W/SCOPE: CPT | Performed by: OBSTETRICS & GYNECOLOGY

## 2018-12-03 PROCEDURE — 87086 URINE CULTURE/COLONY COUNT: CPT | Performed by: OBSTETRICS & GYNECOLOGY

## 2018-12-04 ENCOUNTER — TELEPHONE (OUTPATIENT)
Dept: OBGYN | Facility: CLINIC | Age: 73
End: 2018-12-04

## 2018-12-04 DIAGNOSIS — Z00.00 ROUTINE GENERAL MEDICAL EXAMINATION AT A HEALTH CARE FACILITY: ICD-10-CM

## 2018-12-04 DIAGNOSIS — N39.0 URINARY TRACT INFECTION: ICD-10-CM

## 2018-12-04 DIAGNOSIS — Z87.440 H/O RECURRENT URINARY TRACT INFECTION: Primary | ICD-10-CM

## 2018-12-04 LAB
BACTERIA SPEC CULT: NORMAL
SPECIMEN SOURCE: NORMAL

## 2018-12-04 RX ORDER — LEVOTHYROXINE SODIUM 100 UG/1
100 TABLET ORAL DAILY
Qty: 90 TABLET | Refills: 3 | Status: SHIPPED | OUTPATIENT
Start: 2018-12-04 | End: 2019-04-17

## 2018-12-04 NOTE — TELEPHONE ENCOUNTER
I spoke with the patient and relayed the results of the negative urine culture.  She is otherwise asymptomatic and no additional therapy will be undertaken at this time.  The patient and her  are going to [place] for the winter.  She would like to have a written prescription for a urine culture on a monthly basis that she can have done in [place].  The patient has a history of recurring UTIs which of late have been doing very well.  Please mail the patient a prescription for monthly urine cultures for 3 months    Thank you  Xavier Austin MD FACOG

## 2018-12-28 DIAGNOSIS — Z87.440 H/O RECURRENT URINARY TRACT INFECTION: ICD-10-CM

## 2018-12-28 DIAGNOSIS — N39.0 URINARY TRACT INFECTION: ICD-10-CM

## 2018-12-28 PROCEDURE — 87086 URINE CULTURE/COLONY COUNT: CPT | Performed by: OBSTETRICS & GYNECOLOGY

## 2018-12-29 LAB
BACTERIA SPEC CULT: NORMAL
SPECIMEN SOURCE: NORMAL

## 2019-01-22 ENCOUNTER — TELEPHONE (OUTPATIENT)
Dept: OBGYN | Facility: CLINIC | Age: 74
End: 2019-01-22

## 2019-01-22 ENCOUNTER — MYC MEDICAL ADVICE (OUTPATIENT)
Dept: OBGYN | Facility: CLINIC | Age: 74
End: 2019-01-22

## 2019-01-22 NOTE — TELEPHONE ENCOUNTER
Pt has not been treated for the uti.  She would like if possible.  Pharmacy verified.    Also would like results explained and also wants to know if she need recheck after taking meds.    Kiki MONTIEL R.N.  [facility]

## 2019-01-22 NOTE — TELEPHONE ENCOUNTER
I left the pt a VM message on her cell phone ([phone]) regarding a UC that she had done in [place] that is positive for Citrobacter.  I wanted to make sure the UTI was treated  Please try the pt later   Xavier Austin M.D.   (Routing comment)

## 2019-01-23 ENCOUNTER — MYC MEDICAL ADVICE (OUTPATIENT)
Dept: OBGYN | Facility: CLINIC | Age: 74
End: 2019-01-23

## 2019-01-23 NOTE — TELEPHONE ENCOUNTER
Pt calling again. Routed to MD on call as this was not addressed yesterday. Please see attached results and send rx for UTI.      Stephanie Schwartz RN

## 2019-01-23 NOTE — TELEPHONE ENCOUNTER
Also see the [employer] message thread regarding this. Pt is awaiting an antibiotic from our providers.        Stephanie Schwartz RN

## 2019-02-12 ENCOUNTER — TRANSFERRED RECORDS (OUTPATIENT)
Dept: HEALTH INFORMATION MANAGEMENT | Facility: CLINIC | Age: 74
End: 2019-02-12

## 2019-02-18 ENCOUNTER — TELEPHONE (OUTPATIENT)
Dept: OBGYN | Facility: CLINIC | Age: 74
End: 2019-02-18

## 2019-02-18 NOTE — TELEPHONE ENCOUNTER
Patient calling:  Had monthly urine test done in [place] on 2/12/19  Results were faxed today. (Mike has them)   I have entered pharmacy.  Alberta Johnson RN

## 2019-02-19 NOTE — TELEPHONE ENCOUNTER
I spoke with the patient today regarding the urine culture that she had done February 12, 2019 at [facility] in [facility].  The culture returned showing 100,000 colonies of lactobacillus.  The patient is asymptomatic.  I reviewed with her no therapy is needed at this time.  She will see me when she returns to [place] in April

## 2019-04-15 ASSESSMENT — ENCOUNTER SYMPTOMS
CHILLS: 0
WEAKNESS: 0
CONSTIPATION: 0
JOINT SWELLING: 0
PARESTHESIAS: 0
DIARRHEA: 0
ABDOMINAL PAIN: 0
MYALGIAS: 0
ARTHRALGIAS: 1
SHORTNESS OF BREATH: 0
DYSURIA: 0
NERVOUS/ANXIOUS: 0
COUGH: 0
PALPITATIONS: 0
FREQUENCY: 0
HEMATOCHEZIA: 0
DIZZINESS: 0
FEVER: 0
EYE PAIN: 0
SORE THROAT: 0
HEARTBURN: 0
NAUSEA: 0
HEADACHES: 0
HEMATURIA: 0
BREAST MASS: 0

## 2019-04-15 ASSESSMENT — ACTIVITIES OF DAILY LIVING (ADL): CURRENT_FUNCTION: NO ASSISTANCE NEEDED

## 2019-04-17 ENCOUNTER — HOSPITAL ENCOUNTER (OUTPATIENT)
Dept: MAMMOGRAPHY | Facility: CLINIC | Age: 74
Discharge: HOME | End: 2019-04-17
Attending: INTERNAL MEDICINE | Admitting: INTERNAL MEDICINE
Payer: COMMERCIAL

## 2019-04-17 ENCOUNTER — OFFICE VISIT (OUTPATIENT)
Dept: INTERNAL MEDICINE | Facility: CLINIC | Age: 74
End: 2019-04-17
Payer: COMMERCIAL

## 2019-04-17 VITALS
HEART RATE: 71 BPM | BODY MASS INDEX: 30.41 KG/M2 | SYSTOLIC BLOOD PRESSURE: 120 MMHG | TEMPERATURE: 98.2 F | OXYGEN SATURATION: 96 % | DIASTOLIC BLOOD PRESSURE: 76 MMHG | WEIGHT: 189.19 LBS | RESPIRATION RATE: 12 BRPM | HEIGHT: 66 IN

## 2019-04-17 DIAGNOSIS — Z00.00 ROUTINE GENERAL MEDICAL EXAMINATION AT A HEALTH CARE FACILITY: Primary | ICD-10-CM

## 2019-04-17 DIAGNOSIS — Z78.0 MENOPAUSE: ICD-10-CM

## 2019-04-17 DIAGNOSIS — Z12.39 BREAST CANCER SCREENING: ICD-10-CM

## 2019-04-17 DIAGNOSIS — R82.90 ABNORMAL FINDING IN URINE: ICD-10-CM

## 2019-04-17 DIAGNOSIS — N39.0 RECURRENT UTI: ICD-10-CM

## 2019-04-17 DIAGNOSIS — E03.9 HYPOTHYROIDISM, UNSPECIFIED TYPE: ICD-10-CM

## 2019-04-17 LAB
ALBUMIN SERPL-MCNC: 3.6 G/DL (ref 3.4–5)
ALBUMIN UR-MCNC: NEGATIVE MG/DL
ALP SERPL-CCNC: 66 U/L (ref 40–150)
ALT SERPL W P-5'-P-CCNC: 22 U/L (ref 0–50)
ANION GAP SERPL CALCULATED.3IONS-SCNC: 4 MMOL/L (ref 3–14)
APPEARANCE UR: ABNORMAL
AST SERPL W P-5'-P-CCNC: 16 U/L (ref 0–45)
BACTERIA #/AREA URNS HPF: ABNORMAL /HPF
BILIRUB SERPL-MCNC: 0.6 MG/DL (ref 0.2–1.3)
BILIRUB UR QL STRIP: NEGATIVE
BUN SERPL-MCNC: 18 MG/DL (ref 7–30)
CALCIUM SERPL-MCNC: 9.3 MG/DL (ref 8.5–10.1)
CHLORIDE SERPL-SCNC: 105 MMOL/L (ref 94–109)
CHOLEST SERPL-MCNC: 180 MG/DL
CO2 SERPL-SCNC: 30 MMOL/L (ref 20–32)
COLOR UR AUTO: YELLOW
CREAT SERPL-MCNC: 0.92 MG/DL (ref 0.52–1.04)
ERYTHROCYTE [DISTWIDTH] IN BLOOD BY AUTOMATED COUNT: 13.8 % (ref 10–15)
GFR SERPL CREATININE-BSD FRML MDRD: 61 ML/MIN/{1.73_M2}
GLUCOSE SERPL-MCNC: 95 MG/DL (ref 70–99)
GLUCOSE UR STRIP-MCNC: NEGATIVE MG/DL
HCT VFR BLD AUTO: 44.9 % (ref 35–47)
HDLC SERPL-MCNC: 64 MG/DL
HGB BLD-MCNC: 14.8 G/DL (ref 11.7–15.7)
HGB UR QL STRIP: ABNORMAL
KETONES UR STRIP-MCNC: NEGATIVE MG/DL
LDLC SERPL CALC-MCNC: 97 MG/DL
LEUKOCYTE ESTERASE UR QL STRIP: ABNORMAL
MCH RBC QN AUTO: 29.9 PG (ref 26.5–33)
MCHC RBC AUTO-ENTMCNC: 33 G/DL (ref 31.5–36.5)
MCV RBC AUTO: 91 FL (ref 78–100)
NITRATE UR QL: NEGATIVE
NON-SQ EPI CELLS #/AREA URNS LPF: ABNORMAL /LPF
NONHDLC SERPL-MCNC: 116 MG/DL
PH UR STRIP: 6 PH (ref 5–7)
PLATELET # BLD AUTO: 251 10E9/L (ref 150–450)
POTASSIUM SERPL-SCNC: 3.9 MMOL/L (ref 3.4–5.3)
PROT SERPL-MCNC: 7.5 G/DL (ref 6.8–8.8)
RBC # BLD AUTO: 4.95 10E12/L (ref 3.8–5.2)
RBC #/AREA URNS AUTO: ABNORMAL /HPF
SODIUM SERPL-SCNC: 139 MMOL/L (ref 133–144)
SOURCE: ABNORMAL
SP GR UR STRIP: 1.01 (ref 1–1.03)
TRIGL SERPL-MCNC: 96 MG/DL
TSH SERPL DL<=0.005 MIU/L-ACNC: 0.99 MU/L (ref 0.4–4)
UROBILINOGEN UR STRIP-ACNC: 0.2 EU/DL (ref 0.2–1)
WBC # BLD AUTO: 5.7 10E9/L (ref 4–11)
WBC #/AREA URNS AUTO: >100 /HPF

## 2019-04-17 PROCEDURE — G0439 PPPS, SUBSEQ VISIT: HCPCS | Performed by: INTERNAL MEDICINE

## 2019-04-17 PROCEDURE — 80061 LIPID PANEL: CPT | Performed by: INTERNAL MEDICINE

## 2019-04-17 PROCEDURE — 81001 URINALYSIS AUTO W/SCOPE: CPT | Performed by: INTERNAL MEDICINE

## 2019-04-17 PROCEDURE — 85027 COMPLETE CBC AUTOMATED: CPT | Performed by: INTERNAL MEDICINE

## 2019-04-17 PROCEDURE — 36415 COLL VENOUS BLD VENIPUNCTURE: CPT | Performed by: INTERNAL MEDICINE

## 2019-04-17 PROCEDURE — 80053 COMPREHEN METABOLIC PANEL: CPT | Performed by: INTERNAL MEDICINE

## 2019-04-17 PROCEDURE — 87086 URINE CULTURE/COLONY COUNT: CPT | Performed by: INTERNAL MEDICINE

## 2019-04-17 PROCEDURE — 77063 BREAST TOMOSYNTHESIS BI: CPT

## 2019-04-17 PROCEDURE — 84443 ASSAY THYROID STIM HORMONE: CPT | Performed by: INTERNAL MEDICINE

## 2019-04-17 RX ORDER — LEVOTHYROXINE SODIUM 100 UG/1
100 TABLET ORAL DAILY
Qty: 90 TABLET | Refills: 3 | Status: SHIPPED | OUTPATIENT
Start: 2019-04-17 | End: 2019-12-16

## 2019-04-17 ASSESSMENT — ENCOUNTER SYMPTOMS
DIARRHEA: 0
SHORTNESS OF BREATH: 0
WEAKNESS: 0
HEMATOCHEZIA: 0
CHILLS: 0
EYE PAIN: 0
DIZZINESS: 0
PALPITATIONS: 0
JOINT SWELLING: 0
COUGH: 0
BREAST MASS: 0
HEADACHES: 0
ABDOMINAL PAIN: 0
HEMATURIA: 0
SORE THROAT: 0
DYSURIA: 0
NERVOUS/ANXIOUS: 0
FREQUENCY: 0
MYALGIAS: 0
HEARTBURN: 0
FEVER: 0
PARESTHESIAS: 0
CONSTIPATION: 0
NAUSEA: 0
ARTHRALGIAS: 1

## 2019-04-17 ASSESSMENT — MIFFLIN-ST. JEOR: SCORE: 1374.9

## 2019-04-17 ASSESSMENT — ACTIVITIES OF DAILY LIVING (ADL): CURRENT_FUNCTION: NO ASSISTANCE NEEDED

## 2019-04-17 NOTE — PROGRESS NOTES
"SUBJECTIVE:   Kierra Mcmullen is a 74 year old female who presents for Preventive Visit.      Are you in the first 12 months of your Medicare coverage?  No    Healthy Habits:     In general, how would you rate your overall health?  Good    Frequency of exercise:  4-5 days/week    Duration of exercise:  15-30 minutes    Do you usually eat at least 4 servings of fruit and vegetables a day, include whole grains    & fiber and avoid regularly eating high fat or \"junk\" foods?  Yes    Taking medications regularly:  Yes    Medication side effects:  Not applicable    Ability to successfully perform activities of daily living:  No assistance needed    Home Safety:  No safety concerns identified    Hearing Impairment:  Need to ask people to speak up or repeat themselves    In the past 6 months, have you been bothered by leaking of urine? Yes    In general, how would you rate your overall mental or emotional health?  Excellent      PHQ-2 Total Score: 0    Additional concerns today:  No    Do you feel safe in your environment? Yes    Do you have a Health Care Directive? Yes: Advance Directive has been received and scanned.      Fall risk  Fallen 2 or more times in the past year?: No  Any fall with injury in the past year?: No    Cognitive Screening   1) Repeat 3 items (Leader, Season, Table)    2) Clock draw: NORMAL  3) 3 item recall: Recalls 3 objects  Results: 3 items recalled: COGNITIVE IMPAIRMENT LESS LIKELY    Mini-CogTM Copyright ANOOP Olivares. Licensed by the author for use in [facility]; reprinted with permission (mariana@.[facility]). All rights reserved.      Do you have sleep apnea, excessive snoring or daytime drowsiness?: no    Reviewed and updated as needed this visit by clinical staff  Meds         Reviewed and updated as needed this visit by Provider        Social History     Tobacco Use     Smoking status: Former Smoker     Packs/day: 1.00     Years: 25.00     Pack years: 25.00     Types: Cigarettes     Last " attempt to quit: 1987     Years since quittin.3     Smokeless tobacco: Never Used     Tobacco comment:    Substance Use Topics     Alcohol use: Yes     Alcohol/week: 6.0 oz     Comment: 2-3 glasses of wine per wk     If you drink alcohol do you typically have >3 drinks per day or >7 drinks per week? No    Alcohol Use 4/15/2019   Prescreen: >3 drinks/day or >7 drinks/week? No   Prescreen: >3 drinks/day or >7 drinks/week? -           PROBLEMS TO ADD ON...  Has history of hypothyroidism. On replacement treatment with Synthroid. No heat /cold intolerance, heart palpitations, weight loss/ gain ,  change in bowel habits.      Current providers sharing in care for this patient include:   Patient Care Team:  Matt Whitney MD as PCP - General (Internal Medicine)  Matt Whitney MD as Assigned PCP    The following health maintenance items are reviewed in Epic and correct as of today:  Health Maintenance   Topic Date Due     PHQ-2  2019     TSH Q1 YEAR  2019     MEDICARE ANNUAL WELLNESS VISIT  2019     FALL RISK ASSESSMENT  2019     MAMMO Q1 YR  2019     COLONOSCOPY Q5 YR  2021     ADVANCE DIRECTIVE PLANNING Q5 YRS  2022     DTAP/TDAP/TD IMMUNIZATION (3 - Td) 10/01/2022     LIPID SCREEN Q5 YR FEMALE (SYSTEM ASSIGNED)  2023     DEXA SCAN SCREENING (SYSTEM ASSIGNED)  Completed     INFLUENZA VACCINE  Completed     ZOSTER IMMUNIZATION  Completed     HEPATITIS C SCREENING  Addressed     IPV IMMUNIZATION  Aged Out     MENINGITIS IMMUNIZATION  Aged Out     Labs reviewed in EPIC  BP Readings from Last 3 Encounters:   19 120/76   18 118/78   18 110/70    Wt Readings from Last 3 Encounters:   19 85.8 kg (189 lb 3 oz)   18 84.1 kg (185 lb 8 oz)   18 84.4 kg (186 lb)                      Review of Systems   Constitutional: Negative for chills and fever.   HENT: Negative for congestion, ear pain, hearing loss and sore throat.   "  Eyes: Negative for pain and visual disturbance.   Respiratory: Negative for cough and shortness of breath.    Cardiovascular: Negative for chest pain, palpitations and peripheral edema.   Gastrointestinal: Negative for abdominal pain, constipation, diarrhea, heartburn, hematochezia and nausea.   Breasts:  Negative for tenderness, breast mass and discharge.   Genitourinary: Positive for urgency and vaginal discharge. Negative for dysuria, frequency, genital sores, hematuria, pelvic pain and vaginal bleeding.   Musculoskeletal: Positive for arthralgias. Negative for joint swelling and myalgias.   Skin: Negative for rash.   Neurological: Negative for dizziness, weakness, headaches and paresthesias.   Psychiatric/Behavioral: Negative for mood changes. The patient is not nervous/anxious.          OBJECTIVE:   LMP 07/22/1997  Estimated body mass index is 29.94 kg/m  as calculated from the following:    Height as of 4/13/18: 1.676 m (5' 6\").    Weight as of 5/8/18: 84.1 kg (185 lb 8 oz).  Physical Exam  GENERAL: healthy, alert and no distress  EYES: Eyes grossly normal to inspection, PERRL and conjunctivae and sclerae normal  HENT: ear canals and TM's normal, nose and mouth without ulcers or lesions  NECK: no adenopathy, no asymmetry, masses, or scars and thyroid normal to palpation  RESP: lungs clear to auscultation - no rales, rhonchi or wheezes  CV: regular rate and rhythm, normal S1 S2, no S3 or S4, no murmur, click or rub, no peripheral edema and peripheral pulses strong  ABDOMEN: soft, nontender, no hepatosplenomegaly, no masses and bowel sounds normal  MS: no gross musculoskeletal defects noted, no edema  SKIN: no suspicious lesions or rashes  NEURO: Normal strength and tone, mentation intact and speech normal  PSYCH: mentation appears normal, affect normal/bright    Diagnostic Test Results:  none     ASSESSMENT / PLAN:       ICD-10-CM    1. Routine general medical examination at a health care facility Z00.00 " "levothyroxine (SYNTHROID/LEVOTHROID) 100 MCG tablet     CBC with platelets     Comprehensive metabolic panel     Lipid panel reflex to direct LDL Fasting     TSH with free T4 reflex     *UA reflex to Microscopic and Culture ([facility] (except [place] and [place])   2. Recurrent UTI N39.0 *UA reflex to Microscopic and Culture ([facility] (except [place] and [place])   3. Hypothyroidism, unspecified type E03.9 TSH with free T4 reflex   4. Menopause Z78.0 DX Hip/Pelvis/Spine       End of Life Planning:  Patient currently has an advanced directive: Yes.  Practitioner is supportive of decision.    COUNSELING:  Reviewed preventive health counseling, as reflected in patient instructions       Regular exercise       Healthy diet/nutrition       Vision screening       Hearing screening       Dental care       Colon cancer screening    BP Readings from Last 1 Encounters:   05/08/18 118/78     Estimated body mass index is 29.94 kg/m  as calculated from the following:    Height as of 4/13/18: 1.676 m (5' 6\").    Weight as of 5/8/18: 84.1 kg (185 lb 8 oz).      Weight management plan: Discussed healthy diet and exercise guidelines     reports that she quit smoking about 32 years ago. Her smoking use included cigarettes. She has a 25.00 pack-year smoking history. She has never used smokeless tobacco.      Appropriate preventive services were discussed with this patient, including applicable screening as appropriate for cardiovascular disease, diabetes, osteopenia/osteoporosis, and glaucoma.  As appropriate for age/gender, discussed screening for colorectal cancer, prostate cancer, breast cancer, and cervical cancer. Checklist reviewing preventive services available has been given to the patient.    Reviewed patients plan of care and provided an AVS. The Intermediate Care Plan ( asthma action plan, low back pain action plan, and migraine action plan) for Kierra meets the Care Plan " requirement. This Care Plan has been established and reviewed with the Patient.    Counseling Resources:  ATP IV Guidelines  Pooled Cohorts Equation Calculator  Breast Cancer Risk Calculator  FRAX Risk Assessment  ICSI Preventive Guidelines  Dietary Guidelines for Americans, 2010  USDA's MyPlate  ASA Prophylaxis  Lung CA Screening    Matt Whitney MD  [facility]    Identified Health Risks:

## 2019-04-18 LAB
BACTERIA SPEC CULT: NORMAL
SPECIMEN SOURCE: NORMAL

## 2019-04-24 ENCOUNTER — TRANSFERRED RECORDS (OUTPATIENT)
Dept: HEALTH INFORMATION MANAGEMENT | Facility: CLINIC | Age: 74
End: 2019-04-24

## 2019-04-24 DIAGNOSIS — R30.0 DYSURIA: ICD-10-CM

## 2019-04-24 LAB
ALBUMIN UR-MCNC: NEGATIVE MG/DL
APPEARANCE UR: CLEAR
BACTERIA #/AREA URNS HPF: ABNORMAL /HPF
BILIRUB UR QL STRIP: NEGATIVE
COLOR UR AUTO: YELLOW
GLUCOSE UR STRIP-MCNC: NEGATIVE MG/DL
HGB UR QL STRIP: ABNORMAL
KETONES UR STRIP-MCNC: NEGATIVE MG/DL
LEUKOCYTE ESTERASE UR QL STRIP: ABNORMAL
MUCOUS THREADS #/AREA URNS LPF: PRESENT /LPF
NITRATE UR QL: NEGATIVE
NON-SQ EPI CELLS #/AREA URNS LPF: ABNORMAL /LPF
PH UR STRIP: 7 PH (ref 5–7)
RBC #/AREA URNS AUTO: ABNORMAL /HPF
SOURCE: ABNORMAL
SP GR UR STRIP: 1.01 (ref 1–1.03)
UROBILINOGEN UR STRIP-ACNC: 0.2 EU/DL (ref 0.2–1)
WBC #/AREA URNS AUTO: ABNORMAL /HPF
WBC CLUMPS #/AREA URNS HPF: PRESENT /HPF

## 2019-04-24 PROCEDURE — 81001 URINALYSIS AUTO W/SCOPE: CPT | Performed by: OBSTETRICS & GYNECOLOGY

## 2019-04-24 PROCEDURE — 87086 URINE CULTURE/COLONY COUNT: CPT | Performed by: OBSTETRICS & GYNECOLOGY

## 2019-04-24 NOTE — RESULT ENCOUNTER NOTE
Kierra, the urinalysis results show pyuria, white blood cells in your urine in addition to a small amount of red blood cells with squamous epithelial cells moderate bacteria and mucus.  I suspect this represents a vaginal contaminant and not a true urinary tract infection.  Let us wait for the results of the culture to return and I can make any treatment recommendations at that time.  Please let me know if you have any questions  Xavier Austin M.D.

## 2019-04-25 LAB
BACTERIA SPEC CULT: NORMAL
SPECIMEN SOURCE: NORMAL

## 2019-04-25 NOTE — RESULT ENCOUNTER NOTE
Kierra, all your results are within normal limits,  I do NOT see any evidence of a urinary tract infection  Xavier Austin M.D.

## 2019-05-06 ENCOUNTER — OFFICE VISIT (OUTPATIENT)
Dept: OBGYN | Facility: CLINIC | Age: 74
End: 2019-05-06
Payer: COMMERCIAL

## 2019-05-06 VITALS — SYSTOLIC BLOOD PRESSURE: 130 MMHG | DIASTOLIC BLOOD PRESSURE: 74 MMHG | BODY MASS INDEX: 30.83 KG/M2 | WEIGHT: 191 LBS

## 2019-05-06 DIAGNOSIS — N81.11 CYSTOCELE, MIDLINE: ICD-10-CM

## 2019-05-06 DIAGNOSIS — N39.0 RECURRENT UTI: Primary | ICD-10-CM

## 2019-05-06 DIAGNOSIS — R30.0 DYSURIA: ICD-10-CM

## 2019-05-06 LAB
ALBUMIN UR-MCNC: NEGATIVE MG/DL
APPEARANCE UR: CLEAR
BACTERIA #/AREA URNS HPF: ABNORMAL /HPF
BILIRUB UR QL STRIP: NEGATIVE
COLOR UR AUTO: YELLOW
GLUCOSE UR STRIP-MCNC: NEGATIVE MG/DL
HGB UR QL STRIP: ABNORMAL
KETONES UR STRIP-MCNC: NEGATIVE MG/DL
LEUKOCYTE ESTERASE UR QL STRIP: ABNORMAL
NITRATE UR QL: NEGATIVE
NON-SQ EPI CELLS #/AREA URNS LPF: ABNORMAL /LPF
PH UR STRIP: 6 PH (ref 5–7)
RBC #/AREA URNS AUTO: ABNORMAL /HPF
SOURCE: ABNORMAL
SP GR UR STRIP: 1.01 (ref 1–1.03)
UROBILINOGEN UR STRIP-ACNC: 0.2 EU/DL (ref 0.2–1)
WBC #/AREA URNS AUTO: ABNORMAL /HPF

## 2019-05-06 PROCEDURE — 81001 URINALYSIS AUTO W/SCOPE: CPT | Performed by: OBSTETRICS & GYNECOLOGY

## 2019-05-06 PROCEDURE — 99213 OFFICE O/P EST LOW 20 MIN: CPT | Performed by: OBSTETRICS & GYNECOLOGY

## 2019-05-06 PROCEDURE — 87086 URINE CULTURE/COLONY COUNT: CPT | Performed by: OBSTETRICS & GYNECOLOGY

## 2019-05-06 NOTE — PATIENT INSTRUCTIONS
You can reach your [facility] Care Team any time of the day by calling [phone]. This number will put you in touch with the 24 hour nurse line if the clinic is closed.    To contact your OB/GYN Station Coordinator/Surgery Scheduler please call [phone]. This is a direct number for your care team between 8 a.m. and 4 p.m. Monday through Friday.    [facility] is open for your convenience:  Monday through Friday 8 a.m. to 6 p.m.  Closed weekends and all major holidays.     Hatchet Flap Text: The defect edges were debeveled with a #15 scalpel blade.  Given the location of the defect, shape of the defect and the proximity to free margins a hatchet flap was deemed most appropriate.  Using a sterile surgical marker, an appropriate hatchet flap was drawn incorporating the defect and placing the expected incisions within the relaxed skin tension lines where possible.    The area thus outlined was incised deep to adipose tissue with a #15 scalpel blade.  The skin margins were undermined to an appropriate distance in all directions utilizing iris scissors.

## 2019-05-06 NOTE — NURSING NOTE
"Chief Complaint   Patient presents with     Results       Initial /74   Wt 86.6 kg (191 lb)   LMP 1997   BMI 30.83 kg/m   Estimated body mass index is 30.83 kg/m  as calculated from the following:    Height as of 19: 1.676 m (5' 6\").    Weight as of this encounter: 86.6 kg (191 lb).  BP completed using cuff size: regular    Questioned patient about current smoking habits.  Pt. has never smoked.          The following HM Due: NONE      The following patient reported/Care Every where data was sent to:  P ABSTRACT QUALITY INITIATIVES [[id number]]        Yolande Lynn [facility]                 "

## 2019-05-06 NOTE — PROGRESS NOTES
HPI:  Kierra Mcmullen is a 74 year old female  Patient's last menstrual period was 1997. postmenopausal for contraception, who presents for evaluation of recurrent UTI and a cystocele.  I rev at length her random testing for UTI, all of which have shown vag contamination.  She feels an occasional bulge from the introitus and would like it eval today.  We discussed adequate emptying of her bladder and techniques that she can use    Past Medical History:   Diagnosis Date     Adenomatous polyp of colon 2011    needs conolonospy 2016     Hypothyroidism     Abstracted 02     Osteopenia      Past Surgical History:   Procedure Laterality Date     ARTHROPLASTY KNEE Left 2015    Procedure: ARTHROPLASTY KNEE;  Surgeon: Daniel Mack MD;  Location:  OR     C NONSPECIFIC PROCEDURE      Cholecystectomy -- Abstracted 02     CHOLECYSTECTOMY       COLONOSCOPY N/A 2016    Procedure: COLONOSCOPY;  Surgeon: Sage Berg MD;  Location:  GI     CYSTOSCOPY  2012    Procedure:CYSTOSCOPY; Surgeon:GAMA GRIDER; Location:[facility] OR     DAVINCI HYSTERECTOMY SUPRACERVICAL, SACROCOLPOPEXY, COMBINED  2012    Procedure:COMBINED DAVINCI HYSTERECTOMY SUPRACERVICAL, SACROCOLPOPEXY; DAVINCI LAPAROSCOPIC SUPRACERVICAL HYSTERECTOMY, BILATERAL SALPINGO-OOPHORECTOMY, SACROCOLPOPEXY  WITH COLOPLASTY MESH AND CYSTOPLASTY; Surgeon:GAMA GRIDER; Location: OR     HC COLONOSCOPY THRU STOMA, DIAGNOSTIC       HC REMOVAL OF TONSILS,<13 Y/O      Tonsils <12y.o.     HC TOOTH EXTRACTION W/FORCEP       Family History   Problem Relation Age of Onset     Alzheimer Disease Father      Cancer Mother          non-Hodgkins lymphoma     Osteoporosis Mother      Arthritis Mother         RA     Gastrointestinal Disease Mother         UC     Osteoporosis Sister      Gastrointestinal Disease Sister         diverticulitis     Cardiovascular Maternal Aunt         MI     Breast Cancer Paternal Aunt       Social History     Socioeconomic History     Marital status:      Spouse name: Sage     Number of children: 1     Years of education: 18     Highest education level: Not on file   Occupational History     Occupation: Teacher     Employer: [employer]   Social Needs     Financial resource strain: Not on file     Food insecurity:     Worry: Not on file     Inability: Not on file     Transportation needs:     Medical: Not on file     Non-medical: Not on file   Tobacco Use     Smoking status: Former Smoker     Packs/day: 1.00     Years: 25.00     Pack years: 25.00     Types: Cigarettes     Last attempt to quit: 1987     Years since quittin.3     Smokeless tobacco: Never Used     Tobacco comment:    Substance and Sexual Activity     Alcohol use: Yes     Alcohol/week: 6.0 oz     Comment: 2-3 glasses of wine per wk     Drug use: No     Sexual activity: Yes     Partners: Male     Birth control/protection: Surgical     Comment: vas   Lifestyle     Physical activity:     Days per week: Not on file     Minutes per session: Not on file     Stress: Not on file   Relationships     Social connections:     Talks on phone: Not on file     Gets together: Not on file     Attends Confucianist service: Not on file     Active member of club or organization: Not on file     Attends meetings of clubs or organizations: Not on file     Relationship status: Not on file     Intimate partner violence:     Fear of current or ex partner: Not on file     Emotionally abused: Not on file     Physically abused: Not on file     Forced sexual activity: Not on file   Other Topics Concern      Service Not Asked     Blood Transfusions Not Asked     Caffeine Concern No     Occupational Exposure No     Hobby Hazards No     Sleep Concern No     Stress Concern No     Weight Concern No     Special Diet No     Back Care No     Exercise Yes     Comment: walk     Bike Helmet Not Asked     Seat Belt Yes      Self-Exams Not Asked     Parent/sibling w/ CABG, MI or angioplasty before 65F 55M? Not Asked   Social History Narrative     Not on file       Allergies:  Sulfa drugs    Current Outpatient Medications   Medication Sig Dispense Refill     Biotin 5000 MCG CAPS        CALCIUM + D 600-200 MG-IU OR TABS 1 TABLET DAILY (Patient taking differently: 2 TABLETS DAILY) 30 0     calcium polycarbophil (FIBERCON) 625 MG tablet Take 2 tablets by mouth daily       COMPOUNDED NON-CONTROLLED SUBSTANCE (CMPD RX) - PHARMACY TO MIX COMPOUNDED MEDICATION Estradiol 0.02% in HRT heavy (cream base)  Place in tube and include vaginal applicator  Si/2 gm intravaginally at HS on Mon and Thurs pm as directed 42 g 3     GLUCOSAMINE CHONDROITIN COMPLX PO Take 1 tablet by mouth daily 750/600        levothyroxine (SYNTHROID/LEVOTHROID) 100 MCG tablet Take 1 tablet (100 mcg) by mouth daily 90 tablet 3     MULTI-DAY OR TABS one PO QD         Review Of Systems   ROS: 10 point ROS neg other than the symptoms noted above in the HPI.    Exam:  /74   Wt 86.6 kg (191 lb)   LMP 1997   BMI 30.83 kg/m    {Constitutional: healthy, alert and no distress  Genitourinary: Normal external genitalia without lesions and the pt had a gr 3 cystocele with bilateral paravaginal defects that extend to just proximal to the introitus, gr 1 apical decent  No lesions  RV normal sphincter tone gr 1 min distal rectocele    Assessment/Plan:  (N39.0) Recurrent UTI  (primary encounter diagnosis)  Comment: pt requests a random UMAC and UC if pos in 3 months o/w she will call prn concerns  Plan: *UA reflex to Microscopic and Culture ([place]         [facility] (except [place] and         [place]), Urine Microscopic, CANCELED: Urine         Culture Aerobic Bacterial        Done ordered       (R30.0) Dysuria  Comment: resolved  Plan: as above    (N81.11) Cystocele, midline  Comment: The findings of pelvic floor relaxation including Cystocele were  discussed at length as were the risks, benefits, and alternative modes of treatment.  We discussed a conservative wait and see approach with techniques to ensure adequate emptying of her bladder, the use of a pessary conventional repairs, the use of biologics, and mesh augmentation, the success rates, issues of erosion, the FDA bulletin on the use of mesh for pelvic floor disorders, post op pain, dysparunea, voiding or defacatory dysfunction as well as injury to other organs, bleeding or infection.   I think the patient has a good understanding. She has been given patient education information, all of her questions have been answered and informed consent obtained.    Plan: pt desires wait and see approach  If concerns arise she may consider a pessary at a future date  She will let us know             Xavier Austin M.D.

## 2019-05-07 LAB
BACTERIA SPEC CULT: NORMAL
SPECIMEN SOURCE: NORMAL

## 2019-05-07 NOTE — RESULT ENCOUNTER NOTE
Kierra, all your results are within normal limits.  The results represent a vaginal contaminant and NOT a UTI  Xavier Austin M.D.

## 2019-05-23 ENCOUNTER — OFFICE VISIT (OUTPATIENT)
Dept: INTERNAL MEDICINE | Facility: CLINIC | Age: 74
End: 2019-05-23
Payer: COMMERCIAL

## 2019-05-23 VITALS
BODY MASS INDEX: 30.53 KG/M2 | RESPIRATION RATE: 14 BRPM | WEIGHT: 190 LBS | SYSTOLIC BLOOD PRESSURE: 104 MMHG | TEMPERATURE: 96.1 F | HEART RATE: 79 BPM | OXYGEN SATURATION: 99 % | HEIGHT: 66 IN | DIASTOLIC BLOOD PRESSURE: 64 MMHG

## 2019-05-23 DIAGNOSIS — R30.0 DYSURIA: ICD-10-CM

## 2019-05-23 DIAGNOSIS — B34.9 VIRAL SYNDROME: Primary | ICD-10-CM

## 2019-05-23 LAB
ALBUMIN UR-MCNC: NEGATIVE MG/DL
APPEARANCE UR: CLEAR
BACTERIA #/AREA URNS HPF: ABNORMAL /HPF
BILIRUB UR QL STRIP: NEGATIVE
COLOR UR AUTO: YELLOW
GLUCOSE UR STRIP-MCNC: NEGATIVE MG/DL
HGB UR QL STRIP: ABNORMAL
KETONES UR STRIP-MCNC: NEGATIVE MG/DL
LEUKOCYTE ESTERASE UR QL STRIP: ABNORMAL
NITRATE UR QL: NEGATIVE
NON-SQ EPI CELLS #/AREA URNS LPF: ABNORMAL /LPF
PH UR STRIP: 5.5 PH (ref 5–7)
RBC #/AREA URNS AUTO: ABNORMAL /HPF
SOURCE: ABNORMAL
SP GR UR STRIP: <=1.005 (ref 1–1.03)
UROBILINOGEN UR STRIP-ACNC: 0.2 EU/DL (ref 0.2–1)
WBC #/AREA URNS AUTO: ABNORMAL /HPF

## 2019-05-23 PROCEDURE — 87088 URINE BACTERIA CULTURE: CPT | Performed by: FAMILY MEDICINE

## 2019-05-23 PROCEDURE — 87086 URINE CULTURE/COLONY COUNT: CPT | Performed by: FAMILY MEDICINE

## 2019-05-23 PROCEDURE — 99213 OFFICE O/P EST LOW 20 MIN: CPT | Performed by: FAMILY MEDICINE

## 2019-05-23 PROCEDURE — 87186 SC STD MICRODIL/AGAR DIL: CPT | Performed by: FAMILY MEDICINE

## 2019-05-23 PROCEDURE — 81001 URINALYSIS AUTO W/SCOPE: CPT | Performed by: FAMILY MEDICINE

## 2019-05-23 ASSESSMENT — MIFFLIN-ST. JEOR: SCORE: 1378.58

## 2019-05-23 NOTE — PROGRESS NOTES
"  CHIEF COMPLAINT    Tired  Decr appetite      HISTORY    She has a 4 day h/o tiredness, decr appetite and slight nausea. May be feeling slightly better today. No other suggestive SX.    Has h/o bladder problems.      Patient Active Problem List   Diagnosis     Obesity     Prolapse of vaginal wall     Recurrent UTI     Advance Care Planning     Postmenopausal bleeding     Cystocele     Enterocele     Hypothyroidism     Osteopenia     Adenomatous polyp of colon     Degenerative arthritis of knee     Current Outpatient Medications   Medication Sig Dispense Refill     Biotin 5000 MCG CAPS        CALCIUM + D 600-200 MG-IU OR TABS 1 TABLET DAILY (Patient taking differently: 2 TABLETS DAILY) 30 0     calcium polycarbophil (FIBERCON) 625 MG tablet Take 2 tablets by mouth daily       COMPOUNDED NON-CONTROLLED SUBSTANCE (CMPD RX) - PHARMACY TO MIX COMPOUNDED MEDICATION Estradiol 0.02% in HRT heavy (cream base)  Place in tube and include vaginal applicator  Si/2 gm intravaginally at HS on Mon and Thurs pm as directed 42 g 3     GLUCOSAMINE CHONDROITIN COMPLX PO Take 1 tablet by mouth daily 750/600        levothyroxine (SYNTHROID/LEVOTHROID) 100 MCG tablet Take 1 tablet (100 mcg) by mouth daily 90 tablet 3     MULTI-DAY OR TABS one PO QD         REVIEW OF SYSTEMS    No fever/chills  No ST or head congestion  No cough  No abd pain, no V or D  No dysuria  No rash      Past Medical History:   Diagnosis Date     Adenomatous polyp of colon 2011    needs conolonospy 2016     Hypothyroidism     Abstracted 02     Osteopenia        EXAM  /64 (BP Location: Right arm, Patient Position: Sitting, Cuff Size: Adult Large)   Pulse 79   Temp 96.1  F (35.6  C) (Oral)   Resp 14   Ht 1.676 m (5' 6\")   Wt 86.2 kg (190 lb)   LMP 1997   SpO2 99%   BMI 30.67 kg/m      Appears well in general  HEENT neg  Neck neg  Chest cl  Abd non tender  Skin neg      Results for orders placed or performed in visit on 19   UA " reflex to Microscopic and Culture   Result Value Ref [facility]    Color Urine Yellow     Appearance Urine Clear     Glucose Urine Negative NEG^Negative mg/dL    Bilirubin Urine Negative NEG^Negative    Ketones Urine Negative NEG^Negative mg/dL    Specific Gravity Urine <=1.005 1.003 - 1.035    Blood Urine Small (A) NEG^Negative    pH Urine 5.5 5.0 - 7.0 pH    Protein Albumin Urine Negative NEG^Negative mg/dL    Urobilinogen Urine 0.2 0.2 - 1.0 EU/dL    Nitrite Urine Negative NEG^Negative    Leukocyte Esterase Urine Large (A) NEG^Negative    Source Midstream Urine    Urine Microscopic   Result Value Ref [facility]    WBC Urine 10-25 (A) OTO5^0 - 5 /HPF    RBC Urine 2-5 (A) OTO2^O - 2 /HPF    Squamous Epithelial /LPF Urine Moderate (A) FEW^Few /LPF    Bacteria Urine Moderate (A) NEG^Negative /HPF       (B34.9) Viral syndrome  (primary encounter diagnosis)  Comment:   Likely viral illness  Plan:   Symptomatic.  Observe.  Patient advised to return for worsening or persistent symptoms.      (R30.0) Dysuria  Comment:   Doesn't really have this SX.  R/O UTI  Plan: UA reflex to Microscopic and Culture, Urine         Microscopic, Urine Culture Aerobic Bacterial

## 2019-05-25 LAB
BACTERIA SPEC CULT: ABNORMAL
SPECIMEN SOURCE: ABNORMAL

## 2019-05-28 ENCOUNTER — TELEPHONE (OUTPATIENT)
Dept: INTERNAL MEDICINE | Facility: CLINIC | Age: 74
End: 2019-05-28

## 2019-05-28 DIAGNOSIS — N30.00 ACUTE CYSTITIS WITHOUT HEMATURIA: Primary | ICD-10-CM

## 2019-05-28 RX ORDER — CEPHALEXIN 500 MG/1
500 CAPSULE ORAL 2 TIMES DAILY
Qty: 14 CAPSULE | Refills: 0 | Status: SHIPPED | OUTPATIENT
Start: 2019-05-28 | End: 2019-06-04

## 2019-05-28 NOTE — TELEPHONE ENCOUNTER
Reason for Call:  Request for results:    Name of test or procedure: UA    Date of test of procedure: 5/23/19    Location of the test or procedure: [facility]    OK to leave the result message on voice mail or with a family member? YES    Phone number Patient can be reached at:  Cell number on file:    Telephone Information:   Mobile [phone]       Additional comments: Please call patient back regarding getting antibiotic for UTI. She usually is prescribed Cipro and can't take Microbid. Please send Rx to [facility] in [place].    Call taken on 5/28/2019 at 11:41 AM by Marylou Wallace

## 2019-06-19 ENCOUNTER — ANCILLARY PROCEDURE (OUTPATIENT)
Dept: BONE DENSITY | Facility: CLINIC | Age: 74
End: 2019-06-19
Payer: COMMERCIAL

## 2019-06-19 DIAGNOSIS — N30.00 ACUTE CYSTITIS WITHOUT HEMATURIA: ICD-10-CM

## 2019-06-19 DIAGNOSIS — Z78.0 MENOPAUSE: ICD-10-CM

## 2019-06-19 PROCEDURE — 77085 DXA BONE DENSITY AXL VRT FX: CPT | Performed by: INTERNAL MEDICINE

## 2019-06-19 PROCEDURE — 87086 URINE CULTURE/COLONY COUNT: CPT | Performed by: FAMILY MEDICINE

## 2019-06-20 LAB
BACTERIA SPEC CULT: NORMAL
BACTERIA SPEC CULT: NORMAL
SPECIMEN SOURCE: NORMAL

## 2019-07-01 ENCOUNTER — OFFICE VISIT (OUTPATIENT)
Dept: OBGYN | Facility: CLINIC | Age: 74
End: 2019-07-01
Payer: COMMERCIAL

## 2019-07-01 VITALS — WEIGHT: 186 LBS | DIASTOLIC BLOOD PRESSURE: 64 MMHG | SYSTOLIC BLOOD PRESSURE: 108 MMHG | BODY MASS INDEX: 30.02 KG/M2

## 2019-07-01 DIAGNOSIS — N39.0 RECURRENT UTI: Primary | ICD-10-CM

## 2019-07-01 PROCEDURE — 99213 OFFICE O/P EST LOW 20 MIN: CPT | Performed by: OBSTETRICS & GYNECOLOGY

## 2019-07-01 RX ORDER — CIPROFLOXACIN 250 MG/1
250 TABLET, FILM COATED ORAL 2 TIMES DAILY
Qty: 6 TABLET | Refills: 0 | Status: SHIPPED | OUTPATIENT
Start: 2019-07-01 | End: 2019-10-14

## 2019-07-01 NOTE — PATIENT INSTRUCTIONS
You can reach your [facility] Care Team any time of the day by calling [phone]. This number will put you in touch with the 24 hour nurse line if the clinic is closed.    To contact your OB/GYN Station Coordinator/Surgery Scheduler please call [phone]. This is a direct number for your care team between 8 a.m. and 4 p.m. Monday through Friday.    [facility] is open for your convenience:  Monday through Friday 8 a.m. to 6 p.m.  Closed weekends and all major holidays.

## 2019-07-02 NOTE — PROGRESS NOTES
Kierra Mcmullen is a 74 year old female  postmenopausal status post robotic supracervical hysterectomy, BSO and sacral colpopexy uses occasional vaginal estrogen for menopausal symptoms who in the past has had a history of recurrent UTIs who presents today for follow-up.  The patient has had serial monthly urine analysis checked and they have all been negative until most recently on 2019 and the patient had an E. coli UTI.  The patient was treated with Keflex and her symptoms resolved.  A follow-up test of cure urine culture on 2019 was negative.  The patient is presently asymptomatic and presents with her  today to discuss treatment alternatives.  We had a lengthy discussion regarding UTIs recurrent UTIs asymptomatic bacteriuria and the risk benefits and alternative forms of therapy.  We discussed that her random urine specimens and cultures have been negative.  The patient is asking if random urine analysis may be helpful in light of her past history    Past Medical History:   Diagnosis Date     Adenomatous polyp of colon 2011    needs conolonospy 2016     Hypothyroidism     Abstracted 02     Osteopenia       ROS: 10 point ROS neg other than the symptoms noted above in the HPI.  /64   Wt 84.4 kg (186 lb)   LMP 1997   BMI 30.02 kg/m    Constitutional: healthy, alert and no distress    (N39.0) Recurrent UTI  (primary encounter diagnosis)  Comment: Doing well.  After lengthy discussion we decided that it may be helpful to do a random urine analysis and culture every 3 to 4 months to ensure resolution of this concern.  The patient is planning a cruise to [place] and is asking for a prophylactic course of antibiotics that she could take just in case.  We had a lengthy discussion regarding antibiotic use and I think she has a good understanding of this  Plan: *UA reflex to Microscopic and Culture ([place]         and [facility] (except [place] and          [place]), ciprofloxacin (CIPRO) 250 MG tablet        I did give her a prescription for Cipro 250 mg p.o. twice daily for 3 days.  We discussed risks to quinolones including but not limited to tendon rupture.  She understands accepts.  Written plan was given  Return PRN.  15 min spent w > 50% in counseling

## 2019-07-23 DIAGNOSIS — N39.0 RECURRENT UTI: ICD-10-CM

## 2019-07-23 NOTE — TELEPHONE ENCOUNTER
[facility]      Last Written Prescription Date:  7/13/18  Last Fill Quantity: 42 g,   # refills: 3  Last Office Visit: 7/1/19  Future Office visit:

## 2019-09-04 DIAGNOSIS — R82.90 NONSPECIFIC FINDING ON EXAMINATION OF URINE: Primary | ICD-10-CM

## 2019-09-04 DIAGNOSIS — N39.0 RECURRENT UTI: ICD-10-CM

## 2019-09-04 LAB
ALBUMIN UR-MCNC: NEGATIVE MG/DL
APPEARANCE UR: CLEAR
BACTERIA #/AREA URNS HPF: ABNORMAL /HPF
BILIRUB UR QL STRIP: NEGATIVE
COLOR UR AUTO: YELLOW
GLUCOSE UR STRIP-MCNC: NEGATIVE MG/DL
HGB UR QL STRIP: NEGATIVE
KETONES UR STRIP-MCNC: NEGATIVE MG/DL
LEUKOCYTE ESTERASE UR QL STRIP: ABNORMAL
NITRATE UR QL: NEGATIVE
NON-SQ EPI CELLS #/AREA URNS LPF: ABNORMAL /LPF
PH UR STRIP: 6.5 PH (ref 5–7)
RBC #/AREA URNS AUTO: ABNORMAL /HPF
SOURCE: ABNORMAL
SP GR UR STRIP: 1.01 (ref 1–1.03)
UROBILINOGEN UR STRIP-ACNC: 0.2 EU/DL (ref 0.2–1)
WBC #/AREA URNS AUTO: ABNORMAL /HPF

## 2019-09-04 PROCEDURE — 81001 URINALYSIS AUTO W/SCOPE: CPT | Performed by: OBSTETRICS & GYNECOLOGY

## 2019-09-04 PROCEDURE — 87086 URINE CULTURE/COLONY COUNT: CPT | Performed by: OBSTETRICS & GYNECOLOGY

## 2019-09-04 NOTE — RESULT ENCOUNTER NOTE
Please notify the patient that we will await the culture results with appropriate therapy to follow  Xavier Austin MD FACOG

## 2019-09-05 LAB
BACTERIA SPEC CULT: NORMAL
SPECIMEN SOURCE: NORMAL

## 2019-09-05 NOTE — RESULT ENCOUNTER NOTE
I will await the final urine culture result to determine appropriate therapy.  Please notify the patient  Xavier Austin MD FACOG

## 2019-09-06 NOTE — RESULT ENCOUNTER NOTE
Kierra, your urine culture results are negative.  I left you a VM message with as well.  When I saw you last we discussed that in light of your history of recurrent urinary tract infections that  doing random urine cultures every 3-4 months for a period of time may be helpful.  Please let me know if you have any questions  Xavier Austin M.D.

## 2019-10-01 ENCOUNTER — HEALTH MAINTENANCE LETTER (OUTPATIENT)
Age: 74
End: 2019-10-01

## 2019-10-14 ENCOUNTER — OFFICE VISIT (OUTPATIENT)
Dept: INTERNAL MEDICINE | Facility: CLINIC | Age: 74
End: 2019-10-14
Payer: COMMERCIAL

## 2019-10-14 VITALS
HEIGHT: 66 IN | TEMPERATURE: 97.9 F | WEIGHT: 183 LBS | BODY MASS INDEX: 29.41 KG/M2 | HEART RATE: 94 BPM | DIASTOLIC BLOOD PRESSURE: 60 MMHG | SYSTOLIC BLOOD PRESSURE: 114 MMHG | OXYGEN SATURATION: 94 % | RESPIRATION RATE: 16 BRPM

## 2019-10-14 DIAGNOSIS — J02.9 ACUTE PHARYNGITIS, UNSPECIFIED ETIOLOGY: Primary | ICD-10-CM

## 2019-10-14 PROCEDURE — 99213 OFFICE O/P EST LOW 20 MIN: CPT | Performed by: INTERNAL MEDICINE

## 2019-10-14 ASSESSMENT — MIFFLIN-ST. JEOR: SCORE: 1346.83

## 2019-10-14 NOTE — NURSING NOTE
"Vital signs:  Temp: 97.9  F (36.6  C) Temp src: Oral BP: 114/60 Pulse: 94   Resp: 16 SpO2: 94 %     Height: 167.6 cm (5' 6\") Weight: 83 kg (183 lb)  Estimated body mass index is 29.54 kg/m  as calculated from the following:    Height as of this encounter: 1.676 m (5' 6\").    Weight as of this encounter: 83 kg (183 lb).          "

## 2019-10-15 ENCOUNTER — TELEPHONE (OUTPATIENT)
Dept: INTERNAL MEDICINE | Facility: CLINIC | Age: 74
End: 2019-10-15

## 2019-10-15 DIAGNOSIS — J06.9 UPPER RESPIRATORY TRACT INFECTION, UNSPECIFIED TYPE: Primary | ICD-10-CM

## 2019-10-15 RX ORDER — AZITHROMYCIN 250 MG/1
TABLET, FILM COATED ORAL
Qty: 6 TABLET | Refills: 0 | Status: SHIPPED | OUTPATIENT
Start: 2019-10-15 | End: 2019-10-20

## 2019-10-15 NOTE — TELEPHONE ENCOUNTER
Patient calls stating that she was seen yesterday and it was decided not to give RX for her yes.  Patient states her right eye was matted shut and left mild.  Patient states she is coughing and has green discharge.  Patient is now requesting RX be sent to pharmacy.  Please advise.  Please call cell phone.  Ok to leave a detailed message.

## 2019-10-15 NOTE — TELEPHONE ENCOUNTER
See message below. Still having eye symptoms.     She is asking about prescription for eyes. Allergy to sulfa.

## 2019-10-24 ENCOUNTER — TRANSFERRED RECORDS (OUTPATIENT)
Dept: HEALTH INFORMATION MANAGEMENT | Facility: CLINIC | Age: 74
End: 2019-10-24

## 2019-10-29 ENCOUNTER — ANCILLARY PROCEDURE (OUTPATIENT)
Dept: GENERAL RADIOLOGY | Facility: CLINIC | Age: 74
End: 2019-10-29
Attending: INTERNAL MEDICINE
Payer: COMMERCIAL

## 2019-10-29 DIAGNOSIS — R05.9 COUGH: Primary | ICD-10-CM

## 2019-10-29 DIAGNOSIS — R05.9 COUGH: ICD-10-CM

## 2019-10-29 PROCEDURE — 71046 X-RAY EXAM CHEST 2 VIEWS: CPT

## 2019-11-11 DIAGNOSIS — N39.0 URINARY TRACT INFECTION: ICD-10-CM

## 2019-11-11 DIAGNOSIS — Z87.440 H/O RECURRENT URINARY TRACT INFECTION: ICD-10-CM

## 2019-11-11 PROCEDURE — 87088 URINE BACTERIA CULTURE: CPT | Performed by: INTERNAL MEDICINE

## 2019-11-11 PROCEDURE — 87186 SC STD MICRODIL/AGAR DIL: CPT | Performed by: INTERNAL MEDICINE

## 2019-11-11 PROCEDURE — 87086 URINE CULTURE/COLONY COUNT: CPT | Performed by: OBSTETRICS & GYNECOLOGY

## 2019-11-12 ENCOUNTER — TELEPHONE (OUTPATIENT)
Dept: OBGYN | Facility: CLINIC | Age: 74
End: 2019-11-12

## 2019-11-12 DIAGNOSIS — N39.0 RECURRENT UTI: Primary | ICD-10-CM

## 2019-11-12 NOTE — TELEPHONE ENCOUNTER
Preliminary results are in for UC.  Pt called to inquire.    Kiki MONTIEL R.N.  [facility]

## 2019-11-13 RX ORDER — NITROFURANTOIN 25; 75 MG/1; MG/1
100 CAPSULE ORAL 2 TIMES DAILY
Qty: 14 CAPSULE | Refills: 0 | Status: SHIPPED | OUTPATIENT
Start: 2019-11-13 | End: 2020-06-29

## 2019-11-13 NOTE — TELEPHONE ENCOUNTER
The culture and sensitivities indicate the organism is sensitive to both macrobid and cipro so either would be acceptable therapy.     Dr. Gustafson    Routing comment

## 2019-11-13 NOTE — TELEPHONE ENCOUNTER
Chart and lab results reviewed.  Script for Macrobid faxed to pharmacy.  Please notify patient.     Dr Gustafson    Routing comment

## 2019-11-13 NOTE — TELEPHONE ENCOUNTER
Left detailed VM on pt's phone with this information (consent to communicate).      Stephanie Schwartz RN

## 2019-11-13 NOTE — TELEPHONE ENCOUNTER
Pt calling again for results. Please see results and send in rx if appropriate. Also routed to MD on call as Dr Austin is off today.        Stephanie Schwartz RN

## 2019-11-13 NOTE — TELEPHONE ENCOUNTER
Pt calling back. She says Macrobid never works for her and wonders if she can take cipro instead. She says she has two separate prescriptions at home for cipro BID for 3 days and wonders if she can take one or both of these prescriptions as this rx tends to work better for her. Please advise.        Stephanie Schwartz RN

## 2019-11-14 ENCOUNTER — TELEPHONE (OUTPATIENT)
Dept: OBGYN | Facility: CLINIC | Age: 74
End: 2019-11-14

## 2019-11-14 DIAGNOSIS — N39.0 RECURRENT UTI: Primary | ICD-10-CM

## 2019-11-14 DIAGNOSIS — N30.00 ACUTE CYSTITIS WITHOUT HEMATURIA: Primary | ICD-10-CM

## 2019-11-14 LAB
BACTERIA SPEC CULT: ABNORMAL
SPECIMEN SOURCE: ABNORMAL

## 2019-11-14 RX ORDER — NITROFURANTOIN 25; 75 MG/1; MG/1
100 CAPSULE ORAL 2 TIMES DAILY
Qty: 14 CAPSULE | Refills: 0 | Status: SHIPPED | OUTPATIENT
Start: 2019-11-14 | End: 2019-11-21

## 2019-11-14 NOTE — TELEPHONE ENCOUNTER
I agree with Dr. Wei's note.  Please asked the patient to take Cipro 250 mg p.o. twice daily for 3 days and then do a follow-up urine culture in 2 weeks to document resolution.  Please inform the patient of the plan.  If she needs additional antibiotic please ask her to let us know    Xavier Austin MD FACOG

## 2019-11-19 ENCOUNTER — TELEPHONE (OUTPATIENT)
Dept: OBGYN | Facility: CLINIC | Age: 74
End: 2019-11-19

## 2019-11-22 DIAGNOSIS — N39.0 RECURRENT UTI: ICD-10-CM

## 2019-11-22 PROCEDURE — 87086 URINE CULTURE/COLONY COUNT: CPT | Performed by: OBSTETRICS & GYNECOLOGY

## 2019-11-23 LAB
BACTERIA SPEC CULT: NO GROWTH
SPECIMEN SOURCE: NORMAL

## 2019-12-13 ENCOUNTER — TELEPHONE (OUTPATIENT)
Dept: OBGYN | Facility: CLINIC | Age: 74
End: 2019-12-13

## 2019-12-13 DIAGNOSIS — Z87.440 PERSONAL HISTORY OF URINARY TRACT INFECTION: Primary | ICD-10-CM

## 2019-12-13 DIAGNOSIS — N30.00 ACUTE CYSTITIS WITHOUT HEMATURIA: ICD-10-CM

## 2019-12-13 NOTE — TELEPHONE ENCOUNTER
Pt calling. She would like an order for a urine culture as she gets UTIs very often. She has an appt on Monday with Dr Austin to follow up. Not having any symptoms currently, but would like to be sure there is nothing coming on. Please advise if okay to place order.    Call pt back on home phone. Okay to leave a message.          Stephanie Schwartz RN

## 2019-12-13 NOTE — TELEPHONE ENCOUNTER
Lab ordered. Left detailed VM that an order was placed per pt request. She is to call and schedule a lab appt.        Stephanie Schwartz RN

## 2019-12-14 DIAGNOSIS — Z87.440 PERSONAL HISTORY OF URINARY TRACT INFECTION: ICD-10-CM

## 2019-12-14 DIAGNOSIS — N30.00 ACUTE CYSTITIS WITHOUT HEMATURIA: ICD-10-CM

## 2019-12-14 PROCEDURE — 87086 URINE CULTURE/COLONY COUNT: CPT | Performed by: OBSTETRICS & GYNECOLOGY

## 2019-12-16 ENCOUNTER — OFFICE VISIT (OUTPATIENT)
Dept: OBGYN | Facility: CLINIC | Age: 74
End: 2019-12-16
Payer: COMMERCIAL

## 2019-12-16 VITALS — BODY MASS INDEX: 29.7 KG/M2 | DIASTOLIC BLOOD PRESSURE: 72 MMHG | SYSTOLIC BLOOD PRESSURE: 110 MMHG | WEIGHT: 184 LBS

## 2019-12-16 DIAGNOSIS — E07.9 THYROID DYSFUNCTION: ICD-10-CM

## 2019-12-16 DIAGNOSIS — N39.0 RECURRENT UTI: ICD-10-CM

## 2019-12-16 DIAGNOSIS — Z87.440 PERSONAL HISTORY OF URINARY TRACT INFECTION: Primary | ICD-10-CM

## 2019-12-16 LAB
BACTERIA SPEC CULT: NO GROWTH
SPECIMEN SOURCE: NORMAL

## 2019-12-16 PROCEDURE — 99213 OFFICE O/P EST LOW 20 MIN: CPT | Performed by: OBSTETRICS & GYNECOLOGY

## 2019-12-16 RX ORDER — LEVOTHYROXINE SODIUM 100 UG/1
100 TABLET ORAL DAILY
Qty: 90 TABLET | Refills: 0 | Status: SHIPPED | OUTPATIENT
Start: 2019-12-16 | End: 2020-08-04

## 2019-12-16 NOTE — NURSING NOTE
"Chief Complaint   Patient presents with     Urinary Problem       Initial /72   Wt 83.5 kg (184 lb)   LMP 1997   BMI 29.70 kg/m   Estimated body mass index is 29.7 kg/m  as calculated from the following:    Height as of 10/14/19: 1.676 m (5' 6\").    Weight as of this encounter: 83.5 kg (184 lb).  BP completed using cuff size: regular    Questioned patient about current smoking habits.  Pt. has never smoked.          The following HM Due: NONE      The following patient reported/Care Every where data was sent to:  P ABSTRACT QUALITY INITIATIVES [[id number]]             Yolande Lynn [facility]            "

## 2019-12-16 NOTE — PATIENT INSTRUCTIONS
You can reach your [facility] Care Team any time of the day by calling [phone]. This number will put you in touch with the 24 hour nurse line if the clinic is closed.    To contact your OB/GYN Station Coordinator/Surgery Scheduler please call [phone]. This is a direct number for your care team between 8 a.m. and 4 p.m. Monday through Friday.    [facility] is open for your convenience:  Monday through Friday 8 a.m. to 6 p.m.  Closed weekends and all major holidays.

## 2019-12-16 NOTE — PROGRESS NOTES
HPI:  Kierra Mcmullen is a 74 year old white female  Patient's last menstrual period was 1997.  Postmenopausal status post da Erica robotic supracervical hysterectomy BSO sacral colpopexy contraception, uses occasional vaginal estrogen if symptomatic who presents for follow-up of history of recurrent UTIs.  The patient had a Klebsiella UTI 2019 and an E. coli UTI 2019.  The patient states that her typical symptoms is malodorous urine.  She has had multiple other random urine analysis and urine cultures which have all been negative.  At present she is symptom-free.  Patient and her  go to [place] for the winter and are also going on a cruise.  The patient asked if she could have a prescription for antibiotics as it is difficult to get into the doctors when she is in [place].  I discussed the importance of getting a urine culture if she is symptomatic so that we have an organism to treat.  He is also asking for refill on her thyroid medication as she will be gone until 2020.  The patient will call and let me know if she does require to use the antibiotic prescription.  In the past she states that Macrobid has not provided her adequate relief and that Cipro has worked well.  I discussed the issue with quinolones and tendon rupture and albeit a small risk preference to use an alternative antibiotic if possible.  The patient is amenable to the use of a cephalosporin.  Previous infections have been sensitive to this class of medications.  The patient had used a sulfa eyedrop for a pinkeye a number of years ago and noticed some increased redness and swelling and hence stated that she probably has an allergy to sulfa.  No other systemic rash.  I did discuss having her tested with allergy to document presence of a true allergy and concerns of antimicrobial resistance.  I did leave the patient a prescription for urine analysis urine culture should she have symptoms.  The patient had a  mammogram with breast tomosynthesis April 17, 2019 that was normal.  She also had a normal fasting lipid panel and a normal TSH 4/17/2019.  She had a complete physical exam with Dr. Chelo jung at that time.    Past Medical History:   Diagnosis Date     Adenomatous polyp of colon 5/2011    needs conolonospy 5/2016     Hypothyroidism     Abstracted 7/16/02     Osteopenia       ROS: 10 point ROS neg other than the symptoms noted above in the HPI.  Current Outpatient Medications   Medication     Biotin 5000 MCG CAPS     CALCIUM + D 600-200 MG-IU OR TABS     calcium polycarbophil (FIBERCON) 625 MG tablet     cephALEXin (KEFLEX) 250 MG capsule     COMPOUNDED NON-CONTROLLED SUBSTANCE (CMPD RX) - PHARMACY TO MIX COMPOUNDED MEDICATION     GLUCOSAMINE CHONDROITIN COMPLX PO     levothyroxine (SYNTHROID/LEVOTHROID) 100 MCG tablet     MULTI-DAY OR TABS     nitroFURantoin macrocrystal-monohydrate (MACROBID) 100 MG capsule     No current facility-administered medications for this visit.      Past Surgical History:   Procedure Laterality Date     ARTHROPLASTY KNEE Left 5/19/2015    Procedure: ARTHROPLASTY KNEE;  Surgeon: Daniel Mack MD;  Location:  OR      NONSPECIFIC PROCEDURE      Cholecystectomy -- Abstracted 7/16/02     CHOLECYSTECTOMY       COLONOSCOPY N/A 5/5/2016    Procedure: COLONOSCOPY;  Surgeon: Sage Berg MD;  Location:  GI     CYSTOSCOPY  1/31/2012    Procedure:CYSTOSCOPY; Surgeon:GAMA GRIDER; Location: OR     DAVINCI HYSTERECTOMY SUPRACERVICAL, SACROCOLPOPEXY, COMBINED  1/31/2012    Procedure:COMBINED DAVINCI HYSTERECTOMY SUPRACERVICAL, SACROCOLPOPEXY; DAVINCI LAPAROSCOPIC SUPRACERVICAL HYSTERECTOMY, BILATERAL SALPINGO-OOPHORECTOMY, SACROCOLPOPEXY  WITH COLOPLASTY MESH AND CYSTOPLASTY; Surgeon:GAMA GRIDER; Location: OR      COLONOSCOPY THRU STOMA, DIAGNOSTIC  2006     HC REMOVAL OF TONSILS,<13 Y/O      Tonsils <12y.o.     HC TOOTH EXTRACTION W/FORCEP       /72   Wt 83.5  kg (184 lb)   LMP 1997   BMI 29.70 kg/m    Constitutional: healthy, alert and no distress      Past Medical History:   Diagnosis Date     Adenomatous polyp of colon 2011    needs conolonospy 2016     Hypothyroidism     Abstracted 02     Osteopenia      Past Surgical History:   Procedure Laterality Date     ARTHROPLASTY KNEE Left 2015    Procedure: ARTHROPLASTY KNEE;  Surgeon: Daniel Mack MD;  Location: [facility] OR     C NONSPECIFIC PROCEDURE      Cholecystectomy -- Abstracted 02     CHOLECYSTECTOMY       COLONOSCOPY N/A 2016    Procedure: COLONOSCOPY;  Surgeon: Sage Berg MD;  Location:  GI     CYSTOSCOPY  2012    Procedure:CYSTOSCOPY; Surgeon:GAMA GRIDER; Location:[facility] OR     DAVINCI HYSTERECTOMY SUPRACERVICAL, SACROCOLPOPEXY, COMBINED  2012    Procedure:COMBINED DAVINCI HYSTERECTOMY SUPRACERVICAL, SACROCOLPOPEXY; DAVINCI LAPAROSCOPIC SUPRACERVICAL HYSTERECTOMY, BILATERAL SALPINGO-OOPHORECTOMY, SACROCOLPOPEXY  WITH COLOPLASTY MESH AND CYSTOPLASTY; Surgeon:GAMA GRIDER; Location:[facility] OR     HC COLONOSCOPY THRU STOMA, DIAGNOSTIC       HC REMOVAL OF TONSILS,<13 Y/O      Tonsils <12y.o.     HC TOOTH EXTRACTION W/FORCEP       Family History   Problem Relation Age of Onset     Alzheimer Disease Father      Cancer Mother          non-Hodgkins lymphoma     Osteoporosis Mother      Arthritis Mother         RA     Gastrointestinal Disease Mother         UC     Osteoporosis Sister      Gastrointestinal Disease Sister         diverticulitis     Cardiovascular Maternal Aunt         MI     Breast Cancer Paternal Aunt      Social History     Socioeconomic History     Marital status:      Spouse name: Sage     Number of children: 1     Years of education: 18     Highest education level: Not on file   Occupational History     Occupation: Teacher     Employer: [employer]   Social Needs     Financial resource strain: Not on file      Food insecurity:     Worry: Not on file     Inability: Not on file     Transportation needs:     Medical: Not on file     Non-medical: Not on file   Tobacco Use     Smoking status: Former Smoker     Packs/day: 1.00     Years: 25.00     Pack years: 25.00     Types: Cigarettes     Last attempt to quit: 1987     Years since quittin.9     Smokeless tobacco: Never Used     Tobacco comment:    Substance and Sexual Activity     Alcohol use: Yes     Alcohol/week: 10.0 standard drinks     Comment: 2-3 glasses of wine per wk     Drug use: No     Sexual activity: Yes     Partners: Male     Birth control/protection: Surgical     Comment: vas   Lifestyle     Physical activity:     Days per week: Not on file     Minutes per session: Not on file     Stress: Not on file   Relationships     Social connections:     Talks on phone: Not on file     Gets together: Not on file     Attends Yarsanism service: Not on file     Active member of club or organization: Not on file     Attends meetings of clubs or organizations: Not on file     Relationship status: Not on file     Intimate partner violence:     Fear of current or ex partner: Not on file     Emotionally abused: Not on file     Physically abused: Not on file     Forced sexual activity: Not on file   Other Topics Concern      Service Not Asked     Blood Transfusions Not Asked     Caffeine Concern No     Occupational Exposure No     Hobby Hazards No     Sleep Concern No     Stress Concern No     Weight Concern No     Special Diet No     Back Care No     Exercise Yes     Comment: walk     Bike Helmet Not Asked     Seat Belt Yes     Self-Exams Not Asked     Parent/sibling w/ CABG, MI or angioplasty before 65F 55M? Not Asked   Social History Narrative     Not on file       Allergies:  Sulfa drugs    Current Outpatient Medications   Medication Sig Dispense Refill     Biotin 5000 MCG CAPS        CALCIUM + D 600-200 MG-IU OR TABS 1 TABLET DAILY (Patient taking  differently: 2 TABLETS DAILY) 30 0     calcium polycarbophil (FIBERCON) 625 MG tablet Take 2 tablets by mouth daily       cephALEXin (KEFLEX) 250 MG capsule Take 1 capsule (250 mg) by mouth 3 times daily for 7 days 21 capsule 1     COMPOUNDED NON-CONTROLLED SUBSTANCE (CMPD RX) - PHARMACY TO MIX COMPOUNDED MEDICATION Estradiol 0.02% in HRT heavy (cream base)  Place in tube and include vaginal applicator  Si/2 gm intravaginally at HS on Mon and Thurs pm as directed 42 g 3     GLUCOSAMINE CHONDROITIN COMPLX PO Take 1 tablet by mouth daily 750/600        levothyroxine (SYNTHROID/LEVOTHROID) 100 MCG tablet Take 1 tablet (100 mcg) by mouth daily 90 tablet 0     MULTI-DAY OR TABS one PO QD       nitroFURantoin macrocrystal-monohydrate (MACROBID) 100 MG capsule Take 1 capsule (100 mg) by mouth 2 times daily 14 capsule 0       Review Of Systems   ROS: 10 point ROS neg other than the symptoms noted above in the HPI.    Exam:  /72   Wt 83.5 kg (184 lb)   LMP 1997   BMI 29.70 kg/m    {Constitutional: healthy, alert and no distress    Assessment/Plan:  (Z87.440) Personal history of urinary tract infection  (primary encounter diagnosis)  Comment: Patient has had 2 UTIs in the past year.  She has been on suppression in the past at this point is doing well without antibiotic suppression  Plan: Urine Culture Aerobic Bacterial, UA with         Microscopic        I do not think we need to do random cultures at this point and instead do lab testing only when she is symptomatic.  If all goes well we will see the patient back in 2020 when she returns from her winter break    (N39.0) Recurrent UTI  Comment: Patient did request a prescription for Keflex I did leave her prescription for Keflex 250 mg p.o. 3 times daily for 7 days.  She would begin this after leaving a urine specimen I left her 1 refill  Plan: Urine Culture Aerobic Bacterial, UA with         Microscopic, cephALEXin (KEFLEX) 250 MG capsule         Written plan given    (E07.9) Thyroid dysfunction  Comment: Patient is requesting an extra 90-day supply of her levothyroxine in case she does not get back in time I did leave her an additional 90-day supply of this  Plan: levothyroxine (SYNTHROID/LEVOTHROID) 100 MCG         tablet        I reviewed her thyroid functions    15 min spent w > 50% in counseling        Xavier Austin M.D.

## 2020-04-28 ENCOUNTER — TRANSFERRED RECORDS (OUTPATIENT)
Dept: HEALTH INFORMATION MANAGEMENT | Facility: CLINIC | Age: 75
End: 2020-04-28

## 2020-05-06 ENCOUNTER — TRANSFERRED RECORDS (OUTPATIENT)
Dept: HEALTH INFORMATION MANAGEMENT | Facility: CLINIC | Age: 75
End: 2020-05-06

## 2020-05-27 ENCOUNTER — TELEPHONE (OUTPATIENT)
Dept: OBGYN | Facility: CLINIC | Age: 75
End: 2020-05-27

## 2020-05-27 DIAGNOSIS — Z87.440 PERSONAL HISTORY OF URINARY TRACT INFECTION: Primary | ICD-10-CM

## 2020-05-27 DIAGNOSIS — R30.0 DYSURIA: ICD-10-CM

## 2020-05-27 NOTE — TELEPHONE ENCOUNTER
Pt calling with a small amount of blood on her pad that she routinely wears today.  She does not have any urinary sx.  She does get frequent UTI's but does not feel like this is one.  After further discussion pt believes she may have been to aggressive when washing in the shower today and caused the bleeding.  I will place an order for a urine if pt develops sx.  She will call if she develops sx and leave a urine sample.    Kelly Monroe RN

## 2020-06-01 ENCOUNTER — TELEPHONE (OUTPATIENT)
Dept: INTERNAL MEDICINE | Facility: CLINIC | Age: 75
End: 2020-06-01

## 2020-06-01 DIAGNOSIS — Z87.440 PERSONAL HISTORY OF URINARY TRACT INFECTION: ICD-10-CM

## 2020-06-01 DIAGNOSIS — R30.0 DYSURIA: Primary | ICD-10-CM

## 2020-06-01 DIAGNOSIS — R30.0 DYSURIA: ICD-10-CM

## 2020-06-01 LAB
ALBUMIN UR-MCNC: NEGATIVE MG/DL
APPEARANCE UR: ABNORMAL
BILIRUB UR QL STRIP: NEGATIVE
COLOR UR AUTO: YELLOW
GLUCOSE UR STRIP-MCNC: NEGATIVE MG/DL
HGB UR QL STRIP: ABNORMAL
KETONES UR STRIP-MCNC: NEGATIVE MG/DL
LEUKOCYTE ESTERASE UR QL STRIP: ABNORMAL
NITRATE UR QL: NEGATIVE
NON-SQ EPI CELLS #/AREA URNS LPF: ABNORMAL /LPF
PH UR STRIP: 6 PH (ref 5–7)
RBC #/AREA URNS AUTO: ABNORMAL /HPF
RENAL EPI CELLS #/AREA URNS HPF: ABNORMAL /HPF
SOURCE: ABNORMAL
SP GR UR STRIP: 1.01 (ref 1–1.03)
TRANS CELLS #/AREA URNS HPF: ABNORMAL /HPF
UROBILINOGEN UR STRIP-ACNC: 0.2 EU/DL (ref 0.2–1)
WBC #/AREA URNS AUTO: ABNORMAL /HPF

## 2020-06-01 PROCEDURE — 87086 URINE CULTURE/COLONY COUNT: CPT | Performed by: NURSE PRACTITIONER

## 2020-06-01 PROCEDURE — 81001 URINALYSIS AUTO W/SCOPE: CPT | Performed by: NURSE PRACTITIONER

## 2020-06-01 NOTE — RESULT ENCOUNTER NOTE
Please advise the patient of the positive urine analysis result with pyuria and hematuria.  I am going to await the culture results with appropriate therapy to follow.  I expect to have these back within 24 to 36 hours Xavier Austin MD FACOG

## 2020-06-01 NOTE — TELEPHONE ENCOUNTER
Order urine and culture.    Pt will schedule at [facility].    Kiki MONTIEL R.N.

## 2020-06-01 NOTE — TELEPHONE ENCOUNTER
See message below. One week of cloudy and strong odor of urine.     Has history of recurrent UTIs.     Please advise.   No openings with PCP.     Could see if she can try Evisit? Or lab appt?

## 2020-06-01 NOTE — TELEPHONE ENCOUNTER
We will await the results of her urine analysis and culture with appropriate therapy to follow if her urine analysis is negative I would like to see her in the office to make sure there is nothing else causing the bleeding episode that she had

## 2020-06-01 NOTE — TELEPHONE ENCOUNTER
Pt called Dr June's office and he ordered the UA already. She has lab appt scheduled for today.     Will close this encounter, since they addressed.

## 2020-06-01 NOTE — TELEPHONE ENCOUNTER
Reason for call:  Patient reporting a symptom    Symptom or request: cloudy and odorous urine    Duration (how long have symptoms berien present): 1 week    Have you been treated for this before? Yes    Additional comments: gets recurrent uti;s    Phone Number patient can be reached at:  Cell number on file:    Telephone Information:   Mobile [phone]       Best Time:  any    Can we leave a detailed message on this number:  YES    Call taken on 6/1/2020 at 8:35 AM by CHERI OWEN

## 2020-06-03 ENCOUNTER — HOSPITAL ENCOUNTER (OUTPATIENT)
Dept: MAMMOGRAPHY | Facility: CLINIC | Age: 75
Discharge: HOME | End: 2020-06-03
Attending: INTERNAL MEDICINE | Admitting: INTERNAL MEDICINE
Payer: COMMERCIAL

## 2020-06-03 DIAGNOSIS — Z12.31 SCREENING MAMMOGRAM, ENCOUNTER FOR: ICD-10-CM

## 2020-06-03 LAB
BACTERIA SPEC CULT: NO GROWTH
SPECIMEN SOURCE: NORMAL

## 2020-06-03 PROCEDURE — 77067 SCR MAMMO BI INCL CAD: CPT

## 2020-06-04 NOTE — TELEPHONE ENCOUNTER
Pt called regarding her urine cx.  Reviewed that it showed no growth.  She states that she believes she needs some type of treatment due to the initial results of her urine.  I stated I would send a message to Dr Austin and await response.    I did not see until after the fact that you wanted to see her in clinic.  I know you are doing phone visits 6/5 &6/8.  Are you doing any in person visits these days that I can schedule her for?    Kelly Monroe RN

## 2020-06-05 NOTE — TELEPHONE ENCOUNTER
Pt calls regarding urine results.     Any need for tx?  She is not sure why she would have the blood in her initial sample.    Call cell*    Kiki MONTIEL R.N.

## 2020-06-09 ENCOUNTER — TELEPHONE (OUTPATIENT)
Dept: OBGYN | Facility: CLINIC | Age: 75
End: 2020-06-09

## 2020-06-09 DIAGNOSIS — R31.29 MICROHEMATURIA: Primary | ICD-10-CM

## 2020-06-12 ENCOUNTER — HOSPITAL ENCOUNTER (OUTPATIENT)
Dept: CT IMAGING | Facility: CLINIC | Age: 75
Discharge: HOME | End: 2020-06-12
Attending: OBSTETRICS & GYNECOLOGY | Admitting: OBSTETRICS & GYNECOLOGY
Payer: COMMERCIAL

## 2020-06-12 DIAGNOSIS — R31.29 MICROHEMATURIA: ICD-10-CM

## 2020-06-12 LAB
CREAT BLD-MCNC: 1 MG/DL (ref 0.52–1.04)
GFR SERPL CREATININE-BSD FRML MDRD: 54 ML/MIN/{1.73_M2}

## 2020-06-12 PROCEDURE — 74178 CT ABD&PLV WO CNTR FLWD CNTR: CPT

## 2020-06-12 PROCEDURE — 25000125 ZZHC RX 250: Performed by: RADIOLOGY

## 2020-06-12 PROCEDURE — 82565 ASSAY OF CREATININE: CPT

## 2020-06-12 PROCEDURE — 25000128 H RX IP 250 OP 636: Performed by: RADIOLOGY

## 2020-06-12 RX ORDER — IOPAMIDOL 755 MG/ML
500 INJECTION, SOLUTION INTRAVASCULAR ONCE
Status: COMPLETED | OUTPATIENT
Start: 2020-06-12 | End: 2020-06-12

## 2020-06-12 RX ADMIN — IOPAMIDOL 92 ML: 755 INJECTION, SOLUTION INTRAVENOUS at 15:35

## 2020-06-12 RX ADMIN — SODIUM CHLORIDE 63 ML: 9 INJECTION, SOLUTION INTRAVENOUS at 15:35

## 2020-06-22 ENCOUNTER — OFFICE VISIT (OUTPATIENT)
Dept: OBGYN | Facility: CLINIC | Age: 75
End: 2020-06-22
Payer: COMMERCIAL

## 2020-06-22 VITALS
HEIGHT: 66 IN | WEIGHT: 184 LBS | SYSTOLIC BLOOD PRESSURE: 128 MMHG | DIASTOLIC BLOOD PRESSURE: 84 MMHG | BODY MASS INDEX: 29.57 KG/M2

## 2020-06-22 DIAGNOSIS — N39.0 RECURRENT UTI: ICD-10-CM

## 2020-06-22 DIAGNOSIS — R31.29 MICROHEMATURIA: Primary | ICD-10-CM

## 2020-06-22 PROCEDURE — 99213 OFFICE O/P EST LOW 20 MIN: CPT | Performed by: OBSTETRICS & GYNECOLOGY

## 2020-06-22 ASSESSMENT — MIFFLIN-ST. JEOR: SCORE: 1346.37

## 2020-06-22 NOTE — NURSING NOTE
"Chief Complaint   Patient presents with     Follow Up     Discuss CT results.        Initial /84   Ht 1.676 m (5' 6\")   Wt 83.5 kg (184 lb)   LMP 1997   Breastfeeding No   BMI 29.70 kg/m   Estimated body mass index is 29.7 kg/m  as calculated from the following:    Height as of this encounter: 1.676 m (5' 6\").    Weight as of this encounter: 83.5 kg (184 lb).  BP completed using cuff size: regular    Questioned patient about current smoking habits.  Pt. quit smoking some time ago.          The following HM Due: NONE      The following patient reported/Care Every where data was sent to:  P ABSTRACT QUALITY INITIATIVES [[id number]]  Shannan Alcala LPN             "

## 2020-06-22 NOTE — PROGRESS NOTES
HPI:  Kierra Mcmullen is a 75 year old female  Patient's last menstrual period was 1997.  Status post da Erica supracervical hysterectomy, BSO sacral colpopexy 2012 for symptomatic grade 4 cystocele grade 3 uterine prolapse grade 3 enterocele and grade 1 rectocele  ., who presents for evaluation of microhematuria.  Patient has had a past history of recurrent pyuria cystitis and urinary tract infections.  The time of her recent exam microhematuria was noted.  The patient underwent a CT urogram On 2020 which showed the following  IMPRESSION:   1. No evidence for renal, ureteral, or bladder calculi.  2. No masses or suspicious filling defects within the opacified  urinary collecting system.   3. At least mild-moderate pelvic floor laxity at rest noted.    I also reviewed the normal cystoscopy the patient had with Dr. Connell in .  I discussed with the patient and her  remotely today the pathophysiology of microhematuria and the risks benefits and alternative forms of therapy.  I gave them a detailed written outline I think they have a good understanding.  I also discussed with her of the recurring cystocele the relationship between this and incomplete emptying and the risk benefits and alternative forms of therapy.  The patient at present states that she is doing well and does not desire any further intervention at this time    Past Medical History:   Diagnosis Date     Adenomatous polyp of colon 2011    needs conolonospy 2016     Hypothyroidism     Abstracted 02     Osteopenia      Past Surgical History:   Procedure Laterality Date     ARTHROPLASTY KNEE Left 2015    Procedure: ARTHROPLASTY KNEE;  Surgeon: Daniel Mack MD;  Location:  OR     C NONSPECIFIC PROCEDURE      Cholecystectomy -- Abstracted 02     CHOLECYSTECTOMY       COLONOSCOPY N/A 2016    Procedure: COLONOSCOPY;  Surgeon: Sage Berg MD;  Location:  GI     CYSTOSCOPY   2012    Procedure:CYSTOSCOPY; Surgeon:GAMA GRIDER; Location:[facility]     DAVINCI HYSTERECTOMY SUPRACERVICAL, SACROCOLPOPEXY, COMBINED  2012    Procedure:COMBINED DAVINCI HYSTERECTOMY SUPRACERVICAL, SACROCOLPOPEXY; DAVINCI LAPAROSCOPIC SUPRACERVICAL HYSTERECTOMY, BILATERAL SALPINGO-OOPHORECTOMY, SACROCOLPOPEXY  WITH COLOPLASTY MESH AND CYSTOPLASTY; Surgeon:GAMA GRIDER; Location:[facility] OR     HC COLONOSCOPY THRU STOMA, DIAGNOSTIC       HC REMOVAL OF TONSILS,<11 Y/O      Tonsils <12y.o.     HC TOOTH EXTRACTION W/FORCEP       Family History   Problem Relation Age of Onset     Alzheimer Disease Father      Cancer Mother          non-Hodgkins lymphoma     Osteoporosis Mother      Arthritis Mother         RA     Gastrointestinal Disease Mother         UC     Osteoporosis Sister      Gastrointestinal Disease Sister         diverticulitis     Cardiovascular Maternal Aunt         MI     Breast Cancer Paternal Aunt      Social History     Socioeconomic History     Marital status:      Spouse name: Sage     Number of children: 1     Years of education: 18     Highest education level: Not on file   Occupational History     Occupation: Teacher     Employer: [employer]   Social Needs     Financial resource strain: Not on file     Food insecurity     Worry: Not on file     Inability: Not on file     Transportation needs     Medical: Not on file     Non-medical: Not on file   Tobacco Use     Smoking status: Former Smoker     Packs/day: 1.00     Years: 25.00     Pack years: 25.00     Types: Cigarettes     Last attempt to quit: 1987     Years since quittin.4     Smokeless tobacco: Never Used     Tobacco comment:    Substance and Sexual Activity     Alcohol use: Yes     Alcohol/week: 10.0 standard drinks     Comment: 2-3 glasses of wine per wk     Drug use: No     Sexual activity: Yes     Partners: Male     Birth control/protection: Surgical     Comment: vas   Lifestyle      Physical activity     Days per week: Not on file     Minutes per session: Not on file     Stress: Not on file   Relationships     Social connections     Talks on phone: Not on file     Gets together: Not on file     Attends Jainism service: Not on file     Active member of club or organization: Not on file     Attends meetings of clubs or organizations: Not on file     Relationship status: Not on file     Intimate partner violence     Fear of current or ex partner: Not on file     Emotionally abused: Not on file     Physically abused: Not on file     Forced sexual activity: Not on file   Other Topics Concern      Service Not Asked     Blood Transfusions Not Asked     Caffeine Concern No     Occupational Exposure No     Hobby Hazards No     Sleep Concern No     Stress Concern No     Weight Concern No     Special Diet No     Back Care No     Exercise Yes     Comment: walk     Bike Helmet Not Asked     Seat Belt Yes     Self-Exams Not Asked     Parent/sibling w/ CABG, MI or angioplasty before 65F 55M? Not Asked   Social History Narrative     Not on file       Allergies:  Sulfa drugs    Current Outpatient Medications   Medication Sig Dispense Refill     Biotin 5000 MCG CAPS        CALCIUM + D 600-200 MG-IU OR TABS 1 TABLET DAILY (Patient taking differently: 2 TABLETS DAILY) 30 0     calcium polycarbophil (FIBERCON) 625 MG tablet Take 2 tablets by mouth daily       COMPOUNDED NON-CONTROLLED SUBSTANCE (CMPD RX) - PHARMACY TO MIX COMPOUNDED MEDICATION Estradiol 0.02% in HRT heavy (cream base)  Place in tube and include vaginal applicator  Si/2 gm intravaginally at HS on Mon and Thurs pm as directed 42 g 3     GLUCOSAMINE CHONDROITIN COMPLX PO Take 1 tablet by mouth daily 750/600        levothyroxine (SYNTHROID/LEVOTHROID) 100 MCG tablet Take 1 tablet (100 mcg) by mouth daily 90 tablet 0     MULTI-DAY OR TABS one PO QD       nitroFURantoin macrocrystal-monohydrate (MACROBID) 100 MG capsule Take 1 capsule (100  "mg) by mouth 2 times daily 14 capsule 0       Review Of Systems   ROS: 10 point ROS neg other than the symptoms noted above in the HPI.    Exam:  /84   Ht 1.676 m (5' 6\")   Wt 83.5 kg (184 lb)   LMP 07/22/1997   Breastfeeding No   BMI 29.70 kg/m    {Constitutional: healthy, alert and no distress    Assessment/Plan:  (R31.29) Microhematuria  (primary encounter diagnosis)  Comment: Risks, benefits, and alternative modes of therapy discussed at length. Pathophysiology of the disease process reviewed, all of the patients questions answered and informed consent obtained.    Plan:  Patient to return for routine annual exam or as needed concerns  (N39.0) Recurrent UTI  Comment: Patient will have a urine analysis and culture with appropriate therapy to follow  Plan: COMPOUNDED NON-CONTROLLED SUBSTANCE (CMPD RX) -        PHARMACY TO MIX COMPOUNDED MEDICATION        She is going to continue with the vaginal estrogen that she is on.  A refill was given.  Importance of adequate emptying was reviewed and techniques to ensure adequate emptying were also reviewed  15 min spent w > 50% in counseling        Xavier Austin M.D.           "

## 2020-06-22 NOTE — PATIENT INSTRUCTIONS
You can reach your [facility] Care Team any time of the day by calling [phone]. This number will put you in touch with the 24 hour nurse line if the clinic is closed.    To contact your OB/GYN Station Coordinator/Surgery Scheduler please call [phone]. This is a direct number for your care team between 8 a.m. and 4 p.m. Monday through Friday.    [facility] is open for your convenience:  Monday through Friday 8 a.m. to 6 p.m.  Closed weekends and all major holidays.

## 2020-06-23 NOTE — RESULT ENCOUNTER NOTE
I have discussed this result with the patient.  Please see the office visit notes from 6/22/2020    Xavier Austin MD FACOG

## 2020-06-26 ENCOUNTER — TELEPHONE (OUTPATIENT)
Dept: OBGYN | Facility: CLINIC | Age: 75
End: 2020-06-26

## 2020-06-26 DIAGNOSIS — R31.29 MICROHEMATURIA: Primary | ICD-10-CM

## 2020-06-26 NOTE — TELEPHONE ENCOUNTER
I called patient and left message with her spouse asking her to call back.   Dr. Austin was reviewing her chart and would like her to have a different urine test.  - Called FISH.  She just needs to schedule a lab only urine test.  I have ordered the FISH as future.   Arianna Palacios CMA

## 2020-06-29 ENCOUNTER — MYC MEDICAL ADVICE (OUTPATIENT)
Dept: OBGYN | Facility: CLINIC | Age: 75
End: 2020-06-29

## 2020-06-29 DIAGNOSIS — R31.29 MICROHEMATURIA: ICD-10-CM

## 2020-06-29 PROCEDURE — 88120 CYTP URNE 3-5 PROBES EA SPEC: CPT | Performed by: PHYSICIAN ASSISTANT

## 2020-06-29 NOTE — TELEPHONE ENCOUNTER
I spoke with the patient and reviewed with her the rationale for during the urine FISH.  Patient states that she does have some mild hematuria still present.  I reviewed the results of her previous cystoscopy and discussed that we may at some point repeat that.  Patient's questions were answered

## 2020-07-13 LAB — COPATH REPORT: NORMAL

## 2020-07-16 NOTE — RESULT ENCOUNTER NOTE
I spoke with the patient and her  today regarding the negative urine FISH.  Patient denies any aries hematuria and does not smoke.  I reviewed her negative CT urogram and the previous normal cystoscopy.  I spoke with Dr. Connell a urologist regarding her situation as well.  At this point no additional testing would be indicated.  Dr. Connell did not feel that a cystoscopy would be needed at this time.  Patient is otherwise asymptomatic.  She will call for any changes bleeding or concerns that occur in the interval otherwise return for routine health care maintenance  Xavier Austin MD FACOG

## 2020-08-04 DIAGNOSIS — E07.9 THYROID DYSFUNCTION: Primary | ICD-10-CM

## 2020-08-31 ASSESSMENT — ENCOUNTER SYMPTOMS
EYE PAIN: 0
SHORTNESS OF BREATH: 0
MYALGIAS: 0
FEVER: 0
WEAKNESS: 0
HEMATOCHEZIA: 0
JOINT SWELLING: 1
HEMATURIA: 1
FREQUENCY: 1
NAUSEA: 0
PARESTHESIAS: 0
DIARRHEA: 0
CHILLS: 0
SORE THROAT: 0
HEADACHES: 0
HEARTBURN: 0
CONSTIPATION: 0
DYSURIA: 0
ABDOMINAL PAIN: 0
DIZZINESS: 0
PALPITATIONS: 0
ARTHRALGIAS: 1
COUGH: 0
NERVOUS/ANXIOUS: 0
BREAST MASS: 0

## 2020-08-31 ASSESSMENT — ACTIVITIES OF DAILY LIVING (ADL): CURRENT_FUNCTION: NO ASSISTANCE NEEDED

## 2020-09-02 ENCOUNTER — OFFICE VISIT (OUTPATIENT)
Dept: INTERNAL MEDICINE | Facility: CLINIC | Age: 75
End: 2020-09-02
Payer: COMMERCIAL

## 2020-09-02 VITALS
SYSTOLIC BLOOD PRESSURE: 120 MMHG | OXYGEN SATURATION: 99 % | RESPIRATION RATE: 12 BRPM | BODY MASS INDEX: 28.93 KG/M2 | TEMPERATURE: 98.5 F | WEIGHT: 180 LBS | DIASTOLIC BLOOD PRESSURE: 72 MMHG | HEART RATE: 80 BPM | HEIGHT: 66 IN

## 2020-09-02 DIAGNOSIS — E07.9 THYROID DYSFUNCTION: ICD-10-CM

## 2020-09-02 DIAGNOSIS — E55.9 VITAMIN D DEFICIENCY: ICD-10-CM

## 2020-09-02 DIAGNOSIS — Z00.00 ENCOUNTER FOR PREVENTATIVE ADULT HEALTH CARE EXAMINATION: Primary | ICD-10-CM

## 2020-09-02 LAB
ALBUMIN UR-MCNC: NEGATIVE MG/DL
APPEARANCE UR: CLEAR
BACTERIA #/AREA URNS HPF: ABNORMAL /HPF
BILIRUB UR QL STRIP: NEGATIVE
COLOR UR AUTO: YELLOW
ERYTHROCYTE [DISTWIDTH] IN BLOOD BY AUTOMATED COUNT: 14.5 % (ref 10–15)
GLUCOSE UR STRIP-MCNC: NEGATIVE MG/DL
HCT VFR BLD AUTO: 54.7 % (ref 35–47)
HGB BLD-MCNC: 17.9 G/DL (ref 11.7–15.7)
HGB UR QL STRIP: NEGATIVE
KETONES UR STRIP-MCNC: NEGATIVE MG/DL
LEUKOCYTE ESTERASE UR QL STRIP: ABNORMAL
MCH RBC QN AUTO: 29.6 PG (ref 26.5–33)
MCHC RBC AUTO-ENTMCNC: 32.7 G/DL (ref 31.5–36.5)
MCV RBC AUTO: 91 FL (ref 78–100)
NITRATE UR QL: NEGATIVE
PH UR STRIP: 7 PH (ref 5–7)
PLATELET # BLD AUTO: 196 10E9/L (ref 150–450)
RBC # BLD AUTO: 6.04 10E12/L (ref 3.8–5.2)
RBC #/AREA URNS AUTO: ABNORMAL /HPF
SOURCE: ABNORMAL
SP GR UR STRIP: 1.01 (ref 1–1.03)
UROBILINOGEN UR STRIP-ACNC: 0.2 EU/DL (ref 0.2–1)
WBC # BLD AUTO: 5.3 10E9/L (ref 4–11)
WBC #/AREA URNS AUTO: ABNORMAL /HPF

## 2020-09-02 PROCEDURE — 99397 PER PM REEVAL EST PAT 65+ YR: CPT | Performed by: INTERNAL MEDICINE

## 2020-09-02 PROCEDURE — 87086 URINE CULTURE/COLONY COUNT: CPT | Performed by: INTERNAL MEDICINE

## 2020-09-02 PROCEDURE — 82306 VITAMIN D 25 HYDROXY: CPT | Performed by: INTERNAL MEDICINE

## 2020-09-02 PROCEDURE — 84443 ASSAY THYROID STIM HORMONE: CPT | Performed by: INTERNAL MEDICINE

## 2020-09-02 PROCEDURE — 81001 URINALYSIS AUTO W/SCOPE: CPT | Performed by: INTERNAL MEDICINE

## 2020-09-02 PROCEDURE — 36415 COLL VENOUS BLD VENIPUNCTURE: CPT | Performed by: INTERNAL MEDICINE

## 2020-09-02 PROCEDURE — 85027 COMPLETE CBC AUTOMATED: CPT | Performed by: INTERNAL MEDICINE

## 2020-09-02 PROCEDURE — 80061 LIPID PANEL: CPT | Performed by: INTERNAL MEDICINE

## 2020-09-02 PROCEDURE — 80053 COMPREHEN METABOLIC PANEL: CPT | Performed by: INTERNAL MEDICINE

## 2020-09-02 RX ORDER — LEVOTHYROXINE SODIUM 100 UG/1
100 TABLET ORAL DAILY
Qty: 90 TABLET | Refills: 3 | Status: SHIPPED | OUTPATIENT
Start: 2020-09-02 | End: 2021-01-11

## 2020-09-02 RX ORDER — LEVOTHYROXINE SODIUM 100 UG/1
100 TABLET ORAL DAILY
Qty: 90 TABLET | Refills: 3 | Status: SHIPPED | OUTPATIENT
Start: 2020-09-02 | End: 2020-09-02

## 2020-09-02 ASSESSMENT — ENCOUNTER SYMPTOMS
FREQUENCY: 1
HEADACHES: 0
NAUSEA: 0
PALPITATIONS: 0
DIARRHEA: 0
FEVER: 0
CHILLS: 0
HEMATURIA: 1
SORE THROAT: 0
EYE PAIN: 0
ARTHRALGIAS: 1
DIZZINESS: 0
SHORTNESS OF BREATH: 0
COUGH: 0
HEARTBURN: 0
NERVOUS/ANXIOUS: 0
DYSURIA: 0
JOINT SWELLING: 1
CONSTIPATION: 0
HEMATOCHEZIA: 0
WEAKNESS: 0
ABDOMINAL PAIN: 0
MYALGIAS: 0
BREAST MASS: 0
PARESTHESIAS: 0

## 2020-09-02 ASSESSMENT — ACTIVITIES OF DAILY LIVING (ADL): CURRENT_FUNCTION: NO ASSISTANCE NEEDED

## 2020-09-02 ASSESSMENT — MIFFLIN-ST. JEOR: SCORE: 1328.22

## 2020-09-02 NOTE — NURSING NOTE
"/72   Pulse 80   Temp 98.5  F (36.9  C) (Oral)   Resp 12   Ht 1.676 m (5' 6\")   Wt 81.6 kg (180 lb)   LMP 07/22/1997   SpO2 99%   Breastfeeding No   BMI 29.05 kg/m      "

## 2020-09-02 NOTE — PROGRESS NOTES
"SUBJECTIVE:   Kierra Mcmullen is a 75 year old female who presents for Preventive Visit.    Are you in the first 12 months of your Medicare coverage?  No    Healthy Habits:     In general, how would you rate your overall health?  Excellent    Frequency of exercise:  6-7 days/week    Duration of exercise:  30-45 minutes    Do you usually eat at least 4 servings of fruit and vegetables a day, include whole grains    & fiber and avoid regularly eating high fat or \"junk\" foods?  Yes    Taking medications regularly:  Yes    Medication side effects:  None    Ability to successfully perform activities of daily living:  No assistance needed    Home Safety:  Throw rugs in the hallway and lack of grab bars in the bathroom    Hearing Impairment:  No hearing concerns    In the past 6 months, have you been bothered by leaking of urine? Yes    In general, how would you rate your overall mental or emotional health?  Excellent      PHQ-2 Total Score: 0    Do you feel safe in your environment? Yes    Have you ever done Advance Care Planning? (For example, a Health Directive, POLST, or a discussion with a medical provider or your loved ones about your wishes): Yes, advance care planning is on file.      Fall risk  Fallen 2 or more times in the past year?: No  Any fall with injury in the past year?: No    Cognitive Screening   1) Repeat 3 items (Leader, Season, Table)    2) Clock draw:   3) 3 item recall: Recalls 3 objects  Results: 3 items recalled: COGNITIVE IMPAIRMENT LESS LIKELY    Mini-CogTM Copyright ANOOP Olivares. Licensed by the author for use in [facility]; reprinted with permission (mariana@.[facility]). All rights reserved.      Do you have sleep apnea, excessive snoring or daytime drowsiness?: no    Reviewed and updated as needed this visit by clinical staff  Tobacco  Allergies  Meds  Med Hx  Surg Hx  Fam Hx  Soc Hx        Reviewed and updated as needed this visit by Provider        Social History     Tobacco Use "     Smoking status: Former Smoker     Packs/day: 1.00     Years: 25.00     Pack years: 25.00     Types: Cigarettes     Start date: 1962     Last attempt to quit: 1987     Years since quittin.6     Smokeless tobacco: Never Used     Tobacco comment:    Substance Use Topics     Alcohol use: Yes     Alcohol/week: 10.0 standard drinks     Comment: 1-2 glasses of wine/week     If you drink alcohol do you typically have >3 drinks per day or >7 drinks per week? No    Alcohol Use 2020   Prescreen: >3 drinks/day or >7 drinks/week? No   Prescreen: >3 drinks/day or >7 drinks/week? -   No flowsheet data found.        PROBLEMS TO ADD ON...  -------------------------------------  Has history of hypothyroidism. On replacement treatment with Synthroid. No heat /cold intolerance, heart palpitations, weight loss/ gain ,  change in bowel habits.      Current providers sharing in care for this patient include:   Patient Care Team:  Matt Whitney MD as PCP - General (Internal Medicine)  Matt Whitney MD as Assigned PCP    The following health maintenance items are reviewed in Epic and correct as of today:  Health Maintenance   Topic Date Due     MEDICARE ANNUAL WELLNESS VISIT  2020     TSH W/FREE T4 REFLEX  2020     FALL RISK ASSESSMENT  2020     INFLUENZA VACCINE (1) 2020     COLORECTAL CANCER SCREENING  2021     MAMMO SCREENING  2021     ADVANCE CARE PLANNING  2022     DTAP/TDAP/TD IMMUNIZATION (3 - Td) 10/01/2022     LIPID  2024     DEXA  Completed     PHQ-2  Completed     PNEUMOCOCCAL IMMUNIZATION 65+ LOW/MEDIUM RISK  Completed     ZOSTER IMMUNIZATION  Completed     HEPATITIS C SCREENING  Addressed     IPV IMMUNIZATION  Aged Out     MENINGITIS IMMUNIZATION  Aged Out     HEPATITIS B IMMUNIZATION  Aged Out     Lab work is in process  Labs reviewed in EPIC      Review of Systems   Constitutional: Negative for chills and fever.   HENT: Positive for hearing  "loss. Negative for congestion, ear pain and sore throat.    Eyes: Positive for visual disturbance. Negative for pain.   Respiratory: Negative for cough and shortness of breath.    Cardiovascular: Negative for chest pain, palpitations and peripheral edema.   Gastrointestinal: Negative for abdominal pain, constipation, diarrhea, heartburn, hematochezia and nausea.   Breasts:  Negative for tenderness, breast mass and discharge.   Genitourinary: Positive for frequency, hematuria, urgency and vaginal discharge. Negative for dysuria, genital sores, pelvic pain and vaginal bleeding.   Musculoskeletal: Positive for arthralgias and joint swelling. Negative for myalgias.   Skin: Negative for rash.   Neurological: Negative for dizziness, weakness, headaches and paresthesias.   Psychiatric/Behavioral: Negative for mood changes. The patient is not nervous/anxious.          OBJECTIVE:   LMP 07/22/1997  Estimated body mass index is 29.7 kg/m  as calculated from the following:    Height as of 6/22/20: 1.676 m (5' 6\").    Weight as of 6/22/20: 83.5 kg (184 lb).  Physical Exam  GENERAL: healthy, alert and no distress  EYES: Eyes grossly normal to inspection, PERRL and conjunctivae and sclerae normal  HENT: ear canals and TM's normal, nose and mouth without ulcers or lesions  NECK: no adenopathy, no asymmetry, masses, or scars and thyroid normal to palpation  RESP: lungs clear to auscultation - no rales, rhonchi or wheezes  CV: regular rate and rhythm, normal S1 S2, no S3 or S4, no murmur, click or rub, no peripheral edema and peripheral pulses strong  ABDOMEN: soft, nontender, no hepatosplenomegaly, no masses and bowel sounds normal  MS: no gross musculoskeletal defects noted, no edema  SKIN: no suspicious lesions or rashes  NEURO: Normal strength and tone, mentation intact and speech normal  PSYCH: mentation appears normal, affect normal/bright    Diagnostic Test Results:  Labs reviewed in Epic    ASSESSMENT / PLAN:       ICD-10-CM  " "  1. Encounter for preventative adult health care examination  Z00.00 CBC with platelets     Comprehensive metabolic panel     Lipid panel reflex to direct LDL Fasting     TSH with free T4 reflex     *UA reflex to Microscopic   2. Thyroid dysfunction  E07.9 TSH with free T4 reflex     levothyroxine (SYNTHROID/LEVOTHROID) 100 MCG tablet     DISCONTINUED: levothyroxine (SYNTHROID/LEVOTHROID) 100 MCG tablet   3. Vitamin D deficiency  E55.9 Vitamin D Deficiency       COUNSELING:  Reviewed preventive health counseling, as reflected in patient instructions       Regular exercise       Healthy diet/nutrition       Vision screening       Hearing screening       Colon cancer screening    Estimated body mass index is 29.7 kg/m  as calculated from the following:    Height as of 6/22/20: 1.676 m (5' 6\").    Weight as of 6/22/20: 83.5 kg (184 lb).        She reports that she quit smoking about 33 years ago. Her smoking use included cigarettes. She started smoking about 58 years ago. She has a 25.00 pack-year smoking history. She has never used smokeless tobacco.      Appropriate preventive services were discussed with this patient, including applicable screening as appropriate for cardiovascular disease, diabetes, osteopenia/osteoporosis, and glaucoma.  As appropriate for age/gender, discussed screening for colorectal cancer, prostate cancer, breast cancer, and cervical cancer. Checklist reviewing preventive services available has been given to the patient.    Reviewed patients plan of care and provided an AVS. The Intermediate Care Plan ( asthma action plan, low back pain action plan, and migraine action plan) for Kierra meets the Care Plan requirement. This Care Plan has been established and reviewed with the Patient.    Counseling Resources:  ATP IV Guidelines  Pooled Cohorts Equation Calculator  Breast Cancer Risk Calculator  Breast Cancer: Medication to Reduce Risk  FRAX Risk Assessment  ICSI Preventive Guidelines  Dietary " Guidelines for Americans, 2010  USDA's MyPlate  ASA Prophylaxis  Lung CA Screening    Matt Whitney MD  [facility]    Identified Health Risks:

## 2020-09-03 ENCOUNTER — TELEPHONE (OUTPATIENT)
Dept: OBGYN | Facility: CLINIC | Age: 75
End: 2020-09-03

## 2020-09-03 LAB
ALBUMIN SERPL-MCNC: 3.6 G/DL (ref 3.4–5)
ALP SERPL-CCNC: 65 U/L (ref 40–150)
ALT SERPL W P-5'-P-CCNC: 21 U/L (ref 0–50)
ANION GAP SERPL CALCULATED.3IONS-SCNC: 6 MMOL/L (ref 3–14)
AST SERPL W P-5'-P-CCNC: 13 U/L (ref 0–45)
BACTERIA SPEC CULT: NORMAL
BILIRUB SERPL-MCNC: 0.6 MG/DL (ref 0.2–1.3)
BUN SERPL-MCNC: 14 MG/DL (ref 7–30)
CALCIUM SERPL-MCNC: 9.4 MG/DL (ref 8.5–10.1)
CHLORIDE SERPL-SCNC: 107 MMOL/L (ref 94–109)
CHOLEST SERPL-MCNC: 185 MG/DL
CO2 SERPL-SCNC: 27 MMOL/L (ref 20–32)
CREAT SERPL-MCNC: 0.86 MG/DL (ref 0.52–1.04)
DEPRECATED CALCIDIOL+CALCIFEROL SERPL-MC: 42 UG/L (ref 20–75)
GFR SERPL CREATININE-BSD FRML MDRD: 65 ML/MIN/{1.73_M2}
GLUCOSE SERPL-MCNC: 94 MG/DL (ref 70–99)
HDLC SERPL-MCNC: 71 MG/DL
LDLC SERPL CALC-MCNC: 94 MG/DL
NONHDLC SERPL-MCNC: 114 MG/DL
POTASSIUM SERPL-SCNC: 4.1 MMOL/L (ref 3.4–5.3)
PROT SERPL-MCNC: 7.8 G/DL (ref 6.8–8.8)
SODIUM SERPL-SCNC: 140 MMOL/L (ref 133–144)
SPECIMEN SOURCE: NORMAL
TRIGL SERPL-MCNC: 100 MG/DL
TSH SERPL DL<=0.005 MIU/L-ACNC: 0.66 MU/L (ref 0.4–4)

## 2020-09-03 NOTE — TELEPHONE ENCOUNTER
Dr Austin, pt calls to have you review her urine results from her annual exam yesterday since you have dealt with this in the past.  Culture still pending at this time.    Kelly Monroe RN

## 2020-09-04 DIAGNOSIS — R71.8 ELEVATED RED BLOOD CELL COUNT: Primary | ICD-10-CM

## 2020-09-07 NOTE — TELEPHONE ENCOUNTER
I spoke with the patient regarding the results of the urine culture from September 2, 2020 showing 100,000 colonies of mixed tammy.  The patient is asymptomatic denies dysuria hematuria fevers chills or other concerning symptoms.  In light of the fact that she is asymptomatic and there is no pure culture I recommended considering continuing with conservative therapy with no antibiotic treatment at this time.  I also reviewed the results of her recent hemoglobin value of 17.9 on 9/2/2020.  Dr. Whitney's  office placed an order for repeat CBC and I strongly encourage the patient to have this done such that he can review the results and make appropriate treatment recommendations

## 2020-09-17 DIAGNOSIS — E07.9 THYROID DYSFUNCTION: ICD-10-CM

## 2020-09-17 DIAGNOSIS — R71.8 ELEVATED RED BLOOD CELL COUNT: ICD-10-CM

## 2020-09-17 LAB
BASOPHILS # BLD AUTO: 0 10E9/L (ref 0–0.2)
BASOPHILS NFR BLD AUTO: 0.3 %
DIFFERENTIAL METHOD BLD: NORMAL
EOSINOPHIL # BLD AUTO: 0.2 10E9/L (ref 0–0.7)
EOSINOPHIL NFR BLD AUTO: 2.8 %
ERYTHROCYTE [DISTWIDTH] IN BLOOD BY AUTOMATED COUNT: 14.2 % (ref 10–15)
HCT VFR BLD AUTO: 45 % (ref 35–47)
HGB BLD-MCNC: 14.6 G/DL (ref 11.7–15.7)
LYMPHOCYTES # BLD AUTO: 1.2 10E9/L (ref 0.8–5.3)
LYMPHOCYTES NFR BLD AUTO: 19.6 %
MCH RBC QN AUTO: 29.6 PG (ref 26.5–33)
MCHC RBC AUTO-ENTMCNC: 32.4 G/DL (ref 31.5–36.5)
MCV RBC AUTO: 91 FL (ref 78–100)
MONOCYTES # BLD AUTO: 0.5 10E9/L (ref 0–1.3)
MONOCYTES NFR BLD AUTO: 8.5 %
NEUTROPHILS # BLD AUTO: 4.2 10E9/L (ref 1.6–8.3)
NEUTROPHILS NFR BLD AUTO: 68.8 %
PLATELET # BLD AUTO: 233 10E9/L (ref 150–450)
RBC # BLD AUTO: 4.94 10E12/L (ref 3.8–5.2)
TSH SERPL DL<=0.005 MIU/L-ACNC: 0.54 MU/L (ref 0.4–4)
WBC # BLD AUTO: 6.1 10E9/L (ref 4–11)

## 2020-09-17 PROCEDURE — 84443 ASSAY THYROID STIM HORMONE: CPT | Performed by: INTERNAL MEDICINE

## 2020-09-17 PROCEDURE — 85025 COMPLETE CBC W/AUTO DIFF WBC: CPT | Performed by: INTERNAL MEDICINE

## 2020-09-17 PROCEDURE — 36415 COLL VENOUS BLD VENIPUNCTURE: CPT | Performed by: INTERNAL MEDICINE

## 2020-09-18 NOTE — RESULT ENCOUNTER NOTE
Kierra, all your results are within normal limits.  Please continue with the same dosage of your thyroid medication  Xavier Austin M.D.

## 2020-11-17 ENCOUNTER — TRANSFERRED RECORDS (OUTPATIENT)
Dept: HEALTH INFORMATION MANAGEMENT | Facility: CLINIC | Age: 75
End: 2020-11-17

## 2020-11-27 DIAGNOSIS — N39.0 RECURRENT UTI: ICD-10-CM

## 2020-11-27 NOTE — TELEPHONE ENCOUNTER
Routing refill request to provider for review/approval because:  Drug not on the [facility] refill protocol     Kelly Monroe RN

## 2020-12-03 ENCOUNTER — TELEPHONE (OUTPATIENT)
Dept: OBGYN | Facility: CLINIC | Age: 75
End: 2020-12-03

## 2020-12-03 DIAGNOSIS — R30.0 DYSURIA: Primary | ICD-10-CM

## 2020-12-03 DIAGNOSIS — R30.0 DYSURIA: ICD-10-CM

## 2020-12-03 PROCEDURE — 87086 URINE CULTURE/COLONY COUNT: CPT | Performed by: OBSTETRICS & GYNECOLOGY

## 2020-12-03 NOTE — TELEPHONE ENCOUNTER
Pt advised.  She states that Macrobid does not work for her.    She would like a written order for a urinalysis in case she needs one while in [place].  She has used Cephalexin in the past, she would like a paper copy of an Rx in case she needs.  Could we send paper copies of the requested orders/Rx to her home?    She also wants to have the UA prior to leaving.  Scheduled for today, ordered.    Kelly Monroe RN

## 2020-12-03 NOTE — TELEPHONE ENCOUNTER
Please contact the patient and inform her that I sent the prescription for Keflex 250 p.o. 3 times daily for 7 days to the listed [facility] pharmacy in [place].  Her insurance company would not cover Macrobid as they considered nonformulary.  I also placed the order for the urine analysis and urine culture.  Please ask her to travel safe and call if she has any questions  Thank you  Xavier Austin MD FACOG

## 2020-12-03 NOTE — TELEPHONE ENCOUNTER
In light of her past history of UTIs and pyuria I think doing a urine analysis and urine culture before she leaves for [place] is a good idea.  I did leave her a prescription for Macrobid 100 mg p.o. twice daily for 7 days should she need this.  Please inform the patient of these recommendations   Xavier Austin MD FACOG

## 2020-12-04 LAB
BACTERIA SPEC CULT: NORMAL
Lab: NORMAL
SPECIMEN SOURCE: NORMAL

## 2020-12-04 NOTE — RESULT ENCOUNTER NOTE
I spoke with the patient this morning and informed her that I signed a written prescription for Keflex and a written prescription for urinalysis and culture that she could take with her when she goes to [place] should she have urinary symptoms.  I reviewed the preliminary results of her UC being negative.  The patient is asymptomatic at this time and she will call for any concerns or questions in the interval  Xavier Austin MD FACOG

## 2020-12-05 NOTE — RESULT ENCOUNTER NOTE
Kierra, all your results are within normal limits.  There is no evidence of a bladder infection at this time.  The organisms seen on culture are consistent with normal urogenital bacterial inhabitants and do not represent infection or other abnormality.  Travel safe.  Please let me know if have any additional questions  Xavier Austin M.D.

## 2021-01-10 ENCOUNTER — MYC MEDICAL ADVICE (OUTPATIENT)
Dept: INTERNAL MEDICINE | Facility: CLINIC | Age: 76
End: 2021-01-10

## 2021-01-10 DIAGNOSIS — E07.9 THYROID DYSFUNCTION: ICD-10-CM

## 2021-01-11 RX ORDER — LEVOTHYROXINE SODIUM 100 UG/1
100 TABLET ORAL DAILY
Qty: 90 TABLET | Refills: 2 | Status: SHIPPED | OUTPATIENT
Start: 2021-01-11 | End: 2021-10-04

## 2021-01-11 NOTE — TELEPHONE ENCOUNTER
Levothyroxine refill request  Prescription approved per [facility] Refill Protocol.      Last OV on 9/2/20    TSH   Date Value Ref Range Status   09/17/2020 0.54 0.40 - 4.00 mU/L Final

## 2021-03-11 ENCOUNTER — TRANSFERRED RECORDS (OUTPATIENT)
Dept: HEALTH INFORMATION MANAGEMENT | Facility: CLINIC | Age: 76
End: 2021-03-11

## 2021-03-11 ENCOUNTER — TELEPHONE (OUTPATIENT)
Dept: OBGYN | Facility: CLINIC | Age: 76
End: 2021-03-11

## 2021-03-11 NOTE — TELEPHONE ENCOUNTER
Pt calling from [place].  States she had a UC done, the results are supposed to be sent to us when complete.    States she has a strong urine scent, but no other symptoms.  This is typical for her.  She has Rx from [place] that she is going to take.      Kelly Monroe RN

## 2021-03-14 NOTE — TELEPHONE ENCOUNTER
I spoke with the patient today who is in [place] regarding a phone call   she placed to the office earlier.  At that time she noticed a malodor to her urine without any vaginal symptoms dysuria or hematuria.  The patient has a past history of urinary tract symptoms.  Please see the office visit notes of 6/22/2020 for further details.  The patient did have a prescription for cephalexin 250 mg p.o. 3 times daily that she began to take as she was concerned about her urinary symptoms.  She did begin taking that after she went to the hospital in [place] and had a urine culture performed.  That urine culture result is still pending.  The hospital was going forward that result to us for further evaluation.  Today I discussed with the patient the following plan.  If the urine culture is negative she can finish out prescription for cephalexin.  If the culture is positive and the organism is sensitive to cephalexin she will finish the course of cephalexin.  The patient likes to have a prescription of antibiotics just in case.  We can phone a refill for cephalexin 250 mg p.o. 3 times daily for 7 days to her listed pharmacy in [place].  The patient will call the office early next week to update her symptoms and confirmed this plan and tell us where in [place] she would like to have the prescription sent.  She is returning to [place] on March 30, 2021  Xavier Austin MD FACOG

## 2021-03-19 NOTE — TELEPHONE ENCOUNTER
Results came stating:  Growth of citrobacter freundii susceptible to cipro, macrobid, levaquin, bactrim.    Resistant only to augmentin, cefoxitin, cefuroxime      LM on Dr Austin's cell phone with info and instruction to call us here or call pt.  Dr Austin has been communicating with this pt while gone.    LM for pt with info above. Sent mychart to ask about pharmacy.    Kiki MONTIEL R.N.

## 2021-03-19 NOTE — TELEPHONE ENCOUNTER
Pt calls to see if we have received results.    From [facility] , Medical Records     They are refaxing to       Kiki MONTIEL R.N.

## 2021-03-20 ENCOUNTER — TELEPHONE (OUTPATIENT)
Dept: NURSING | Facility: CLINIC | Age: 76
End: 2021-03-20

## 2021-03-20 DIAGNOSIS — N39.0 RECURRENT UTI: Primary | ICD-10-CM

## 2021-03-20 NOTE — TELEPHONE ENCOUNTER
Patient is currently in [place]. She went into a local clinic for a urine test on 3/11/21 with an order from Dr. Austin. After doing this patient started a seven day course of Cephalexin that was prescribed for her just in case she needed it since she gets frequent UTI's. Patient finished the antibiotics on 3/17/21. Patient is currently feeling well, but typically does not have a lot of symptoms with UTI's, just an increase in urine odor. Patient received a [employer] message yesterday asking what pharmacy she uses. Patient does not want to do another course of antibiotics unless it is needed. Patient will be back in [place] 4/1/21 and would like to do a repeat UA then.    Please review and call patient. Please advise if another course of antibiotics is needed based on urine culture or if patient can wait and do a repeat UA in April.     Dian Braden RN  [facility]

## 2021-03-22 NOTE — TELEPHONE ENCOUNTER
Patient calling back.  Would like to do an repeat urine while in [place].  Please fax order to [phone].    Prefers not to wait until April.    Please call pt when request has been faxed.      Alberta Johnson RN

## 2021-03-22 NOTE — TELEPHONE ENCOUNTER
We only asked pharmacy to be proactive in case Dr Austin wanted to send something.    This is awaiting his response.    Kiki MONTIEL R.N.

## 2021-03-23 NOTE — TELEPHONE ENCOUNTER
Pt calls again to ask, do you want another urine test?    She took cephalexin right after she left first urine (she took 3 tabs daily for 7 days of 250mg cephalexin).    She has some urine odor but no pain.    Do you want her to have another urine culture while in [place] or wait until 4/27/21 at her appt with you.      Kiki MONTIEL R.N.

## 2021-03-28 NOTE — TELEPHONE ENCOUNTER
I spoke with the patient today regarding her recent positive urine culture and the course of cephalexin that the patient took.  The organism was not run specifically against cephalexin but at present the patient is asymptomatic.  She is due to return home on March 31.  I placed a future order for a urine analysis and urine culture and she will get this done at the [facility] office.  I will see the results with appropriate therapy to follow.  She will call for any concerns in the interval.  Xavier Austin MD FACOG

## 2021-04-01 DIAGNOSIS — N39.0 RECURRENT UTI: ICD-10-CM

## 2021-04-01 DIAGNOSIS — R82.90 NONSPECIFIC FINDING ON EXAMINATION OF URINE: Primary | ICD-10-CM

## 2021-04-01 PROCEDURE — 87186 SC STD MICRODIL/AGAR DIL: CPT | Performed by: OBSTETRICS & GYNECOLOGY

## 2021-04-01 PROCEDURE — 81001 URINALYSIS AUTO W/SCOPE: CPT | Performed by: OBSTETRICS & GYNECOLOGY

## 2021-04-01 PROCEDURE — 87086 URINE CULTURE/COLONY COUNT: CPT | Performed by: OBSTETRICS & GYNECOLOGY

## 2021-04-01 PROCEDURE — 87088 URINE BACTERIA CULTURE: CPT | Performed by: OBSTETRICS & GYNECOLOGY

## 2021-04-03 LAB
BACTERIA SPEC CULT: ABNORMAL
Lab: ABNORMAL
SPECIMEN SOURCE: ABNORMAL

## 2021-04-04 ENCOUNTER — TELEPHONE (OUTPATIENT)
Dept: OBGYN | Facility: CLINIC | Age: 76
End: 2021-04-04

## 2021-04-04 DIAGNOSIS — N30.00 ACUTE CYSTITIS WITHOUT HEMATURIA: Primary | ICD-10-CM

## 2021-04-04 RX ORDER — CIPROFLOXACIN 500 MG/1
500 TABLET, FILM COATED ORAL 2 TIMES DAILY
Qty: 10 TABLET | Refills: 0 | Status: SHIPPED | OUTPATIENT
Start: 2021-04-04 | End: 2021-04-09

## 2021-04-05 NOTE — TELEPHONE ENCOUNTER
I spoke with the patient about this and informed her of the available prescription.  She will have a follow-up urine culture done in 2 weeks to document resolution.  The patient has a future office appointment with me in the urine culture could be done at the follow-up office visit there is no need for a phone triage follow-up nurse call  Xavier Austin MD FACOG

## 2021-04-13 ENCOUNTER — OFFICE VISIT (OUTPATIENT)
Dept: INTERNAL MEDICINE | Facility: CLINIC | Age: 76
End: 2021-04-13
Payer: COMMERCIAL

## 2021-04-13 VITALS
HEART RATE: 81 BPM | HEIGHT: 65 IN | BODY MASS INDEX: 30.73 KG/M2 | TEMPERATURE: 97.9 F | OXYGEN SATURATION: 97 % | WEIGHT: 184.44 LBS | SYSTOLIC BLOOD PRESSURE: 118 MMHG | DIASTOLIC BLOOD PRESSURE: 70 MMHG

## 2021-04-13 DIAGNOSIS — Z78.0 MENOPAUSE: ICD-10-CM

## 2021-04-13 DIAGNOSIS — Z00.00 ENCOUNTER FOR PREVENTATIVE ADULT HEALTH CARE EXAMINATION: Primary | ICD-10-CM

## 2021-04-13 DIAGNOSIS — N39.0 URINARY TRACT INFECTION WITHOUT HEMATURIA, SITE UNSPECIFIED: ICD-10-CM

## 2021-04-13 DIAGNOSIS — E03.9 ACQUIRED HYPOTHYROIDISM: ICD-10-CM

## 2021-04-13 PROCEDURE — 99397 PER PM REEVAL EST PAT 65+ YR: CPT | Performed by: INTERNAL MEDICINE

## 2021-04-13 ASSESSMENT — MIFFLIN-ST. JEOR: SCORE: 1331.44

## 2021-04-13 ASSESSMENT — ACTIVITIES OF DAILY LIVING (ADL): CURRENT_FUNCTION: NO ASSISTANCE NEEDED

## 2021-04-14 ENCOUNTER — TELEPHONE (OUTPATIENT)
Dept: INTERNAL MEDICINE | Facility: CLINIC | Age: 76
End: 2021-04-14

## 2021-04-16 DIAGNOSIS — E03.9 ACQUIRED HYPOTHYROIDISM: ICD-10-CM

## 2021-04-16 DIAGNOSIS — R82.90 ABNORMAL FINDING ON URINALYSIS: Primary | ICD-10-CM

## 2021-04-16 DIAGNOSIS — Z00.00 ENCOUNTER FOR PREVENTATIVE ADULT HEALTH CARE EXAMINATION: ICD-10-CM

## 2021-04-16 LAB
ALBUMIN SERPL-MCNC: 3.5 G/DL (ref 3.4–5)
ALBUMIN UR-MCNC: NEGATIVE MG/DL
ALP SERPL-CCNC: 69 U/L (ref 40–150)
ALT SERPL W P-5'-P-CCNC: 23 U/L (ref 0–50)
ANION GAP SERPL CALCULATED.3IONS-SCNC: 3 MMOL/L (ref 3–14)
APPEARANCE UR: CLEAR
AST SERPL W P-5'-P-CCNC: 17 U/L (ref 0–45)
BACTERIA #/AREA URNS HPF: ABNORMAL /HPF
BILIRUB SERPL-MCNC: 0.6 MG/DL (ref 0.2–1.3)
BILIRUB UR QL STRIP: NEGATIVE
BUN SERPL-MCNC: 18 MG/DL (ref 7–30)
CALCIUM SERPL-MCNC: 8.9 MG/DL (ref 8.5–10.1)
CHLORIDE SERPL-SCNC: 103 MMOL/L (ref 94–109)
CHOLEST SERPL-MCNC: 175 MG/DL
CO2 SERPL-SCNC: 30 MMOL/L (ref 20–32)
COLOR UR AUTO: YELLOW
CREAT SERPL-MCNC: 0.84 MG/DL (ref 0.52–1.04)
ERYTHROCYTE [DISTWIDTH] IN BLOOD BY AUTOMATED COUNT: 13.8 % (ref 10–15)
GFR SERPL CREATININE-BSD FRML MDRD: 67 ML/MIN/{1.73_M2}
GLUCOSE SERPL-MCNC: 90 MG/DL (ref 70–99)
GLUCOSE UR STRIP-MCNC: NEGATIVE MG/DL
HCT VFR BLD AUTO: 44.8 % (ref 35–47)
HDLC SERPL-MCNC: 76 MG/DL
HGB BLD-MCNC: 14.6 G/DL (ref 11.7–15.7)
HGB UR QL STRIP: ABNORMAL
KETONES UR STRIP-MCNC: NEGATIVE MG/DL
LDLC SERPL CALC-MCNC: 82 MG/DL
LEUKOCYTE ESTERASE UR QL STRIP: ABNORMAL
MCH RBC QN AUTO: 29.9 PG (ref 26.5–33)
MCHC RBC AUTO-ENTMCNC: 32.6 G/DL (ref 31.5–36.5)
MCV RBC AUTO: 92 FL (ref 78–100)
NITRATE UR QL: NEGATIVE
NON-SQ EPI CELLS #/AREA URNS LPF: ABNORMAL /LPF
NONHDLC SERPL-MCNC: 99 MG/DL
PH UR STRIP: 7 PH (ref 5–7)
PLATELET # BLD AUTO: 279 10E9/L (ref 150–450)
POTASSIUM SERPL-SCNC: 3.7 MMOL/L (ref 3.4–5.3)
PROT SERPL-MCNC: 7.4 G/DL (ref 6.8–8.8)
RBC # BLD AUTO: 4.88 10E12/L (ref 3.8–5.2)
RBC #/AREA URNS AUTO: ABNORMAL /HPF
SODIUM SERPL-SCNC: 136 MMOL/L (ref 133–144)
SOURCE: ABNORMAL
SP GR UR STRIP: 1.01 (ref 1–1.03)
TRIGL SERPL-MCNC: 87 MG/DL
TSH SERPL DL<=0.005 MIU/L-ACNC: 1.1 MU/L (ref 0.4–4)
UROBILINOGEN UR STRIP-ACNC: 0.2 EU/DL (ref 0.2–1)
WBC # BLD AUTO: 6.2 10E9/L (ref 4–11)
WBC #/AREA URNS AUTO: >100 /HPF
WBC CLUMPS #/AREA URNS HPF: PRESENT /HPF

## 2021-04-16 PROCEDURE — 85027 COMPLETE CBC AUTOMATED: CPT | Performed by: INTERNAL MEDICINE

## 2021-04-16 PROCEDURE — 81001 URINALYSIS AUTO W/SCOPE: CPT | Performed by: INTERNAL MEDICINE

## 2021-04-16 PROCEDURE — 84443 ASSAY THYROID STIM HORMONE: CPT | Performed by: INTERNAL MEDICINE

## 2021-04-16 PROCEDURE — 80061 LIPID PANEL: CPT | Performed by: INTERNAL MEDICINE

## 2021-04-16 PROCEDURE — 87086 URINE CULTURE/COLONY COUNT: CPT | Performed by: INTERNAL MEDICINE

## 2021-04-16 PROCEDURE — 36415 COLL VENOUS BLD VENIPUNCTURE: CPT | Performed by: INTERNAL MEDICINE

## 2021-04-16 PROCEDURE — 80053 COMPREHEN METABOLIC PANEL: CPT | Performed by: INTERNAL MEDICINE

## 2021-04-16 RX ORDER — CIPROFLOXACIN 500 MG/1
500 TABLET, FILM COATED ORAL 2 TIMES DAILY
Qty: 14 TABLET | Refills: 0 | Status: SHIPPED | OUTPATIENT
Start: 2021-04-16 | End: 2021-05-24

## 2021-04-17 LAB
BACTERIA SPEC CULT: NORMAL
Lab: NORMAL
SPECIMEN SOURCE: NORMAL

## 2021-04-19 ENCOUNTER — TELEPHONE (OUTPATIENT)
Dept: OBGYN | Facility: CLINIC | Age: 76
End: 2021-04-19

## 2021-04-19 NOTE — TELEPHONE ENCOUNTER
The patient called me over the weekend with regards to follow-up urine analysis which showed pyuria squamous cells and multiple bacteria.  I reviewed her past history and advised waiting for the urine culture results back prior to starting antibiotics.  Her urine culture is returned is negative and I advised the patient of this result.  She will follow up as per our previous notes  Xavier Austin MD FACOG

## 2021-04-27 ENCOUNTER — OFFICE VISIT (OUTPATIENT)
Dept: OBGYN | Facility: CLINIC | Age: 76
End: 2021-04-27
Payer: COMMERCIAL

## 2021-04-27 VITALS — SYSTOLIC BLOOD PRESSURE: 120 MMHG | DIASTOLIC BLOOD PRESSURE: 66 MMHG | WEIGHT: 186 LBS | BODY MASS INDEX: 30.72 KG/M2

## 2021-04-27 DIAGNOSIS — Z88.9 DRUG ALLERGY: Primary | ICD-10-CM

## 2021-04-27 DIAGNOSIS — R41.3 MEMORY LOSS: ICD-10-CM

## 2021-04-27 DIAGNOSIS — R82.81 PYURIA, STERILE: ICD-10-CM

## 2021-04-27 PROCEDURE — 99213 OFFICE O/P EST LOW 20 MIN: CPT | Performed by: OBSTETRICS & GYNECOLOGY

## 2021-04-27 NOTE — NURSING NOTE
"Chief Complaint   Patient presents with     RECHECK       Initial /66   Wt 84.4 kg (186 lb)   LMP 1997   BMI 30.72 kg/m   Estimated body mass index is 30.72 kg/m  as calculated from the following:    Height as of 21: 1.657 m (5' 5.25\").    Weight as of this encounter: 84.4 kg (186 lb).  BP completed using cuff size: regular    Questioned patient about current smoking habits.  Pt. has never smoked.          The following HM Due: NONE      The following patient reported/Care Every where data was sent to:  P ABSTRACT QUALITY INITIATIVES [[id number]]        Yolande Lynn [facility]                 "

## 2021-04-27 NOTE — PATIENT INSTRUCTIONS
You can reach your [facility] Care Team any time of the day by calling [phone]. This number will put you in touch with the 24 hour nurse line if the clinic is closed.    To contact your OB/GYN Station Coordinator/Surgery Scheduler please call [phone]. This is a direct number for your care team between 8 a.m. and 4 p.m. Monday through Friday.    [facility] is open for your convenience:  Monday through Friday 8 a.m. to 6 p.m.  Closed weekends and all major holidays.

## 2021-04-28 PROBLEM — R82.81 PYURIA, STERILE: Status: ACTIVE | Noted: 2021-04-28

## 2021-04-28 NOTE — PROGRESS NOTES
Kierra Mcmullen is a 76 year old female  postmenopausal not on hormone replacement therapy presents today for a follow-up of a recent UTI.  The patient has a history of recurrent pyuria that has been sterile.  Recently she did have a culture that came back positive for Enterobacter.  The patient was symptomatic and noticed pelvic pressure at the time of that infection.  She had a follow-up urinalysis done on 2021 that was positive but the culture was negative.  I also reviewed the results of her comprehensive metabolic panel her lipid panel TSH results with her.  We had a lengthy discussion today regarding sterile pyuria and the risk benefits and alternative forms of therapy.  In the past done routine periodic urine cultures all of which are returned negative.  We discussed the signs and symptoms of infection and if she does have symptoms or concerns we can bring her in for a urinalysis and urine culture with appropriate therapy to follow.  The patient and her  have a good understanding.  Their questions have been answered informed consents been obtained.  The patient has a family history of Alzheimer's and is wondering if perhaps she has some early symptoms to suggest memory loss.  The patient states that she has a hard time remembering names which is something different which she has had in the past.  No other significant memory issues.  We discussed dementia Alzheimer's and memory concerns.  After this discussion we made a decision to proceed with a neurology consult for further evaluation.  Third concern that she had was a question of a sulfa allergy.  This became an issue on her recent urine culture with organisms that were resistant to many of the standard antibiotic treatments.  The patient does not recall a specific reaction to sulfa but is wondering if there could be an allergy.  If there is no allergy she like to have this removed from her chart.  We discussed having her see an allergist  for antibiotic sensitivity testing and referral was done today    Past Medical History:   Diagnosis Date     Adenomatous polyp of colon 5/2011    needs conolonospy 5/2016     Arthritis 2005     Hypothyroidism     Abstracted 7/16/02     Osteopenia      Current Outpatient Medications   Medication     Biotin 5000 MCG CAPS     CALCIUM + D 600-200 MG-IU OR TABS     calcium polycarbophil (FIBERCON) 625 MG tablet     ciprofloxacin (CIPRO) 500 MG tablet     COMPOUNDED NON-CONTROLLED SUBSTANCE (CMPD RX) - PHARMACY TO MIX COMPOUNDED MEDICATION     GLUCOSAMINE CHONDROITIN COMPLX PO     levothyroxine (SYNTHROID/LEVOTHROID) 100 MCG tablet     MULTI-DAY OR TABS     No current facility-administered medications for this visit.      Past Surgical History:   Procedure Laterality Date     ARTHROPLASTY KNEE Left 5/19/2015    Procedure: ARTHROPLASTY KNEE;  Surgeon: Daniel Mack MD;  Location:  OR     CHOLECYSTECTOMY       COLONOSCOPY N/A 5/5/2016    Procedure: COLONOSCOPY;  Surgeon: Sage Berg MD;  Location:  GI     CYSTOSCOPY  1/31/2012    Procedure:CYSTOSCOPY; Surgeon:GAMA GRIDER; Location: OR     DAVINCI HYSTERECTOMY SUPRACERVICAL, SACROCOLPOPEXY, COMBINED  1/31/2012    Procedure:COMBINED DAVINCI HYSTERECTOMY SUPRACERVICAL, SACROCOLPOPEXY; DAVINCI LAPAROSCOPIC SUPRACERVICAL HYSTERECTOMY, BILATERAL SALPINGO-OOPHORECTOMY, SACROCOLPOPEXY  WITH COLOPLASTY MESH AND CYSTOPLASTY; Surgeon:GAMA GRIDER; Location: OR     HC COLONOSCOPY THRU STOMA, DIAGNOSTIC  2006      REMOVAL OF TONSILS,<11 Y/O      Tonsils <12y.o.     HC TOOTH EXTRACTION W/FORCEP       ZZC NONSPECIFIC PROCEDURE      Cholecystectomy -- Abstracted 7/16/02     /66   Wt 84.4 kg (186 lb)   LMP 07/22/1997   BMI 30.72 kg/m    Constitutional: healthy, alert and no distress    (Z88.9) Drug allergy  (primary encounter diagnosis)  Comment: Allergy referral given  Plan: ALLERGY/ASTHMA ADULT REFERRAL        done    (R41.3) Memory  loss  Comment: See above discussion  Plan: NEUROLOGY ADULT REFERRAL        Referral given    (R84.10) Pyuria, sterile  Comment: Urine culture is negative at this time.  Patient is asymptomatic.  She does have a small recurrent cystocele we discussed methodologies to assure adequate emptying to minimize risk of future infection.  The patient is otherwise asymptomatic and does not desire any further therapy at this time.  She will call for concerns  Plan: Written plan reviewed with the patient and her

## 2021-05-10 ENCOUNTER — TRANSFERRED RECORDS (OUTPATIENT)
Dept: HEALTH INFORMATION MANAGEMENT | Facility: CLINIC | Age: 76
End: 2021-05-10

## 2021-05-14 DIAGNOSIS — Z11.59 ENCOUNTER FOR SCREENING FOR OTHER VIRAL DISEASES: ICD-10-CM

## 2021-05-18 ENCOUNTER — TRANSFERRED RECORDS (OUTPATIENT)
Dept: HEALTH INFORMATION MANAGEMENT | Facility: CLINIC | Age: 76
End: 2021-05-18

## 2021-05-28 DIAGNOSIS — Z11.59 ENCOUNTER FOR SCREENING FOR OTHER VIRAL DISEASES: ICD-10-CM

## 2021-05-28 LAB
LABORATORY COMMENT REPORT: NORMAL
SARS-COV-2 RNA RESP QL NAA+PROBE: NEGATIVE
SARS-COV-2 RNA RESP QL NAA+PROBE: NORMAL
SPECIMEN SOURCE: NORMAL
SPECIMEN SOURCE: NORMAL

## 2021-05-28 PROCEDURE — U0005 INFEC AGEN DETEC AMPLI PROBE: HCPCS | Performed by: COLON & RECTAL SURGERY

## 2021-05-28 PROCEDURE — U0003 INFECTIOUS AGENT DETECTION BY NUCLEIC ACID (DNA OR RNA); SEVERE ACUTE RESPIRATORY SYNDROME CORONAVIRUS 2 (SARS-COV-2) (CORONAVIRUS DISEASE [COVID-19]), AMPLIFIED PROBE TECHNIQUE, MAKING USE OF HIGH THROUGHPUT TECHNOLOGIES AS DESCRIBED BY CMS-2020-01-R: HCPCS | Performed by: COLON & RECTAL SURGERY

## 2021-06-01 ENCOUNTER — HOSPITAL ENCOUNTER (OUTPATIENT)
Facility: CLINIC | Age: 76
Discharge: HOME | End: 2021-06-01
Attending: COLON & RECTAL SURGERY | Admitting: COLON & RECTAL SURGERY
Payer: COMMERCIAL

## 2021-06-01 VITALS
HEART RATE: 56 BPM | RESPIRATION RATE: 18 BRPM | SYSTOLIC BLOOD PRESSURE: 104 MMHG | TEMPERATURE: 96.8 F | OXYGEN SATURATION: 96 % | DIASTOLIC BLOOD PRESSURE: 62 MMHG

## 2021-06-01 LAB — COLONOSCOPY: NORMAL

## 2021-06-01 PROCEDURE — 45385 COLONOSCOPY W/LESION REMOVAL: CPT | Performed by: COLON & RECTAL SURGERY

## 2021-06-01 PROCEDURE — 88305 TISSUE EXAM BY PATHOLOGIST: CPT | Mod: TC | Performed by: COLON & RECTAL SURGERY

## 2021-06-01 PROCEDURE — 88305 TISSUE EXAM BY PATHOLOGIST: CPT | Mod: 26 | Performed by: PATHOLOGY

## 2021-06-01 PROCEDURE — G0500 MOD SEDAT ENDO SERVICE >5YRS: HCPCS | Performed by: COLON & RECTAL SURGERY

## 2021-06-01 PROCEDURE — 250N000011 HC RX IP 250 OP 636: Performed by: COLON & RECTAL SURGERY

## 2021-06-01 PROCEDURE — 99153 MOD SED SAME PHYS/QHP EA: CPT | Performed by: COLON & RECTAL SURGERY

## 2021-06-01 RX ORDER — NALOXONE HYDROCHLORIDE 0.4 MG/ML
0.4 INJECTION, SOLUTION INTRAMUSCULAR; INTRAVENOUS; SUBCUTANEOUS
Status: DISCONTINUED | OUTPATIENT
Start: 2021-06-01 | End: 2021-06-01 | Stop reason: HOSPADM

## 2021-06-01 RX ORDER — NALOXONE HYDROCHLORIDE 0.4 MG/ML
0.2 INJECTION, SOLUTION INTRAMUSCULAR; INTRAVENOUS; SUBCUTANEOUS
Status: DISCONTINUED | OUTPATIENT
Start: 2021-06-01 | End: 2021-06-01 | Stop reason: HOSPADM

## 2021-06-01 RX ORDER — ONDANSETRON 4 MG/1
4 TABLET, ORALLY DISINTEGRATING ORAL EVERY 6 HOURS PRN
Status: DISCONTINUED | OUTPATIENT
Start: 2021-06-01 | End: 2021-06-01 | Stop reason: HOSPADM

## 2021-06-01 RX ORDER — LIDOCAINE 40 MG/G
CREAM TOPICAL
Status: DISCONTINUED | OUTPATIENT
Start: 2021-06-01 | End: 2021-06-01 | Stop reason: HOSPADM

## 2021-06-01 RX ORDER — FENTANYL CITRATE 50 UG/ML
INJECTION, SOLUTION INTRAMUSCULAR; INTRAVENOUS PRN
Status: COMPLETED | OUTPATIENT
Start: 2021-06-01 | End: 2021-06-01

## 2021-06-01 RX ORDER — ONDANSETRON 2 MG/ML
4 INJECTION INTRAMUSCULAR; INTRAVENOUS
Status: DISCONTINUED | OUTPATIENT
Start: 2021-06-01 | End: 2021-06-01 | Stop reason: HOSPADM

## 2021-06-01 RX ORDER — FLUMAZENIL 0.1 MG/ML
0.2 INJECTION, SOLUTION INTRAVENOUS
Status: DISCONTINUED | OUTPATIENT
Start: 2021-06-01 | End: 2021-06-01 | Stop reason: HOSPADM

## 2021-06-01 RX ORDER — PROCHLORPERAZINE MALEATE 5 MG
5 TABLET ORAL EVERY 6 HOURS PRN
Status: DISCONTINUED | OUTPATIENT
Start: 2021-06-01 | End: 2021-06-01 | Stop reason: HOSPADM

## 2021-06-01 RX ORDER — ONDANSETRON 2 MG/ML
4 INJECTION INTRAMUSCULAR; INTRAVENOUS EVERY 6 HOURS PRN
Status: DISCONTINUED | OUTPATIENT
Start: 2021-06-01 | End: 2021-06-01 | Stop reason: HOSPADM

## 2021-06-01 RX ADMIN — MIDAZOLAM 1 MG: 1 INJECTION INTRAMUSCULAR; INTRAVENOUS at 07:44

## 2021-06-01 RX ADMIN — FENTANYL CITRATE 100 MCG: 50 INJECTION, SOLUTION INTRAMUSCULAR; INTRAVENOUS at 07:44

## 2021-06-01 NOTE — DISCHARGE INSTRUCTIONS
The patient has received a copy of the Provation  report the doctor has written and discharge instructions have been discussed with the patient and responsible adult.  All questions were addressed and answered prior to patient discharge.      Understanding Colon and Rectal Polyps     The colon has a smooth lining composed of millions of cells.     The colon (also called the large intestine) is a muscular tube that forms the last part of the digestive tract. It absorbs water and stores food waste. The colon is about 4 to 6 feet long. The rectum is the last 6 inches of the colon. The colon and rectum have a smooth lining composed of millions of cells. Changes in these cells can lead to growths in the colon that can become cancerous and should be removed.     When the Colon Lining Changes  Changes that occur in the cells that line the colon or rectum can lead to growths called polyps. Over a period of years, polyps can turn cancerous. Removing polyps early may prevent cancer from ever forming.      Polyps  Polyps are fleshy clumps of tissue that form on the lining of the colon or rectum. Small polyps are usually benign (not cancerous). However, over time, cells in a polyp can change and become cancerous. The larger a polyp grows, the more likely this is to happen. Also, certain types of polyps known as adenomatous polyps are considered premalignant. This means that they will almost always become cancerous if they re not removed.          Cancer  Almost all colorectal cancers start when polyp cells begin growing abnormally. As a cancerous tumor grows, it may involve more and more of the colon or rectum. In time, cancer can also grow beyond the colon or rectum and spread to nearby organs or to glands called lymph nodes. The cells can also travel to other parts of the body. This is known as metastasis. The earlier a cancerous tumor is removed, the better the chance of preventing its spread.        [id number] [employer]  [employer], [address] [place] [address]. All rights reserved. This information is not intended as a substitute for professional medical care. Always follow your healthcare professional's instructions.      Understanding Diverticulosis and Diverticulitis     Pouches or diverticula usually occur in the lower part of the colon called the sigmoid.      Diverticulitis occurs when the pouches become inflamed.     The colon (large intestine) is the last part of the digestive tract. It absorbs water from stool and changes it from a liquid to a solid. In certain cases, small pouches called diverticula can form in the colon wall. This condition is called diverticulosis. The pouches can become infected. If this happens, it becomes a more serious problem called diverticulitis. These problems can be painful. But they can be managed.   Managing Your Condition  Diet changes or taking medications are often tried first. These may be enough to bring relief. If the case is bad, surgery may be done. You and your doctor can discuss the plan that is best for you.  If You Have Diverticulosis  Diet changes are often enough to control symptoms. The main changes are adding fiber (roughage) and drinking more water. Fiber absorbs water as it travels through your colon. This helps your stool stay soft and move smoothly. Water helps this process. If needed, you may be told to take over-the-counter stool softeners. To help relieve pain, antispasmodic medications may be prescribed.  If You Have Diverticulitis  Treatment depends on how bad your symptoms are.  For mild symptoms: You may be put on a liquid diet for a short time. You may also be prescribed antibiotics. If these two steps relieve your symptoms, you may then be prescribed a high-fiber diet. If you still have symptoms, your doctor will discuss further treatment options with you.  For severe symptoms: You may need to be admitted to the hospital. There, you can be given IV  antibiotics and fluids. Once symptoms are under control, the above treatments may be tried. If these don t control your condition, your doctor may discuss the option of having surgery with you.  [place] to Colon Health  Help keep your colon healthy with a diet that includes plenty of high-fiber fruits, vegetables, and whole grains. Drink plenty of liquids like water and juice. Your doctor may also recommend avoiding seeds and nuts.          [id number] [employer] [facility], [address] [place] [address], [place] [address]. All rights reserved. This information is not intended as a substitute for professional medical care. Always follow your healthcare professional's instructions.      HIGH FIBER DIET  Fiber is present in all fruits, vegetables, cereals and grains. Fiber passes through the body undigested. A high fiber diet helps food move through the intestinal tract. The added bulk is helpful in preventing constipation. In people with diverticulosis it serves to clean out the pouches along the colon wall while preventing new ones from forming. A high fiber diet also reduces the risk of colon cancer, decreases blood cholesterol and prevents high blood sugar in people with diabetes.    The foods listed below are high in fiber and should be included in your diet. If you are not used to high fiber foods, start with 1 or 2 foods from this list. Every 3-4 days add a new one to your diet until you are eating 4 high fiber foods per day. This should give you 20-35 Gm of fiber/day. It is also important to drink a lot of water when you are on this diet (6-8 glasses a day). Water causes the fiber to swell and increases the benefit.    FOODS HIGH IN DIETARY FIBER:  BREADS: Made with 100% whole wheat flour; lala, wheat or rye crackers; tortillas, bran muffins  CEREALS: Whole grain cereal with bran (Chex, Raisin Bran, Corn Bran), oatmeal, rolled oats, granola, wheat flakes, brown rice  NUTS: Any nuts  FRUITS: All fresh fruits along with edible  skins, (bananas, citrus fruit, mangoes, pears, prunes, raisins, apples, pineapple, apricot, melon, jams and marmalades), fruit juices (especially prune juice)  VEGETABLES: All types, preferably raw or lightly cooked: especially, celery, eggplant, potatoes, spinach, broccoli, brussel sprouts, winter squash, carrots, cauliflower, soybeans, lentils, fresh and dried beans of all kinds  OTHER: Popcorn, any spices      [id number] [employer] [facility], [address] [place] [address]. All rights reserved. This information is not intended as a substitute for professional medical care. Always follow your healthcare professional's instructions.      Eating a High-Fiber Diet  Fiber is what gives strength and structure to plants. Most grains, beans, vegetables, and fruits contain fiber. Foods rich in fiber are often low in calories and fat, and they fill you up more. They may also reduce your risks for certain health problems. To find out the amount of fiber in canned, packaged, or frozen foods, read the  Nutrition Facts  label. It tells you how much fiber is in a serving.      Types of Fiber and Their Benefits  There are two types of fiber: insoluble and soluble. They both aid digestion and help you maintain a healthy weight.  Insoluble fiber: This is found in whole grains, cereals, certain fruits and vegetables (such as apple skin, corn, and carrots). Insoluble fiber may prevent constipation and reduce the risk of certain types of cancer.   Soluble fiber: This type of fiber is in oats, beans, and certain fruits and vegetables (such as strawberries and peas). Soluble fiber can reduce cholesterol (which may help lower the risk of heart disease), and helps control blood sugar levels.  Look for High-Fiber Foods  Whole-grain breads and cereals: Try to eat 6-8 ounces a day. Include wheat and oat bran cereals, whole-wheat muffins or toast, and corn tortillas in your meals.  Fruits: Try to eat 2 cups a day. Apples, oranges,  strawberries, pears, and bananas are good sources. (Note: Fruit juice is low in fiber.)  Vegetables: Try to eat 3 cups a day. Add asparagus, carrots, broccoli, peas, and corn to your meals.  Legumes (beans): One cup of cooked lentils gives you over 15 grams of fiber. Try navy beans, lentils, and chickpeas.  Seeds:  A small handful of seeds gives you about 3 grams of fiber. Try sunflower seeds.    Keep Track of Your Fiber  A healthy diet includes 31 grams of fiber a day if you have a 2,000-calorie diet. Keep track of how much fiber you eat. Start by reading food labels. Then eat a variety of foods high in fiber. Ask your doctor about supplemental fiber products.            [id number] [employer], [address] [place] [address], [place] [address]. All rights reserved. This information is not intended as a substitute for professional medical care. Always follow your healthcare professional's instructions.

## 2021-06-02 LAB — COPATH REPORT: NORMAL

## 2021-06-04 ENCOUNTER — HOSPITAL ENCOUNTER (OUTPATIENT)
Dept: MAMMOGRAPHY | Facility: CLINIC | Age: 76
Discharge: HOME | End: 2021-06-04
Attending: INTERNAL MEDICINE | Admitting: INTERNAL MEDICINE
Payer: COMMERCIAL

## 2021-06-04 DIAGNOSIS — Z12.31 VISIT FOR SCREENING MAMMOGRAM: ICD-10-CM

## 2021-06-04 PROCEDURE — 77063 BREAST TOMOSYNTHESIS BI: CPT

## 2021-06-14 ENCOUNTER — ANCILLARY PROCEDURE (OUTPATIENT)
Dept: BONE DENSITY | Facility: CLINIC | Age: 76
End: 2021-06-14
Attending: INTERNAL MEDICINE
Payer: COMMERCIAL

## 2021-06-14 DIAGNOSIS — Z78.0 MENOPAUSE: ICD-10-CM

## 2021-06-14 PROCEDURE — 77085 DXA BONE DENSITY AXL VRT FX: CPT | Performed by: INTERNAL MEDICINE

## 2021-07-29 ENCOUNTER — LAB (OUTPATIENT)
Dept: LAB | Facility: CLINIC | Age: 76
End: 2021-07-29
Payer: COMMERCIAL

## 2021-07-29 ENCOUNTER — TRANSFERRED RECORDS (OUTPATIENT)
Dept: HEALTH INFORMATION MANAGEMENT | Facility: CLINIC | Age: 76
End: 2021-07-29

## 2021-07-29 DIAGNOSIS — R41.3 MEMORY LOSS: ICD-10-CM

## 2021-07-29 DIAGNOSIS — E03.9 HYPOTHYROID: ICD-10-CM

## 2021-07-29 LAB
FOLATE SERPL-MCNC: 40.6 NG/ML
VIT B12 SERPL-MCNC: 399 PG/ML (ref 193–986)

## 2021-07-29 PROCEDURE — 82746 ASSAY OF FOLIC ACID SERUM: CPT

## 2021-07-29 PROCEDURE — 36415 COLL VENOUS BLD VENIPUNCTURE: CPT

## 2021-07-29 PROCEDURE — 83090 ASSAY OF HOMOCYSTEINE: CPT | Performed by: PSYCHIATRY & NEUROLOGY

## 2021-07-29 PROCEDURE — 83921 ORGANIC ACID SINGLE QUANT: CPT

## 2021-07-29 PROCEDURE — 82607 VITAMIN B-12: CPT

## 2021-07-30 ENCOUNTER — TRANSFERRED RECORDS (OUTPATIENT)
Dept: HEALTH INFORMATION MANAGEMENT | Facility: CLINIC | Age: 76
End: 2021-07-30

## 2021-08-04 LAB — HCYS SERPL-SCNC: 9.7 UMOL/L (ref 4–12)

## 2021-08-05 LAB — METHYLMALONATE SERPL-SCNC: 0.29 UMOL/L (ref 0–0.4)

## 2021-09-04 ENCOUNTER — HEALTH MAINTENANCE LETTER (OUTPATIENT)
Age: 76
End: 2021-09-04

## 2021-09-28 ENCOUNTER — TELEPHONE (OUTPATIENT)
Dept: OBGYN | Facility: CLINIC | Age: 76
End: 2021-09-28
Payer: COMMERCIAL

## 2021-09-28 ENCOUNTER — LAB (OUTPATIENT)
Dept: LAB | Facility: CLINIC | Age: 76
End: 2021-09-28
Payer: COMMERCIAL

## 2021-09-28 DIAGNOSIS — R30.0 DYSURIA: ICD-10-CM

## 2021-09-28 DIAGNOSIS — R82.998 BROWN-COLORED URINE: ICD-10-CM

## 2021-09-28 DIAGNOSIS — Z87.440 PERSONAL HISTORY OF URINARY TRACT INFECTION: ICD-10-CM

## 2021-09-28 DIAGNOSIS — Z87.440 PERSONAL HISTORY OF URINARY TRACT INFECTION: Primary | ICD-10-CM

## 2021-09-28 NOTE — TELEPHONE ENCOUNTER
Pt calls to get urine checked. She had a brownish tinge to her urine.  No other sx.    Urine ordered.  Pt will do lab only.      Kiki MONTIEL R.N.

## 2021-09-29 ENCOUNTER — APPOINTMENT (OUTPATIENT)
Dept: LAB | Facility: CLINIC | Age: 76
End: 2021-09-29
Payer: COMMERCIAL

## 2021-09-29 LAB
ALBUMIN UR-MCNC: NEGATIVE MG/DL
APPEARANCE UR: ABNORMAL
BACTERIA #/AREA URNS HPF: ABNORMAL /HPF
BILIRUB UR QL STRIP: NEGATIVE
COLOR UR AUTO: ABNORMAL
GLUCOSE UR STRIP-MCNC: NEGATIVE MG/DL
HGB UR QL STRIP: ABNORMAL
KETONES UR STRIP-MCNC: NEGATIVE MG/DL
LEUKOCYTE ESTERASE UR QL STRIP: ABNORMAL
MUCOUS THREADS #/AREA URNS LPF: PRESENT /LPF
NITRATE UR QL: NEGATIVE
PH UR STRIP: 6 [PH] (ref 5–7)
RBC URINE: 6 /HPF
SP GR UR STRIP: 1.01 (ref 1–1.03)
SQUAMOUS EPITHELIAL: 24 /HPF
TRANSITIONAL EPI: <1 /HPF
UROBILINOGEN UR STRIP-MCNC: NORMAL MG/DL
WBC CLUMPS #/AREA URNS HPF: PRESENT /HPF
WBC URINE: 108 /HPF

## 2021-09-29 PROCEDURE — 81001 URINALYSIS AUTO W/SCOPE: CPT

## 2021-09-29 PROCEDURE — 87086 URINE CULTURE/COLONY COUNT: CPT | Performed by: OBSTETRICS & GYNECOLOGY

## 2021-09-30 ENCOUNTER — TRANSFERRED RECORDS (OUTPATIENT)
Dept: HEALTH INFORMATION MANAGEMENT | Facility: CLINIC | Age: 76
End: 2021-09-30
Payer: COMMERCIAL

## 2021-09-30 NOTE — RESULT ENCOUNTER NOTE
I spoke with the patient about her urine analysis results.  She presently is asymptomatic.  I will await the results of the urine culture with appropriate therapy to follow.  If she has symptoms or concerns in the interval she will call

## 2021-10-01 ENCOUNTER — TELEPHONE (OUTPATIENT)
Dept: OBGYN | Facility: CLINIC | Age: 76
End: 2021-10-01

## 2021-10-01 DIAGNOSIS — N39.0 URINARY TRACT INFECTION WITHOUT HEMATURIA, SITE UNSPECIFIED: Primary | ICD-10-CM

## 2021-10-01 DIAGNOSIS — N39.0 RECURRENT UTI: ICD-10-CM

## 2021-10-01 LAB
BACTERIA UR CULT: ABNORMAL
BACTERIA UR CULT: ABNORMAL

## 2021-10-01 RX ORDER — SULFAMETHOXAZOLE/TRIMETHOPRIM 800-160 MG
1 TABLET ORAL 2 TIMES DAILY
Qty: 14 TABLET | Refills: 0 | Status: SHIPPED | OUTPATIENT
Start: 2021-10-01 | End: 2021-10-08

## 2021-10-01 NOTE — TELEPHONE ENCOUNTER
I spoke to the patient today and reviewed the results of her recent urine urine culture showing 10-50,000 colonies of an organism Serratia marcescens.  I discussed this culture with the patient at length and her .  I reviewed the threshold for a normal UTI but in light of her symptoms I am empirically going to treat her.  I sent a prescription for Septra DS 1 p.o. twice daily for 7 days to the listed [facility].  I will get a follow-up urinalysis and culture in 1 month.  Patient has a history of recurring UTIs in the past.  We discussed asymptomatic chronic bacteriuria in the treatment regimen for this condition.  At present the patient is asymptomatic

## 2021-10-26 ENCOUNTER — LAB (OUTPATIENT)
Dept: LAB | Facility: CLINIC | Age: 76
End: 2021-10-26
Payer: COMMERCIAL

## 2021-10-26 DIAGNOSIS — Z87.440 PERSONAL HISTORY OF URINARY TRACT INFECTION: ICD-10-CM

## 2021-10-26 DIAGNOSIS — R82.998 BROWN-COLORED URINE: ICD-10-CM

## 2021-10-26 LAB
ALBUMIN UR-MCNC: NEGATIVE MG/DL
APPEARANCE UR: ABNORMAL
BACTERIA #/AREA URNS HPF: ABNORMAL /HPF
BILIRUB UR QL STRIP: NEGATIVE
COLOR UR AUTO: ABNORMAL
GLUCOSE UR STRIP-MCNC: NEGATIVE MG/DL
HGB UR QL STRIP: NEGATIVE
KETONES UR STRIP-MCNC: NEGATIVE MG/DL
LEUKOCYTE ESTERASE UR QL STRIP: ABNORMAL
NITRATE UR QL: NEGATIVE
PH UR STRIP: 6.5 [PH] (ref 5–7)
RBC URINE: 4 /HPF
SP GR UR STRIP: 1.01 (ref 1–1.03)
SQUAMOUS EPITHELIAL: 3 /HPF
UROBILINOGEN UR STRIP-MCNC: NORMAL MG/DL
WBC CLUMPS #/AREA URNS HPF: PRESENT /HPF
WBC URINE: 119 /HPF

## 2021-10-26 PROCEDURE — 87086 URINE CULTURE/COLONY COUNT: CPT

## 2021-10-26 PROCEDURE — 81001 URINALYSIS AUTO W/SCOPE: CPT

## 2021-10-27 ENCOUNTER — MYC MEDICAL ADVICE (OUTPATIENT)
Dept: INTERNAL MEDICINE | Facility: CLINIC | Age: 76
End: 2021-10-27

## 2021-10-28 ENCOUNTER — TELEPHONE (OUTPATIENT)
Dept: OBGYN | Facility: CLINIC | Age: 76
End: 2021-10-28

## 2021-10-28 DIAGNOSIS — N30.01 ACUTE CYSTITIS WITH HEMATURIA: Primary | ICD-10-CM

## 2021-10-28 LAB — BACTERIA UR CULT: ABNORMAL

## 2021-10-28 RX ORDER — SULFAMETHOXAZOLE/TRIMETHOPRIM 800-160 MG
1 TABLET ORAL 2 TIMES DAILY
Qty: 28 TABLET | Refills: 0 | Status: SHIPPED | OUTPATIENT
Start: 2021-10-28 | End: 2021-11-11

## 2021-10-28 NOTE — TELEPHONE ENCOUNTER
I spoke to the patient today and reviewed the results of her most recent urine culture showing 100,000 colonies of Serratia marcescens.  I discussed the nature of this organism and previous UTI with 10-50,000 colonies.  This most recent culture had greater than 100,000 colonies of Serratia.  In light of the fact that she is having some mild symptoms and because of her past history I am going to retreat her with Septra DS 1 p.o. twice daily for 14 days.  Earlier she been treated with Septra DS for 7 days.  Sensitivities confirm that the organism is sensitive to Septra DS.  Signs and symptoms of concern were discussed the patient will call if these occur.  Otherwise I will have her do a follow-up urine culture 2 to 4 weeks after finishing the antibiotic

## 2021-11-24 ENCOUNTER — LAB (OUTPATIENT)
Dept: LAB | Facility: CLINIC | Age: 76
End: 2021-11-24
Payer: COMMERCIAL

## 2021-11-24 DIAGNOSIS — N30.01 ACUTE CYSTITIS WITH HEMATURIA: ICD-10-CM

## 2021-11-24 DIAGNOSIS — N30.00 ACUTE CYSTITIS WITHOUT HEMATURIA: ICD-10-CM

## 2021-11-24 LAB
ALBUMIN UR-MCNC: NEGATIVE MG/DL
APPEARANCE UR: ABNORMAL
BACTERIA #/AREA URNS HPF: ABNORMAL /HPF
BILIRUB UR QL STRIP: NEGATIVE
COLOR UR AUTO: YELLOW
GLUCOSE UR STRIP-MCNC: NEGATIVE MG/DL
HGB UR QL STRIP: ABNORMAL
KETONES UR STRIP-MCNC: NEGATIVE MG/DL
LEUKOCYTE ESTERASE UR QL STRIP: ABNORMAL
NITRATE UR QL: NEGATIVE
PH UR STRIP: 5.5 [PH] (ref 5–7)
RBC #/AREA URNS AUTO: ABNORMAL /HPF
SP GR UR STRIP: 1.01 (ref 1–1.03)
SQUAMOUS #/AREA URNS AUTO: ABNORMAL /LPF
UROBILINOGEN UR STRIP-ACNC: 0.2 E.U./DL
WBC #/AREA URNS AUTO: ABNORMAL /HPF
WBC CLUMPS #/AREA URNS HPF: PRESENT /HPF

## 2021-11-24 PROCEDURE — 87086 URINE CULTURE/COLONY COUNT: CPT

## 2021-11-24 PROCEDURE — 81001 URINALYSIS AUTO W/SCOPE: CPT

## 2021-11-25 LAB — BACTERIA UR CULT: NO GROWTH

## 2021-11-30 ENCOUNTER — TELEPHONE (OUTPATIENT)
Dept: OBGYN | Facility: CLINIC | Age: 76
End: 2021-11-30
Payer: COMMERCIAL

## 2021-11-30 DIAGNOSIS — R82.81 PYURIA: Primary | ICD-10-CM

## 2021-11-30 DIAGNOSIS — R31.29 MICROHEMATURIA: ICD-10-CM

## 2021-11-30 RX ORDER — SULFAMETHOXAZOLE/TRIMETHOPRIM 800-160 MG
1 TABLET ORAL 2 TIMES DAILY
Qty: 20 TABLET | Refills: 0 | Status: SHIPPED | OUTPATIENT
Start: 2021-11-30 | End: 2021-12-10

## 2021-11-30 NOTE — TELEPHONE ENCOUNTER
I spoke with the patient today regarding her recent urine analysis showing pyuria and microhematuria. The patient states that she does notice a small amount of blood in her urine. I reviewed her negative FISH and CT urogram from June 2020. The patient did not have a cystoscopy associated with that. I reviewed her previous work-up for recurrent UTIs with Dr. Connell in 2017. At this point I am empirically going to treat the patient with Septra DS 1 p.o. twice daily for 10 days. She will have a repeat urine analysis and culture done 2 weeks after completing therapy. She does have an appointment to see me on December 23, 2021. She and her  are traveling to [place] leaving on December 29 for the winter and would like to have the cystoscopy and urologic consultation done before that time. Could you please help the patient schedule an appointment for cystoscopy for evaluation of microhematuria and recurring UTIs prior to them leaving for [place]

## 2021-12-01 DIAGNOSIS — N39.0 URINARY TRACT INFECTION: Primary | ICD-10-CM

## 2021-12-02 ENCOUNTER — OFFICE VISIT (OUTPATIENT)
Dept: UROLOGY | Facility: CLINIC | Age: 76
End: 2021-12-02
Attending: OBSTETRICS & GYNECOLOGY
Payer: COMMERCIAL

## 2021-12-02 VITALS
SYSTOLIC BLOOD PRESSURE: 118 MMHG | WEIGHT: 183 LBS | DIASTOLIC BLOOD PRESSURE: 78 MMHG | BODY MASS INDEX: 29.41 KG/M2 | HEIGHT: 66 IN

## 2021-12-02 DIAGNOSIS — R82.81 PYURIA: ICD-10-CM

## 2021-12-02 DIAGNOSIS — R31.29 MICROHEMATURIA: ICD-10-CM

## 2021-12-02 DIAGNOSIS — N30.01 ACUTE CYSTITIS WITH HEMATURIA: ICD-10-CM

## 2021-12-02 LAB
ALBUMIN UR-MCNC: NEGATIVE MG/DL
APPEARANCE UR: CLEAR
BILIRUB UR QL STRIP: NEGATIVE
COLOR UR AUTO: YELLOW
GLUCOSE UR STRIP-MCNC: NEGATIVE MG/DL
HGB UR QL STRIP: ABNORMAL
KETONES UR STRIP-MCNC: NEGATIVE MG/DL
LEUKOCYTE ESTERASE UR QL STRIP: ABNORMAL
NITRATE UR QL: NEGATIVE
PH UR STRIP: 6.5 [PH] (ref 5–7)
RESIDUAL VOLUME (RV) (EXTERNAL): 271
SP GR UR STRIP: 1.01 (ref 1–1.03)
UROBILINOGEN UR STRIP-ACNC: 0.2 E.U./DL

## 2021-12-02 PROCEDURE — 99204 OFFICE O/P NEW MOD 45 MIN: CPT | Mod: 25 | Performed by: PHYSICIAN ASSISTANT

## 2021-12-02 PROCEDURE — 51798 US URINE CAPACITY MEASURE: CPT | Performed by: PHYSICIAN ASSISTANT

## 2021-12-02 PROCEDURE — 81003 URINALYSIS AUTO W/O SCOPE: CPT | Mod: QW | Performed by: PHYSICIAN ASSISTANT

## 2021-12-02 ASSESSMENT — ENCOUNTER SYMPTOMS
NAUSEA: 0
HEMATURIA: 1
SHORTNESS OF BREATH: 0
CHILLS: 0
FEVER: 0
DYSURIA: 0
VOMITING: 0
FREQUENCY: 0

## 2021-12-02 ASSESSMENT — PAIN SCALES - GENERAL: PAINLEVEL: NO PAIN (0)

## 2021-12-02 ASSESSMENT — MIFFLIN-ST. JEOR: SCORE: 1336.83

## 2021-12-02 NOTE — PROGRESS NOTES
"Subjective      REQUESTING PROVIDER   Xavier Austin     REASON FOR CONSULT   Persistent microscopic hematuria  Gross hematuria--\"tinge in urine\"    HISTORY OF PRESENT ILLNESS   Ms. Mcmullen is a 76 year old female seen today in the urology clinic for evaluation of her hematuria. Patient had noted a small amount of blood in her urine. She does feel that her urine is tinged with color. She did have brown-colored urine, but no other symptoms at one point. Urine culture at that time showed Serratia marcescens. He was treated with Bactrim. After treatment of urinary tract infection, she continues to have persistent microscopic hematuria with a negative urine culture.    Patient has been treated for recurrent urinary tract infections. She previously saw Dr. Connell and underwent a cystoscopy in 2017 which was normal. She was started on vaginal estrogen cream at that time. It was felt that she does have a degree of colonization that was causing infections. She has been following with Dr. Austin. She did have a fish test which was negative in 2020. She also had a CT urogram in June 2020 which showed no obvious abnormalities.    She did note some foul-smelling urine around the time that she had a positive culture. She is currently on Bactrim. Initial postvoid residuals 271 today. She feels that she did not empty her bladder and went to the bathroom again had a postvoid residual of 26 mL.    Patient denies any history of nephrolithiasis or flank pain. She is a former smoker having quit in 1988. She smoked for 20 years, 1 pack/day. No significant exposure. She does not take any blood thinning medications.  Mild urgency noted.    She also endorses that she has a degree of prolapse. She does not have to splint to urinate, but wonders if this is contributing to some of her difficulties. She does note that some of her friends on suppressive antibiotics in the form of Macrobid or trimethoprim.    Retention or clots: No    Hematuria " Risk Factors:  Age >40: Yes   Smoking history: Yes  Occupational exposure to chemicals or dyes (ie, benzenes, aromatic amines): no  History of urologic disorder or disease: no  History of irritative voiding symptoms: mild urgency  History of urinary tract infection: yes  Analgesic abuse: no  History of pelvic irradiation: no    The following portions of the patient's history were reviewed and updated as appropriate: allergies, current medications, past family history, past medical history, past social history, past surgical history and problem list.     REVIEW OF SYSTEMS   Review of Systems   Constitutional: Negative for chills and fever.   Respiratory: Negative for shortness of breath.    Cardiovascular: Negative for chest pain.   Gastrointestinal: Negative for nausea and vomiting.   Genitourinary: Positive for hematuria and urgency. Negative for dysuria and frequency.      Per HPI.     Patient Active Problem List   Diagnosis     Obesity     Prolapse of vaginal wall     Recurrent UTI     Advance Care Planning     Postmenopausal bleeding     Cystocele     Enterocele     Hypothyroidism     Osteopenia     Adenomatous polyp of colon     Degenerative arthritis of knee     Pyuria, sterile      Past Medical History:   Diagnosis Date     Adenomatous polyp of colon 5/2011    needs conolonospy 5/2016     Arthritis 2005     Hypothyroidism     Abstracted 7/16/02     Osteopenia       Past Surgical History:   Procedure Laterality Date     ARTHROPLASTY KNEE Left 5/19/2015    Procedure: ARTHROPLASTY KNEE;  Surgeon: Daniel Mack MD;  Location:  OR     CHOLECYSTECTOMY       COLONOSCOPY N/A 5/5/2016    Procedure: COLONOSCOPY;  Surgeon: Sage Berg MD;  Location:  GI     CYSTOSCOPY  1/31/2012    Procedure:CYSTOSCOPY; Surgeon:GAMA GRIDER; Location: OR     DAVINCI HYSTERECTOMY SUPRACERVICAL, SACROCOLPOPEXY, COMBINED  1/31/2012    Procedure:COMBINED DAVINCI HYSTERECTOMY SUPRACERVICAL, SACROCOLPOPEXY; ABIMAELINCI  "LAPAROSCOPIC SUPRACERVICAL HYSTERECTOMY, BILATERAL SALPINGO-OOPHORECTOMY, SACROCOLPOPEXY  WITH COLOPLASTY MESH AND CYSTOPLASTY; Surgeon:GAMA GRIDER; Location:[facility] OR     HC REMOVAL OF TONSILS,<13 Y/O      Tonsils <12y.o.     HC TOOTH EXTRACTION W/FORCEP       ZZC NONSPECIFIC PROCEDURE      Cholecystectomy -- Abstracted 7/16/02     ZZHC COLONOSCOPY THRU STOMA, DIAGNOSTIC  2006      Social History:   .  Former smoker.  Quit in 1988.  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  Retired teacher.    Family History:   No known family history of nephrolithiasis or  malignancy.    Objective      PHYSICAL EXAM   /78   Ht 1.676 m (5' 6\")   Wt 83 kg (183 lb)   LMP 07/22/1997   BMI 29.54 kg/m     Physical Exam  Constitutional:       Appearance: Normal appearance.   HENT:      Head: Normocephalic.      Nose: Nose normal.   Eyes:      General: No scleral icterus.  Pulmonary:      Effort: Pulmonary effort is normal.   Abdominal:      General: There is no distension.   Musculoskeletal:         General: Normal range of motion.   Skin:     Findings: No rash.   Neurological:      General: No focal deficit present.      Mental Status: She is alert and oriented to person, place, and time.   Psychiatric:         Mood and Affect: Mood normal.         Behavior: Behavior normal.        LABORATORY   Recent Labs   Lab Test 12/02/21  1326 11/24/21  1415   COLOR Yellow Yellow   APPEARANCE Clear Slightly Cloudy*   URINEGLC Negative Negative   URINEBILI Negative Negative   URINEKETONE Negative Negative   SG 1.010 1.015   UBLD Trace* Small*   URINEPH 6.5 5.5   PROTEIN Negative Negative   UROBILINOGEN 0.2 0.2   NITRITE Negative Negative   LEUKEST Moderate* Moderate*   RBCU  --  5-10*   WBCU  --  *     Urine culture 11/24/21: No growth  Urine culture 10/26/21: Serratia marcescens    Assessment & Plan    1. Pyuria    2. Microhematuria    3. Urinary tract infection      I had the pleasure today of meeting with Ms. Mcmullen to discuss her " hematuria.  The differential diagnosis at this point includes stone disease, infection, urothelial malignancy, renal disorder versus another yet unknown diagnosis.  Many times, we do not find a cause, but we should rule out bad causes.    At this time, recommend proceeding with comprehensive hematuria evaluation to include:  - Urine cytology to look for cells concerning for malignancy--will likely obtain at time of cystoscopy.  - CT urogram for upper tract imaging. We did discuss that she had recent imaging 1.5 years ago that had no concerning findings. We could hold off on CT imaging at this time, but it would be reasonable for repeat CT imaging. Patient would feel more comfortable repeat CT urogram given the visible blood.    - Cystoscopy with the first available urologist to evaluate the interior of the bladder. Follow up for hematuria as recommended by urologist performing cystoscopic evaluation.    -Cystoscopic examination will be done with Dr. Fierro next week. We did discuss that she may be able to perform pelvic examination and comment on the degree of prolapse at that time. We briefly discussed a pessary. She is not particularly interested in this. Given the degree, she may benefit from pelvic floor physical therapy, in addition to continuing with vaginal estrogen cream.    Continue with Bactrim to cystoscopy.    Signed by:     Jenna Nguyen PA-C 12/2/2021 2:49 PM

## 2021-12-02 NOTE — PATIENT INSTRUCTIONS
- Urine cytology to look for abnormal cells.  - CT scan: Please call [phone] to schedule this at the [facility] ([place]) or [facility] ([place]).   - Cystoscopy with the urologist to evaluate the interior of the bladder. Follow up as recommended by the urologist.    Exam and discussion with Dr. Fierro about prolapse.    Continue with Bactrim to cystoscopy.

## 2021-12-02 NOTE — LETTER
"12/2/2021       RE: Kierra Mcmullen  [address] [place] [address]     Dear Colleague,    Thank you for referring your patient, Kierra Mcmullen, to the [facility]OLOGY [facility] [place] at [facility]. Please see a copy of my visit note below.    Subjective      REQUESTING PROVIDER   Xavier Austin     REASON FOR CONSULT   Persistent microscopic hematuria  Gross hematuria--\"tinge in urine\"    HISTORY OF PRESENT ILLNESS   Ms. Mcmullen is a 76 year old female seen today in the urology clinic for evaluation of her hematuria. Patient had noted a small amount of blood in her urine. She does feel that her urine is tinged with color. She did have brown-colored urine, but no other symptoms at one point. Urine culture at that time showed Serratia marcescens. He was treated with Bactrim. After treatment of urinary tract infection, she continues to have persistent microscopic hematuria with a negative urine culture.    Patient has been treated for recurrent urinary tract infections. She previously saw Dr. Connell and underwent a cystoscopy in 2017 which was normal. She was started on vaginal estrogen cream at that time. It was felt that she does have a degree of colonization that was causing infections. She has been following with Dr. Austin. She did have a fish test which was negative in 2020. She also had a CT urogram in June 2020 which showed no obvious abnormalities.    She did note some foul-smelling urine around the time that she had a positive culture. She is currently on Bactrim. Initial postvoid residuals 271 today. She feels that she did not empty her bladder and went to the bathroom again had a postvoid residual of 26 mL.    Patient denies any history of nephrolithiasis or flank pain. She is a former smoker having quit in 1988. She smoked for 20 years, 1 pack/day. No significant exposure. She does not take any blood thinning medications.  Mild " urgency noted.    She also endorses that she has a degree of prolapse. She does not have to splint to urinate, but wonders if this is contributing to some of her difficulties. She does note that some of her friends on suppressive antibiotics in the form of Macrobid or trimethoprim.    Retention or clots: No    Hematuria Risk Factors:  Age >40: Yes   Smoking history: Yes  Occupational exposure to chemicals or dyes (ie, benzenes, aromatic amines): no  History of urologic disorder or disease: no  History of irritative voiding symptoms: mild urgency  History of urinary tract infection: yes  Analgesic abuse: no  History of pelvic irradiation: no    The following portions of the patient's history were reviewed and updated as appropriate: allergies, current medications, past family history, past medical history, past social history, past surgical history and problem list.     REVIEW OF SYSTEMS   Review of Systems   Constitutional: Negative for chills and fever.   Respiratory: Negative for shortness of breath.    Cardiovascular: Negative for chest pain.   Gastrointestinal: Negative for nausea and vomiting.   Genitourinary: Positive for hematuria and urgency. Negative for dysuria and frequency.      Per HPI.     Patient Active Problem List   Diagnosis     Obesity     Prolapse of vaginal wall     Recurrent UTI     Advance Care Planning     Postmenopausal bleeding     Cystocele     Enterocele     Hypothyroidism     Osteopenia     Adenomatous polyp of colon     Degenerative arthritis of knee     Pyuria, sterile      Past Medical History:   Diagnosis Date     Adenomatous polyp of colon 5/2011    needs conolonospy 5/2016     Arthritis 2005     Hypothyroidism     Abstracted 7/16/02     Osteopenia       Past Surgical History:   Procedure Laterality Date     ARTHROPLASTY KNEE Left 5/19/2015    Procedure: ARTHROPLASTY KNEE;  Surgeon: Daniel Mack MD;  Location: [facility] OR     CHOLECYSTECTOMY       COLONOSCOPY N/A 5/5/2016     "Procedure: COLONOSCOPY;  Surgeon: Sage Berg MD;  Location:  GI     CYSTOSCOPY  1/31/2012    Procedure:CYSTOSCOPY; Surgeon:GAMA GRIDER; Location:[facility] OR     DAVINCI HYSTERECTOMY SUPRACERVICAL, SACROCOLPOPEXY, COMBINED  1/31/2012    Procedure:COMBINED DAVINCI HYSTERECTOMY SUPRACERVICAL, SACROCOLPOPEXY; DAVINCI LAPAROSCOPIC SUPRACERVICAL HYSTERECTOMY, BILATERAL SALPINGO-OOPHORECTOMY, SACROCOLPOPEXY  WITH COLOPLASTY MESH AND CYSTOPLASTY; Surgeon:GAMA GRIDER; Location:[facility] OR     HC REMOVAL OF TONSILS,<13 Y/O      Tonsils <12y.o.     HC TOOTH EXTRACTION W/FORCEP       ZZC NONSPECIFIC PROCEDURE      Cholecystectomy -- Abstracted 7/16/02     ZZHC COLONOSCOPY THRU STOMA, DIAGNOSTIC  2006      Social History:   .  Former smoker.  Quit in 1988.  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  Retired teacher.    Family History:   No known family history of nephrolithiasis or  malignancy.    Objective      PHYSICAL EXAM   /78   Ht 1.676 m (5' 6\")   Wt 83 kg (183 lb)   LMP 07/22/1997   BMI 29.54 kg/m     Physical Exam  Constitutional:       Appearance: Normal appearance.   HENT:      Head: Normocephalic.      Nose: Nose normal.   Eyes:      General: No scleral icterus.  Pulmonary:      Effort: Pulmonary effort is normal.   Abdominal:      General: There is no distension.   Musculoskeletal:         General: Normal range of motion.   Skin:     Findings: No rash.   Neurological:      General: No focal deficit present.      Mental Status: She is alert and oriented to person, place, and time.   Psychiatric:         Mood and Affect: Mood normal.         Behavior: Behavior normal.        LABORATORY   Recent Labs   Lab Test 12/02/21  1326 11/24/21  1415   COLOR Yellow Yellow   APPEARANCE Clear Slightly Cloudy*   URINEGLC Negative Negative   URINEBILI Negative Negative   URINEKETONE Negative Negative   SG 1.010 1.015   UBLD Trace* Small*   URINEPH 6.5 5.5   PROTEIN Negative Negative   UROBILINOGEN 0.2 0.2   NITRITE " Negative Negative   LEUKEST Moderate* Moderate*   RBCU  --  5-10*   WBCU  --  *     Urine culture 11/24/21: No growth  Urine culture 10/26/21: Serratia marcescens    Assessment & Plan    1. Pyuria    2. Microhematuria    3. Urinary tract infection      I had the pleasure today of meeting with Ms. Mcmullen to discuss her hematuria.  The differential diagnosis at this point includes stone disease, infection, urothelial malignancy, renal disorder versus another yet unknown diagnosis.  Many times, we do not find a cause, but we should rule out bad causes.    At this time, recommend proceeding with comprehensive hematuria evaluation to include:  - Urine cytology to look for cells concerning for malignancy--will likely obtain at time of cystoscopy.  - CT urogram for upper tract imaging. We did discuss that she had recent imaging 1.5 years ago that had no concerning findings. We could hold off on CT imaging at this time, but it would be reasonable for repeat CT imaging. Patient would feel more comfortable repeat CT urogram given the visible blood.    - Cystoscopy with the first available urologist to evaluate the interior of the bladder. Follow up for hematuria as recommended by urologist performing cystoscopic evaluation.    -Cystoscopic examination will be done with Dr. Fierro next week. We did discuss that she may be able to perform pelvic examination and comment on the degree of prolapse at that time. We briefly discussed a pessary. She is not particularly interested in this. Given the degree, she may benefit from pelvic floor physical therapy, in addition to continuing with vaginal estrogen cream.    Continue with Bactrim to cystoscopy.    Signed by:     Jenna Nguyen PA-C 12/2/2021 2:49 PM

## 2021-12-02 NOTE — NURSING NOTE
Chief Complaint   Patient presents with     Hematuria     Here to discuss    post void residual 271 ml  America Arriaga LPN

## 2021-12-03 ENCOUNTER — HOSPITAL ENCOUNTER (OUTPATIENT)
Dept: CT IMAGING | Facility: CLINIC | Age: 76
Discharge: HOME | End: 2021-12-03
Attending: PHYSICIAN ASSISTANT | Admitting: PHYSICIAN ASSISTANT
Payer: COMMERCIAL

## 2021-12-03 DIAGNOSIS — R31.29 MICROHEMATURIA: ICD-10-CM

## 2021-12-03 LAB
CREAT BLD-MCNC: 1.4 MG/DL (ref 0.5–1)
GFR SERPL CREATININE-BSD FRML MDRD: 37 ML/MIN/1.73M2

## 2021-12-03 PROCEDURE — 250N000009 HC RX 250: Performed by: PHYSICIAN ASSISTANT

## 2021-12-03 PROCEDURE — 74178 CT ABD&PLV WO CNTR FLWD CNTR: CPT

## 2021-12-03 PROCEDURE — 82565 ASSAY OF CREATININE: CPT

## 2021-12-03 PROCEDURE — 250N000011 HC RX IP 250 OP 636: Performed by: PHYSICIAN ASSISTANT

## 2021-12-03 RX ORDER — IOPAMIDOL 755 MG/ML
80 INJECTION, SOLUTION INTRAVASCULAR ONCE
Status: COMPLETED | OUTPATIENT
Start: 2021-12-03 | End: 2021-12-03

## 2021-12-03 RX ADMIN — SODIUM CHLORIDE 70 ML: 9 INJECTION, SOLUTION INTRAVENOUS at 17:38

## 2021-12-03 RX ADMIN — IOPAMIDOL 80 ML: 755 INJECTION, SOLUTION INTRAVENOUS at 17:24

## 2021-12-08 ENCOUNTER — OFFICE VISIT (OUTPATIENT)
Dept: UROLOGY | Facility: CLINIC | Age: 76
End: 2021-12-08
Payer: COMMERCIAL

## 2021-12-08 VITALS
DIASTOLIC BLOOD PRESSURE: 60 MMHG | SYSTOLIC BLOOD PRESSURE: 110 MMHG | WEIGHT: 183 LBS | HEIGHT: 66 IN | BODY MASS INDEX: 29.41 KG/M2

## 2021-12-08 DIAGNOSIS — R31.29 MICROHEMATURIA: Primary | ICD-10-CM

## 2021-12-08 DIAGNOSIS — N81.10 PROLAPSE OF VAGINAL WALL: ICD-10-CM

## 2021-12-08 DIAGNOSIS — N30.01 ACUTE CYSTITIS WITH HEMATURIA: ICD-10-CM

## 2021-12-08 LAB
ALBUMIN UR-MCNC: NEGATIVE MG/DL
APPEARANCE UR: CLEAR
BILIRUB UR QL STRIP: NEGATIVE
COLOR UR AUTO: YELLOW
GLUCOSE UR STRIP-MCNC: NEGATIVE MG/DL
HGB UR QL STRIP: NEGATIVE
KETONES UR STRIP-MCNC: NEGATIVE MG/DL
LEUKOCYTE ESTERASE UR QL STRIP: ABNORMAL
NITRATE UR QL: NEGATIVE
PH UR STRIP: 6.5 [PH] (ref 5–7)
RESIDUAL VOLUME (RV) (EXTERNAL): 203
SP GR UR STRIP: 1.01 (ref 1–1.03)
UROBILINOGEN UR STRIP-ACNC: 0.2 E.U./DL

## 2021-12-08 PROCEDURE — 81003 URINALYSIS AUTO W/O SCOPE: CPT | Mod: QW | Performed by: UROLOGY

## 2021-12-08 PROCEDURE — 51798 US URINE CAPACITY MEASURE: CPT | Performed by: UROLOGY

## 2021-12-08 PROCEDURE — 88112 CYTOPATH CELL ENHANCE TECH: CPT | Performed by: PATHOLOGY

## 2021-12-08 PROCEDURE — 52000 CYSTOURETHROSCOPY: CPT | Performed by: UROLOGY

## 2021-12-08 PROCEDURE — 57160 INSERT PESSARY/OTHER DEVICE: CPT | Performed by: UROLOGY

## 2021-12-08 PROCEDURE — 99213 OFFICE O/P EST LOW 20 MIN: CPT | Mod: 25 | Performed by: UROLOGY

## 2021-12-08 ASSESSMENT — MIFFLIN-ST. JEOR: SCORE: 1336.83

## 2021-12-08 ASSESSMENT — PAIN SCALES - GENERAL: PAINLEVEL: NO PAIN (0)

## 2021-12-08 NOTE — PATIENT INSTRUCTIONS
"Websites with free information:    [employer] patient website: www.voicesforpfd.org    Total Control Program: www.totalcontrolprogram.com    Use the pessary     Take the pessary out and leave out overnight at least 3 x a week and leave out over night.  Use the estrogen cream on those nights    It was a pleasure meeting with you today.  Thank you for allowing me and my team the privilege of caring for you today.  YOU are the reason we are here, and I truly hope we provided you with the excellent service you deserve.  Please let us know if there is anything else we can do for you so that we can be sure you are leaving completely satisfied with your care experience.    AFTER YOUR CYSTOSCOPY  ?  ?  You have just completed a cystoscopy, or \"cysto\", which allowed your physician to learn more about your bladder (or to remove a stent placed after surgery). We suggest that you continue to avoid caffeine, fruit juice, and alcohol for the next 24 hours, however, you are encouraged to return to your normal activities.  ?  ?  A few things that are considered normal after your cystoscopy:  ?  * small amount of bleeding (or spotting) that clears within the next 24 hours  ?  * slight burning sensation with urination  ?  * sensation of needing to void (urinate) more frequently  ?  * the feeling of \"air\" in your urine  ?  * mild discomfort that is relieved with Tylenol    * bladder spasms  ?  ?  ?  Please contact our office promptly if you:  ?  * develop a fever above 101 degrees  ?  * are unable to urinate  ?  * develop bright red blood that does not stop  ?  * experience severe pain or swelling  ?  ?  ?  And of course, please contact our office with any concerns or questions [phone]  ?    "

## 2021-12-08 NOTE — LETTER
"12/8/2021       RE: Kierra Mcmullen  [address] [place] [address]     Dear Colleague,    Thank you for referring your patient, Kierra Mcmullen, to the [facility]OLOGY [facility] [place] at [facility]. Please see a copy of my visit note below.    December 8, 2021    Return visit    Patient returns today for follow up of microscopic hematuria and prolapse.  She denies any changes in her health since last visit.    /60   Ht 1.676 m (5' 6\")   Wt 83 kg (183 lb)   LMP 07/22/1997   BMI 29.54 kg/m    She is comfortable, in no distress, non-labored breathing.  Abdomen is soft, non-tender, non-distended.  Normal external female genitalia.  Negative CST.  Pelvic exam is remarkable for stage III vault prolapse.  She was fitted with #6 ring with support which she tolerated and able to demonstrate self care.    Cystoscopy Note: After informed consent was obtained patient was prepped and draped in the standard fashion.  The flexible cystoscope was inserted into a normal appearing urethral meatus.  The urothelium was carefully examined and there were some diffuse cystitis lesion but there were no tumors, masses, stones, foreign bodies, or other urothelial abnormalities noted.  Bilateral ureteral orifices were noted in the normal orthotopic position and both effluxed clear urine.  The cystoscope was retroflexed and the bladder neck was unremarkable.  The urethra was carefully examined upon removing the cystoscope and was unremarkable.  Patient tolerated the procedure without complications noted.      CT urogram without etiology of the microscopic hematuria    A/P: 76 year old F with microscopic hematuria with negative evaluate aside from some cystitis lesions seen on cystoscopy, vault prolapse fitted with a pessary    Urine cytology today    She has been given estrogen cream so will use this on the nights she takes out her pessary    [place] " [place] and will return in the Spring when she is back    20 minutes were spent today on the date of the encounter in reviewing the EMR, direct patient care, coordination of care and documentation in addition to the cystoscopy and the pessary fitting    Mariah Fierro MD MPH  (she/her/hers)   of Urology  [facility]      CC  Patient Care Team:  Xavier Austin MD as PCP - General (OB/Gyn)  Matt Whitney MD as Assigned PCP  Xavier Austin MD as Assigned OBGYN Provider  Jenna Nguyen PA-C as Physician Assistant (Urology)  Xavier Austin MD as Referring Physician (OB/Gyn)

## 2021-12-08 NOTE — PROGRESS NOTES
"December 8, 2021    Return visit    Patient returns today for follow up of microscopic hematuria and prolapse.  She denies any changes in her health since last visit.    /60   Ht 1.676 m (5' 6\")   Wt 83 kg (183 lb)   LMP 07/22/1997   BMI 29.54 kg/m    She is comfortable, in no distress, non-labored breathing.  Abdomen is soft, non-tender, non-distended.  Normal external female genitalia.  Negative CST.  Pelvic exam is remarkable for stage III vault prolapse.  She was fitted with #6 ring with support which she tolerated and able to demonstrate self care.    Cystoscopy Note: After informed consent was obtained patient was prepped and draped in the standard fashion.  The flexible cystoscope was inserted into a normal appearing urethral meatus.  The urothelium was carefully examined and there were some diffuse cystitis lesion but there were no tumors, masses, stones, foreign bodies, or other urothelial abnormalities noted.  Bilateral ureteral orifices were noted in the normal orthotopic position and both effluxed clear urine.  The cystoscope was retroflexed and the bladder neck was unremarkable.  The urethra was carefully examined upon removing the cystoscope and was unremarkable.  Patient tolerated the procedure without complications noted.      CT urogram without etiology of the microscopic hematuria    A/P: 76 year old F with microscopic hematuria with negative evaluate aside from some cystitis lesions seen on cystoscopy, vault prolapse fitted with a pessary    Urine cytology today    She has been given estrogen cream so will use this on the nights she takes out her pessary    [place] is [place] and will return in the Spring when she is back    20 minutes were spent today on the date of the encounter in reviewing the EMR, direct patient care, coordination of care and documentation in addition to the cystoscopy and the pessary fitting    Mariah Fierro MD MPH  (she/her/hers)  Assistant " Professor of Urology  [facility]      CC  Patient Care Team:  Xavier Austin MD as PCP - General (OB/Gyn)  Matt Whitney MD as Assigned PCP  Xavier Austin MD as Assigned OBGYN Provider  Jenna Nguyen PA-C as Physician Assistant (Urology)  Xavier Austin MD as Referring Physician (OB/Gyn)

## 2021-12-08 NOTE — NURSING NOTE
Prior to the start of the procedure and with procedural staff participation, I verbally confirmed the patient s identity using two indicators, relevant allergies, that the procedure was appropriate and matched the consent or emergent situation, and that the correct equipment/implants were available. Immediately prior to starting the procedure I conducted the Time Out with the procedural staff and re-confirmed the patient s name, procedure, and site/side. I have wiped the patient off with the povidone-Iodine solution, draped them, and instilled sterile water into the bladder. (The Joint Commission universal protocol was followed.)  Yes    Sedation (Moderate or Deep): None  America Arriaga LPN

## 2021-12-09 LAB
PATH REPORT.COMMENTS IMP SPEC: NORMAL
PATH REPORT.FINAL DX SPEC: NORMAL
PATH REPORT.GROSS SPEC: NORMAL
PATH REPORT.MICROSCOPIC SPEC OTHER STN: NORMAL
PATH REPORT.RELEVANT HX SPEC: NORMAL

## 2021-12-13 DIAGNOSIS — N39.0 RECURRENT UTI: ICD-10-CM

## 2021-12-13 NOTE — TELEPHONE ENCOUNTER
[employer]/hrt h 0.2      Last Written Prescription Date:  11/27/20  Last Fill Quantity: 42 g,   # refills: 3  Last Office Visit: 4/7/21  Future Office visit:    Next 5 appointments (look out 90 days)    Dec 23, 2021  3:15 PM  Office Visit with Xavier Austin MD  [facility] ([facility] ) [address] [place] [id number] [phone]

## 2021-12-14 ENCOUNTER — TELEPHONE (OUTPATIENT)
Dept: UROLOGY | Facility: CLINIC | Age: 76
End: 2021-12-14
Payer: COMMERCIAL

## 2021-12-14 NOTE — TELEPHONE ENCOUNTER
"Urology pt of Dr. Fierro last seen 12/8/21. Kierra was fitted for a pessary at that visit and she calls today with update and questions.   First she reports a change in her bowel habit. She states she's been having \"tiny\" frequent BMs. She does not have a problem with constipation. She asks if the pessary could be pressing on her bowel. Also, she's notice a small amount of blood on her pad which she feels is vaginal. This just happened today. Kierra reports she can feel the pessary when it's in place; it feels like an ill-fitting Tampax and she asks if this is as it should be. She has an Rx for estrogen cream that was ordered by her PCP - Dr. Austin. Dr. Fierro had advised she use the cream 3x/week (at the times she removes the pessary) but the cream was prescribed for 2x/week by Dr. Austin. Pt asks about changing the directions on the RX to reflect increased use so she doesn't have trouble with refills. Will review these question with provider.  Per Dr. Fierro, pt should remove pessary and leave out for about a week. Re: the bleeding, she should have an exam by either Dr. Fierro or Dr Austin and Kierra states she already has appt with Dr. Austin scheduled for 12/23. She will contact that office to see if this visit could include an exam. Estrogen cream should be managed by Dr. Austin. If exam goes well, may try pessary again and pt is encouraged to contact this clinic, if questions.  Kierra reports she'll be leaving [place] for the winter in late December and may consult a Urologist at that location.  "

## 2021-12-20 ENCOUNTER — LAB (OUTPATIENT)
Dept: LAB | Facility: CLINIC | Age: 76
End: 2021-12-20
Payer: COMMERCIAL

## 2021-12-20 DIAGNOSIS — N39.0 RECURRENT UTI: ICD-10-CM

## 2021-12-20 LAB
ALBUMIN UR-MCNC: NEGATIVE MG/DL
APPEARANCE UR: CLEAR
BACTERIA #/AREA URNS HPF: ABNORMAL /HPF
BILIRUB UR QL STRIP: NEGATIVE
COLOR UR AUTO: YELLOW
GLUCOSE UR STRIP-MCNC: NEGATIVE MG/DL
HGB UR QL STRIP: ABNORMAL
KETONES UR STRIP-MCNC: NEGATIVE MG/DL
LEUKOCYTE ESTERASE UR QL STRIP: ABNORMAL
NITRATE UR QL: NEGATIVE
PH UR STRIP: 7 [PH] (ref 5–7)
RBC #/AREA URNS AUTO: ABNORMAL /HPF
SP GR UR STRIP: 1.01 (ref 1–1.03)
SQUAMOUS #/AREA URNS AUTO: ABNORMAL /LPF
UROBILINOGEN UR STRIP-ACNC: 0.2 E.U./DL
WBC #/AREA URNS AUTO: ABNORMAL /HPF

## 2021-12-20 PROCEDURE — 87086 URINE CULTURE/COLONY COUNT: CPT

## 2021-12-20 PROCEDURE — 81001 URINALYSIS AUTO W/SCOPE: CPT

## 2021-12-21 LAB — BACTERIA UR CULT: NO GROWTH

## 2021-12-23 ENCOUNTER — OFFICE VISIT (OUTPATIENT)
Dept: OBGYN | Facility: CLINIC | Age: 76
End: 2021-12-23
Payer: COMMERCIAL

## 2021-12-23 VITALS — WEIGHT: 187 LBS | BODY MASS INDEX: 30.18 KG/M2 | DIASTOLIC BLOOD PRESSURE: 72 MMHG | SYSTOLIC BLOOD PRESSURE: 120 MMHG

## 2021-12-23 DIAGNOSIS — N39.0 RECURRENT UTI: Primary | ICD-10-CM

## 2021-12-23 DIAGNOSIS — N81.11 CYSTOCELE, MIDLINE: ICD-10-CM

## 2021-12-23 LAB
ALBUMIN SERPL-MCNC: 3.5 G/DL (ref 3.4–5)
ALP SERPL-CCNC: 69 U/L (ref 40–150)
ALT SERPL W P-5'-P-CCNC: 21 U/L (ref 0–50)
ANION GAP SERPL CALCULATED.3IONS-SCNC: 5 MMOL/L (ref 3–14)
AST SERPL W P-5'-P-CCNC: 8 U/L (ref 0–45)
BILIRUB SERPL-MCNC: 0.2 MG/DL (ref 0.2–1.3)
BUN SERPL-MCNC: 21 MG/DL (ref 7–30)
CALCIUM SERPL-MCNC: 9.3 MG/DL (ref 8.5–10.1)
CHLORIDE BLD-SCNC: 109 MMOL/L (ref 94–109)
CO2 SERPL-SCNC: 25 MMOL/L (ref 20–32)
CREAT SERPL-MCNC: 0.93 MG/DL (ref 0.52–1.04)
GFR SERPL CREATININE-BSD FRML MDRD: 63 ML/MIN/1.73M2
GLUCOSE BLD-MCNC: 78 MG/DL (ref 70–99)
POTASSIUM BLD-SCNC: 3.8 MMOL/L (ref 3.4–5.3)
PROT SERPL-MCNC: 7.3 G/DL (ref 6.8–8.8)
SODIUM SERPL-SCNC: 139 MMOL/L (ref 133–144)

## 2021-12-23 PROCEDURE — 36415 COLL VENOUS BLD VENIPUNCTURE: CPT | Performed by: OBSTETRICS & GYNECOLOGY

## 2021-12-23 PROCEDURE — 99213 OFFICE O/P EST LOW 20 MIN: CPT | Mod: 25 | Performed by: OBSTETRICS & GYNECOLOGY

## 2021-12-23 PROCEDURE — 57160 INSERT PESSARY/OTHER DEVICE: CPT | Performed by: OBSTETRICS & GYNECOLOGY

## 2021-12-23 PROCEDURE — A4562 PESSARY, NON RUBBER,ANY TYPE: HCPCS | Performed by: OBSTETRICS & GYNECOLOGY

## 2021-12-23 PROCEDURE — 80053 COMPREHEN METABOLIC PANEL: CPT | Performed by: OBSTETRICS & GYNECOLOGY

## 2021-12-23 NOTE — PATIENT INSTRUCTIONS
You can reach your [facility] Care Team any time of the day by calling [phone]. This number will put you in touch with the 24 hour nurse line if the clinic is closed.    To contact your OB/GYN Station Coordinator/Surgery Scheduler please call [phone]. This is a direct number for your care team between 8 a.m. and 4 p.m. Monday through Friday.    [facility] is open for your convenience:  Monday through Friday 8 a.m. to 6 p.m.  Closed weekends and all major holidays.

## 2021-12-23 NOTE — NURSING NOTE
"Chief Complaint   Patient presents with     Consult     pessary and urine        Initial /72   Wt 84.8 kg (187 lb)   LMP 1997   BMI 30.18 kg/m   Estimated body mass index is 30.18 kg/m  as calculated from the following:    Height as of 21: 1.676 m (5' 6\").    Weight as of this encounter: 84.8 kg (187 lb).  BP completed using cuff size: regular    Questioned patient about current smoking habits.  Pt. has never smoked.          The following HM Due: NONE      The following patient reported/Care Every where data was sent to:  P ABSTRACT QUALITY INITIATIVES [[id number]]        Yolande Lynn CMA                 "

## 2021-12-25 NOTE — PROGRESS NOTES
HPI:  Kierra Mcmullen is a 76 year old white female  Patient's last menstrual period was 1997.  Who is status post da Erica robotic supracervical hysterectomy BSO sacrocolpopexy 2012 who presents today for a follow-up of recurrent UTIs and a recurrence of her cystocele.  I have reviewed the previous notes from the urologic consult from [facility] and the normal cystoscopy results from Dr. Mariah Fierro.  The patient was fitted with a #6 diaphragm pessary and does not want to wear it because it is very uncomfortable for her she is wondering if there is any other alternatives.  She and her  travel south to [place] for the winter and are leaving early next week.  I had a lengthy discussion with the patient and her  today regarding recurrent UTIs, her history of sterile pyuria, the pathophysiology of the disease process and the risk benefits and alternative forms of therapy.  We also discussed alternative treatment modalities for her cystocele.  She is traveling to [place] for the next 4 months and I suggested perhaps a temporizing measure until she gets back next spring at which time the issue could be further addressed.  The patient would like to see if there is an alternative pessary that could be used, one that is not uncomfortable for her and yet provides adequate support.  She has had no vaginal bleeding.    Past Medical History:   Diagnosis Date     Adenomatous polyp of colon 2011    needs conolonospy 2016     Arthritis 2005     Hypothyroidism     Abstracted 02     Osteopenia      Past Surgical History:   Procedure Laterality Date     ARTHROPLASTY KNEE Left 2015    Procedure: ARTHROPLASTY KNEE;  Surgeon: Daniel Mack MD;  Location:  OR     CHOLECYSTECTOMY       COLONOSCOPY N/A 2016    Procedure: COLONOSCOPY;  Surgeon: Sage Berg MD;  Location:  GI     CYSTOSCOPY  2012    Procedure:CYSTOSCOPY; Surgeon:GAMA GRIDER;  Location:[facility] OR     DAVINCI HYSTERECTOMY SUPRACERVICAL, SACROCOLPOPEXY, COMBINED  2012    Procedure:COMBINED DAVINCI HYSTERECTOMY SUPRACERVICAL, SACROCOLPOPEXY; DAVINCI LAPAROSCOPIC SUPRACERVICAL HYSTERECTOMY, BILATERAL SALPINGO-OOPHORECTOMY, SACROCOLPOPEXY  WITH COLOPLASTY MESH AND CYSTOPLASTY; Surgeon:GAMA GRIDER; Location:[facility] OR     HC REMOVAL OF TONSILS,<13 Y/O      Tonsils <12y.o.     HC TOOTH EXTRACTION W/FORCEP       ZZC NONSPECIFIC PROCEDURE      Cholecystectomy -- Abstracted 02     ZZHC COLONOSCOPY THRU STOMA, DIAGNOSTIC       Family History   Problem Relation Age of Onset     Alzheimer Disease Father      Cancer Mother          non-Hodgkins lymphoma     Osteoporosis Mother      Arthritis Mother         RA     Gastrointestinal Disease Mother         UC     Thyroid Disease Mother      Osteoporosis Sister      Gastrointestinal Disease Sister         diverticulitis     Cardiovascular Maternal Aunt         MI     Breast Cancer Paternal Aunt      Social History     Socioeconomic History     Marital status:      Spouse name: Sage     Number of children: 1     Years of education: 18     Highest education level: Not on file   Occupational History     Occupation: Teacher     Employer: [employer] [address]   Tobacco Use     Smoking status: Former Smoker     Packs/day: 1.00     Years: 25.00     Pack years: 25.00     Types: Cigarettes     Start date: 1962     Quit date: 1987     Years since quittin.0     Smokeless tobacco: Never Used     Tobacco comment:    Vaping Use     Vaping Use: Never used   Substance and Sexual Activity     Alcohol use: Yes     Alcohol/week: 10.0 standard drinks     Comment: 1-2 glasses of wine/week     Drug use: No     Sexual activity: Yes     Partners: Male     Birth control/protection: None     Comment: vas   Other Topics Concern      Service Not Asked     Blood Transfusions Not Asked     Caffeine Concern No     Occupational  Exposure No     Hobby Hazards No     Sleep Concern No     Stress Concern No     Weight Concern No     Special Diet No     Back Care No     Exercise Yes     Comment: walk     Bike Helmet Not Asked     Seat Belt Yes     Self-Exams Not Asked     Parent/sibling w/ CABG, MI or angioplasty before 65F 55M? No   Social History Narrative     Not on file     Social Determinants of Health     Financial Resource Strain: Not on file   Food Insecurity: Not on file   Transportation Needs: Not on file   Physical Activity: Not on file   Stress: Not on file   Social Connections: Not on file   Intimate Partner Violence: Not on file   Housing Stability: Not on file       Allergies:  Patient has no known allergies.    Current Outpatient Medications   Medication Sig Dispense Refill     Biotin 5000 MCG CAPS        CALCIUM + D 600-200 MG-IU OR TABS 1 TABLET DAILY (Patient taking differently: 2 TABLETS DAILY) 30 0     calcium polycarbophil (FIBERCON) 625 MG tablet Take 2 tablets by mouth daily       COMPOUNDED NON-CONTROLLED SUBSTANCE (CMPD RX) - PHARMACY TO MIX COMPOUNDED MEDICATION Estradiol 0.02% in HRT heavy (cream base)  Place in tube and include vaginal applicator  Si/2 gm intravaginally at HS on Mon and Thurs pm as directed 42 g 3     GLUCOSAMINE CHONDROITIN COMPLX PO Take 1 tablet by mouth daily 750/600        levothyroxine (SYNTHROID/LEVOTHROID) 100 MCG tablet TAKE 1 TABLET DAILY 90 tablet 1     MULTI-DAY OR TABS one PO QD         Review Of Systems   ROS: 10 point ROS neg other than the symptoms noted above in the HPI.    Exam:  /72   Wt 84.8 kg (187 lb)   LMP 1997   BMI 30.18 kg/m    {Constitutional: healthy, alert and no distress  Genitourinary: Normal external genitalia without lesions and there is a midline cystocele that protrudes a centimeter from the introitus with cough and Valsalva.  Good apical support.  Vaginal epithelium otherwise appears to be unremarkable.  Cervical stump is visible.  Bimanual exam  uterus is absent stump is present adnexa without masses enlargement or tenderness.  The patient was fitted with a #5 diaphragm pessary which did result in good support and was not uncomfortable for her.  The patient is able to insert and remove it.  I suggested inserting it in the morning and removing it at night and using a Trimo-Carr gel to minimize risk of complications.  I discussed the pros and cons of estrogen therapy and the results of the women's health study    Assessment/Plan:  (N39.0) Recurrent UTI  (primary encounter diagnosis)  Comment: We discussed other nonantibiotic agents that could be used for suppression of UTIs.  We also discussed methodologies to maximize emptying  Plan: Comprehensive metabolic panel (BMP + Alb, Alk         Phos, ALT, AST, Total. Bili, TP)        I will check liver enzymes today.  The plan is to have the patient let us know if she has any urinary symptoms.  She can get a urine analysis in [facility] now the results sent to me and appropriate therapy will be undertaken.  She will let us know there is concerns.  We can adjust surgical options for her recurring cystocele when we see her back  .  A detailed written plan was given  25 minutes spent in chart review exam plan formation and discussion      Xavier Austin M.D.

## 2022-02-23 DIAGNOSIS — E07.9 THYROID DYSFUNCTION: ICD-10-CM

## 2022-02-24 RX ORDER — LEVOTHYROXINE SODIUM 100 UG/1
TABLET ORAL
Qty: 90 TABLET | Refills: 0 | Status: SHIPPED | OUTPATIENT
Start: 2022-02-24 | End: 2022-04-12

## 2022-04-11 ASSESSMENT — ACTIVITIES OF DAILY LIVING (ADL): CURRENT_FUNCTION: NO ASSISTANCE NEEDED

## 2022-04-11 ASSESSMENT — ENCOUNTER SYMPTOMS
DIZZINESS: 0
ARTHRALGIAS: 0
MYALGIAS: 0
SHORTNESS OF BREATH: 0
CHILLS: 0
DIARRHEA: 0
FEVER: 0
COUGH: 0
HEADACHES: 0
JOINT SWELLING: 0
PALPITATIONS: 0
HEMATURIA: 0
FREQUENCY: 1
HEMATOCHEZIA: 0
DYSURIA: 0
HEARTBURN: 0
PARESTHESIAS: 0
SORE THROAT: 0
BREAST MASS: 0
NAUSEA: 0
NERVOUS/ANXIOUS: 0
WEAKNESS: 0
CONSTIPATION: 0
EYE PAIN: 0
ABDOMINAL PAIN: 0

## 2022-04-12 ENCOUNTER — OFFICE VISIT (OUTPATIENT)
Dept: INTERNAL MEDICINE | Facility: CLINIC | Age: 77
End: 2022-04-12
Payer: COMMERCIAL

## 2022-04-12 VITALS
HEIGHT: 65 IN | TEMPERATURE: 97.9 F | RESPIRATION RATE: 16 BRPM | SYSTOLIC BLOOD PRESSURE: 110 MMHG | WEIGHT: 182 LBS | DIASTOLIC BLOOD PRESSURE: 90 MMHG | BODY MASS INDEX: 30.32 KG/M2 | OXYGEN SATURATION: 99 % | HEART RATE: 75 BPM

## 2022-04-12 DIAGNOSIS — E07.9 THYROID DYSFUNCTION: ICD-10-CM

## 2022-04-12 DIAGNOSIS — N30.00 ACUTE CYSTITIS WITHOUT HEMATURIA: ICD-10-CM

## 2022-04-12 DIAGNOSIS — E03.9 ACQUIRED HYPOTHYROIDISM: ICD-10-CM

## 2022-04-12 DIAGNOSIS — Z00.00 ENCOUNTER FOR PREVENTATIVE ADULT HEALTH CARE EXAMINATION: Primary | ICD-10-CM

## 2022-04-12 LAB
ALBUMIN UR-MCNC: NEGATIVE MG/DL
APPEARANCE UR: CLEAR
BACTERIA #/AREA URNS HPF: ABNORMAL /HPF
BILIRUB UR QL STRIP: NEGATIVE
COLOR UR AUTO: YELLOW
ERYTHROCYTE [DISTWIDTH] IN BLOOD BY AUTOMATED COUNT: 13.6 % (ref 10–15)
GLUCOSE UR STRIP-MCNC: NEGATIVE MG/DL
HCT VFR BLD AUTO: 44.9 % (ref 35–47)
HGB BLD-MCNC: 14.9 G/DL (ref 11.7–15.7)
HGB UR QL STRIP: ABNORMAL
KETONES UR STRIP-MCNC: NEGATIVE MG/DL
LEUKOCYTE ESTERASE UR QL STRIP: ABNORMAL
MCH RBC QN AUTO: 29.9 PG (ref 26.5–33)
MCHC RBC AUTO-ENTMCNC: 33.2 G/DL (ref 31.5–36.5)
MCV RBC AUTO: 90 FL (ref 78–100)
NITRATE UR QL: POSITIVE
PH UR STRIP: 6.5 [PH] (ref 5–7)
PLATELET # BLD AUTO: 247 10E3/UL (ref 150–450)
RBC # BLD AUTO: 4.98 10E6/UL (ref 3.8–5.2)
RBC #/AREA URNS AUTO: ABNORMAL /HPF
SP GR UR STRIP: 1.01 (ref 1–1.03)
SQUAMOUS #/AREA URNS AUTO: ABNORMAL /LPF
UROBILINOGEN UR STRIP-ACNC: 0.2 E.U./DL
WBC # BLD AUTO: 4.5 10E3/UL (ref 4–11)
WBC #/AREA URNS AUTO: ABNORMAL /HPF

## 2022-04-12 PROCEDURE — 84443 ASSAY THYROID STIM HORMONE: CPT | Performed by: INTERNAL MEDICINE

## 2022-04-12 PROCEDURE — 84439 ASSAY OF FREE THYROXINE: CPT | Performed by: INTERNAL MEDICINE

## 2022-04-12 PROCEDURE — 85027 COMPLETE CBC AUTOMATED: CPT | Performed by: INTERNAL MEDICINE

## 2022-04-12 PROCEDURE — 87086 URINE CULTURE/COLONY COUNT: CPT | Performed by: INTERNAL MEDICINE

## 2022-04-12 PROCEDURE — 80061 LIPID PANEL: CPT | Performed by: INTERNAL MEDICINE

## 2022-04-12 PROCEDURE — 87186 SC STD MICRODIL/AGAR DIL: CPT | Performed by: INTERNAL MEDICINE

## 2022-04-12 PROCEDURE — 80053 COMPREHEN METABOLIC PANEL: CPT | Performed by: INTERNAL MEDICINE

## 2022-04-12 PROCEDURE — 36415 COLL VENOUS BLD VENIPUNCTURE: CPT | Performed by: INTERNAL MEDICINE

## 2022-04-12 PROCEDURE — 81001 URINALYSIS AUTO W/SCOPE: CPT | Performed by: INTERNAL MEDICINE

## 2022-04-12 PROCEDURE — 99397 PER PM REEVAL EST PAT 65+ YR: CPT | Performed by: INTERNAL MEDICINE

## 2022-04-12 RX ORDER — SULFAMETHOXAZOLE/TRIMETHOPRIM 800-160 MG
1 TABLET ORAL 2 TIMES DAILY
Qty: 14 TABLET | Refills: 0 | Status: SHIPPED | OUTPATIENT
Start: 2022-04-12 | End: 2022-09-20

## 2022-04-12 RX ORDER — LEVOTHYROXINE SODIUM 100 UG/1
100 TABLET ORAL DAILY
Qty: 90 TABLET | Refills: 3 | Status: SHIPPED | OUTPATIENT
Start: 2022-04-12 | End: 2022-04-28

## 2022-04-12 ASSESSMENT — ENCOUNTER SYMPTOMS
COUGH: 0
HEADACHES: 0
DIARRHEA: 0
HEMATOCHEZIA: 0
PARESTHESIAS: 0
MYALGIAS: 0
PALPITATIONS: 0
NERVOUS/ANXIOUS: 0
BREAST MASS: 0
FREQUENCY: 1
ABDOMINAL PAIN: 0
SHORTNESS OF BREATH: 0
DYSURIA: 0
WEAKNESS: 0
HEMATURIA: 0
CHILLS: 0
CONSTIPATION: 0
EYE PAIN: 0
SORE THROAT: 0
DIZZINESS: 0
HEARTBURN: 0
FEVER: 0
ARTHRALGIAS: 0
JOINT SWELLING: 0
NAUSEA: 0

## 2022-04-12 ASSESSMENT — ACTIVITIES OF DAILY LIVING (ADL): CURRENT_FUNCTION: NO ASSISTANCE NEEDED

## 2022-04-12 NOTE — PROGRESS NOTES
"   SUBJECTIVE:   CC: Kierra Mcmullen is an 77 year old woman who presents for preventive health visit.         Patient has been advised of split billing requirements and indicates understanding: Yes  Healthy Habits:     In general, how would you rate your overall health?  Excellent    Frequency of exercise:  6-7 days/week    Duration of exercise:  45-60 minutes    Do you usually eat at least 4 servings of fruit and vegetables a day, include whole grains    & fiber and avoid regularly eating high fat or \"junk\" foods?  Yes    Taking medications regularly:  Yes    Medication side effects:  None    Ability to successfully perform activities of daily living:  No assistance needed    Home Safety:  No safety concerns identified    Hearing Impairment:  No hearing concerns    In the past 6 months, have you been bothered by leaking of urine? Yes    In general, how would you rate your overall mental or emotional health?  Excellent      PHQ-2 Total Score: 0    Additional concerns today:  Yes          PROBLEMS TO ADD ON...  Has history of hypothyroidism. On replacement treatment with Synthroid. No heat /cold intolerance, heart palpitations, weight loss/ gain ,  change in bowel habits.      Today's PHQ-2 Score:   PHQ-2 (  [employer]) 2022   Q1: Little interest or pleasure in doing things 0   Q2: Feeling down, depressed or hopeless 0   PHQ-2 Score 0   PHQ-2 Total Score (12-17 Years)- Positive if 3 or more points; Administer PHQ-A if positive -   Q1: Little interest or pleasure in doing things Not at all   Q2: Feeling down, depressed or hopeless Not at all   PHQ-2 Score 0       Abuse: Current or Past (Physical, Sexual or Emotional) - No  Do you feel safe in your environment? Yes        Social History     Tobacco Use     Smoking status: Former Smoker     Packs/day: 1.00     Years: 25.00     Pack years: 25.00     Types: Cigarettes     Start date: 1962     Quit date: 1987     Years since quittin.3     Smokeless " tobacco: Never Used     Tobacco comment: 1985   Substance Use Topics     Alcohol use: Yes     Alcohol/week: 10.0 standard drinks     Comment: 1-2 glasses of wine/week         Alcohol Use 4/11/2022   Prescreen: >3 drinks/day or >7 drinks/week? No   Prescreen: >3 drinks/day or >7 drinks/week? -       Reviewed orders with patient.  Reviewed health maintenance and updated orders accordingly - Yes  Lab work is in process  Labs reviewed in EPIC    Breast Cancer Screening:    Mammogram Screening - Patient over age 75, has elected to continue with screening.  Pertinent mammograms are reviewed under the imaging tab.      PAP / HPV 1/7/2013 12/10/2010 12/8/2009   PAP (Historical) NIL NIL NIL     Reviewed and updated as needed this visit by clinical staff   Tobacco  Allergies  Meds   Med Hx  Surg Hx  Fam Hx  Soc Hx          Reviewed and updated as needed this visit by Provider                       Review of Systems   Constitutional: Negative for chills and fever.   HENT: Negative for congestion, ear pain, hearing loss and sore throat.    Eyes: Negative for pain and visual disturbance.   Respiratory: Negative for cough and shortness of breath.    Cardiovascular: Negative for chest pain, palpitations and peripheral edema.   Gastrointestinal: Negative for abdominal pain, constipation, diarrhea, heartburn, hematochezia and nausea.   Breasts:  Negative for tenderness, breast mass and discharge.   Genitourinary: Positive for frequency. Negative for dysuria, genital sores, hematuria, pelvic pain, urgency, vaginal bleeding and vaginal discharge.   Musculoskeletal: Negative for arthralgias, joint swelling and myalgias.   Skin: Negative for rash.   Neurological: Negative for dizziness, weakness, headaches and paresthesias.   Psychiatric/Behavioral: Negative for mood changes. The patient is not nervous/anxious.       OBJECTIVE:   LMP 07/22/1997   Physical Exam  GENERAL: healthy, alert and no distress  EYES: Eyes grossly normal to  "inspection, PERRL and conjunctivae and sclerae normal  HENT: ear canals and TM's normal, nose and mouth without ulcers or lesions  NECK: no adenopathy, no asymmetry, masses, or scars and thyroid normal to palpation  RESP: lungs clear to auscultation - no rales, rhonchi or wheezes  CV: regular rate and rhythm, normal S1 S2, no S3 or S4, no murmur, click or rub, no peripheral edema and peripheral pulses strong  ABDOMEN: soft, nontender, no hepatosplenomegaly, no masses and bowel sounds normal  MS: no gross musculoskeletal defects noted, no edema  SKIN: no suspicious lesions or rashes  NEURO: Normal strength and tone, mentation intact and speech normal  PSYCH: mentation appears normal, affect normal/bright    Diagnostic Test Results:  Labs reviewed in Epic    ASSESSMENT/PLAN:       ICD-10-CM    1. Encounter for preventative adult health care examination  Z00.00 Lipid panel reflex to direct LDL Fasting     CBC with platelets     Comprehensive metabolic panel (BMP + Alb, Alk Phos, ALT, AST, Total. Bili, TP)     TSH with free T4 reflex     UA with Microscopic reflex to Culture - lab collect   2. Thyroid dysfunction  E07.9 levothyroxine (SYNTHROID/LEVOTHROID) 100 MCG tablet   3. Acquired hypothyroidism  E03.9 TSH with free T4 reflex       Patient has been advised of split billing requirements and indicates understanding: Yes    COUNSELING:  Reviewed preventive health counseling, as reflected in patient instructions       Regular exercise       Healthy diet/nutrition       Vision screening       Hearing screening    Estimated body mass index is 30.18 kg/m  as calculated from the following:    Height as of 12/8/21: 1.676 m (5' 6\").    Weight as of 12/23/21: 84.8 kg (187 lb).    Weight management plan: Discussed healthy diet and exercise guidelines    She reports that she quit smoking about 35 years ago. Her smoking use included cigarettes. She started smoking about 60 years ago. She has a 25.00 pack-year smoking history. She " has never used smokeless tobacco.      Counseling Resources:  ATP IV Guidelines  Pooled Cohorts Equation Calculator  Breast Cancer Risk Calculator  BRCA-Related Cancer Risk Assessment: FHS-7 Tool  FRAX Risk Assessment  ICSI Preventive Guidelines  Dietary Guidelines for Americans, 2010  USDA's MyPlate  ASA Prophylaxis  Lung CA Screening    Matt Whitney MD  [facility]

## 2022-04-12 NOTE — NURSING NOTE
"Patient states does not want a \"wellness visit \" she is requesting a physical she states she has already discussed with her insurance company and they will cover physical BDontae Ayala LPN    "

## 2022-04-13 ENCOUNTER — MYC MEDICAL ADVICE (OUTPATIENT)
Dept: INTERNAL MEDICINE | Facility: CLINIC | Age: 77
End: 2022-04-13
Payer: COMMERCIAL

## 2022-04-13 LAB
ALBUMIN SERPL-MCNC: 3.7 G/DL (ref 3.4–5)
ALP SERPL-CCNC: 74 U/L (ref 40–150)
ALT SERPL W P-5'-P-CCNC: 25 U/L (ref 0–50)
ANION GAP SERPL CALCULATED.3IONS-SCNC: 6 MMOL/L (ref 3–14)
AST SERPL W P-5'-P-CCNC: 19 U/L (ref 0–45)
BACTERIA UR CULT: ABNORMAL
BILIRUB SERPL-MCNC: 0.6 MG/DL (ref 0.2–1.3)
BUN SERPL-MCNC: 22 MG/DL (ref 7–30)
CALCIUM SERPL-MCNC: 10.2 MG/DL (ref 8.5–10.1)
CHLORIDE BLD-SCNC: 106 MMOL/L (ref 94–109)
CHOLEST SERPL-MCNC: 187 MG/DL
CO2 SERPL-SCNC: 28 MMOL/L (ref 20–32)
CREAT SERPL-MCNC: 0.86 MG/DL (ref 0.52–1.04)
FASTING STATUS PATIENT QL REPORTED: YES
GFR SERPL CREATININE-BSD FRML MDRD: 69 ML/MIN/1.73M2
GLUCOSE BLD-MCNC: 103 MG/DL (ref 70–99)
HDLC SERPL-MCNC: 76 MG/DL
LDLC SERPL CALC-MCNC: 97 MG/DL
NONHDLC SERPL-MCNC: 111 MG/DL
POTASSIUM BLD-SCNC: 3.9 MMOL/L (ref 3.4–5.3)
PROT SERPL-MCNC: 7.8 G/DL (ref 6.8–8.8)
SODIUM SERPL-SCNC: 140 MMOL/L (ref 133–144)
T4 FREE SERPL-MCNC: 1.36 NG/DL (ref 0.76–1.46)
TRIGL SERPL-MCNC: 69 MG/DL
TSH SERPL DL<=0.005 MIU/L-ACNC: 0.35 MU/L (ref 0.4–4)

## 2022-04-19 ENCOUNTER — OFFICE VISIT (OUTPATIENT)
Dept: OBGYN | Facility: CLINIC | Age: 77
End: 2022-04-19
Payer: COMMERCIAL

## 2022-04-19 ENCOUNTER — ALLIED HEALTH/NURSE VISIT (OUTPATIENT)
Dept: INTERNAL MEDICINE | Facility: CLINIC | Age: 77
End: 2022-04-19
Payer: COMMERCIAL

## 2022-04-19 VITALS — DIASTOLIC BLOOD PRESSURE: 70 MMHG | SYSTOLIC BLOOD PRESSURE: 102 MMHG | BODY MASS INDEX: 30.09 KG/M2 | WEIGHT: 182.2 LBS

## 2022-04-19 DIAGNOSIS — N81.11 CYSTOCELE, MIDLINE: ICD-10-CM

## 2022-04-19 DIAGNOSIS — N39.0 RECURRENT UTI: ICD-10-CM

## 2022-04-19 DIAGNOSIS — Z01.818 PRE-OP EXAM: Primary | ICD-10-CM

## 2022-04-19 DIAGNOSIS — E07.9 THYROID DYSFUNCTION: Primary | ICD-10-CM

## 2022-04-19 DIAGNOSIS — Z01.818 PRE-OP EXAM: ICD-10-CM

## 2022-04-19 PROCEDURE — 99215 OFFICE O/P EST HI 40 MIN: CPT | Performed by: OBSTETRICS & GYNECOLOGY

## 2022-04-19 PROCEDURE — 93000 ELECTROCARDIOGRAM COMPLETE: CPT | Performed by: OBSTETRICS & GYNECOLOGY

## 2022-04-19 RX ORDER — LEVOTHYROXINE SODIUM 88 UG/1
88 TABLET ORAL DAILY
Qty: 60 TABLET | Refills: 1 | Status: SHIPPED | OUTPATIENT
Start: 2022-04-19 | End: 2022-07-14

## 2022-04-19 RX ORDER — LEVOTHYROXINE SODIUM 88 UG/1
88 TABLET ORAL DAILY
Qty: 60 TABLET | Refills: 0 | Status: SHIPPED | OUTPATIENT
Start: 2022-04-19 | End: 2022-04-19

## 2022-04-19 NOTE — NURSING NOTE
"Chief Complaint   Patient presents with     Pessary Check/Fit/Insert     Last OV 2021        Initial /70 (BP Location: Left arm, Cuff Size: Adult Regular)   Wt 82.6 kg (182 lb 3.2 oz)   LMP 1997   Breastfeeding No   BMI 30.09 kg/m   Estimated body mass index is 30.09 kg/m  as calculated from the following:    Height as of 22: 1.657 m (5' 5.25\").    Weight as of this encounter: 82.6 kg (182 lb 3.2 oz).  BP completed using cuff size: regular    Questioned patient about current smoking habits.  Pt. quit smoking some time ago.          The following HM Due: NONE      Aimee Ang, NEGRA on 2022 at 1:08 PM       "

## 2022-04-20 NOTE — PATIENT INSTRUCTIONS
You can reach your [facility] Care Team any time of the day by calling [phone]. This number will put you in touch with the 24 hour nurse line if the clinic is closed.    To contact your OB/GYN Station Coordinator/Surgery Scheduler please call [phone]. This is a direct number for your care team between 8 a.m. and 4 p.m. Monday through Friday.    [facility] is open for your convenience:  Monday through Friday 8 a.m. to 6 p.m.  Closed weekends and all major holidays.

## 2022-04-20 NOTE — PROGRESS NOTES
HPI:  Kierra Mcmullen is a 77 year old white female  ,menopause and status post robotic supracervical hysterectomy BSO sacrocolpopexy 2012 for pelvic floor relaxation  who presents for follow-up of recurrent UTIs, treatment options for recurring grade 2 midline cystocele.  She also is scheduled to have right knee arthroplasty by Dr. Emery Mendes at [facility] on 2022 and would like a preop physical exam and EKG.  She is otherwise doing well  She is completing  Course of abx for a recent UTI. We discussed a f/u UC to document resolution  Self breast exam,  ACS screening mammogram recs, the use of 81 mg ASA to decrease the risk of heart disease, lipid screening, colon cancer screening recs and Dexa scan recs thoroughly reveiwed.      Past Medical History:   Diagnosis Date     Adenomatous polyp of colon 2011    needs conolonospy 2016     Arthritis 2005     Hypothyroidism     Abstracted 02     Osteopenia      Past Surgical History:   Procedure Laterality Date     ARTHROPLASTY KNEE Left 2015    Procedure: ARTHROPLASTY KNEE;  Surgeon: Daniel Mack MD;  Location: [facility] OR     CHOLECYSTECTOMY       COLONOSCOPY N/A 2016    Procedure: COLONOSCOPY;  Surgeon: Sage Berg MD;  Location:  GI     CYSTOSCOPY  2012    Procedure:CYSTOSCOPY; Surgeon:GAMA GRIDER; Location:[facility] OR     DAVINCI HYSTERECTOMY SUPRACERVICAL, SACROCOLPOPEXY, COMBINED  2012    Procedure:COMBINED DAVINCI HYSTERECTOMY SUPRACERVICAL, SACROCOLPOPEXY; DAVINCI LAPAROSCOPIC SUPRACERVICAL HYSTERECTOMY, BILATERAL SALPINGO-OOPHORECTOMY, SACROCOLPOPEXY  WITH COLOPLASTY MESH AND CYSTOPLASTY; Surgeon:GAMA GRIDER; Location:[facility] OR     HC REMOVAL OF TONSILS,<13 Y/O      Tonsils <12y.o.     HC TOOTH EXTRACTION W/FORCEP       ZZC NONSPECIFIC PROCEDURE      Cholecystectomy -- Abstracted 02     ZZHC COLONOSCOPY THRU STOMA, DIAGNOSTIC       Family History   Problem Relation Age of Onset     Alzheimer  Disease Father      Cancer Mother          non-Hodgkins lymphoma     Osteoporosis Mother      Arthritis Mother         RA     Gastrointestinal Disease Mother         UC     Thyroid Disease Mother      Osteoporosis Sister      Gastrointestinal Disease Sister         diverticulitis     Cardiovascular Maternal Aunt         MI     Breast Cancer Paternal Aunt      Social History     Socioeconomic History     Marital status:      Spouse name: Sage     Number of children: 1     Years of education: 18     Highest education level: Not on file   Occupational History     Occupation: Teacher     Employer: [employer]   Tobacco Use     Smoking status: Former Smoker     Packs/day: 1.00     Years: 25.00     Pack years: 25.00     Types: Cigarettes     Start date: 1962     Quit date: 1987     Years since quittin.3     Smokeless tobacco: Never Used     Tobacco comment:    Vaping Use     Vaping Use: Never used   Substance and Sexual Activity     Alcohol use: Yes     Alcohol/week: 10.0 standard drinks     Comment: 1-2 glasses of wine/week     Drug use: No     Sexual activity: Yes     Partners: Male     Birth control/protection: None     Comment: vas   Other Topics Concern      Service Not Asked     Blood Transfusions Not Asked     Caffeine Concern No     Occupational Exposure No     Hobby Hazards No     Sleep Concern No     Stress Concern No     Weight Concern No     Special Diet No     Back Care No     Exercise Yes     Comment: walk     Bike Helmet Not Asked     Seat Belt Yes     Self-Exams Not Asked     Parent/sibling w/ CABG, MI or angioplasty before 65F 55M? No   Social History Narrative     Not on file     Social Determinants of Health     Financial Resource Strain: Not on file   Food Insecurity: Not on file   Transportation Needs: Not on file   Physical Activity: Not on file   Stress: Not on file   Social Connections: Not on file   Intimate Partner Violence: Not on file    Housing Stability: Not on file       Allergies:  Patient has no known allergies.    Current Outpatient Medications   Medication Sig Dispense Refill     Biotin 5000 MCG CAPS        CALCIUM + D 600-200 MG-IU OR TABS 1 TABLET DAILY 30 0     calcium polycarbophil (FIBERCON) 625 MG tablet Take 2 tablets by mouth daily       COMPOUNDED NON-CONTROLLED SUBSTANCE (CMPD RX) - PHARMACY TO MIX COMPOUNDED MEDICATION Estradiol 0.02% in HRT heavy (cream base)  Place in tube and include vaginal applicator  Si/2 gm intravaginally at HS on Mon and Thurs pm as directed 42 g 3     GLUCOSAMINE CHONDROITIN COMPLX PO Take 1 tablet by mouth daily 750/600       levothyroxine (SYNTHROID/LEVOTHROID) 100 MCG tablet Take 1 tablet (100 mcg) by mouth in the morning. 90 tablet 3     levothyroxine (SYNTHROID/LEVOTHROID) 88 MCG tablet Take 1 tablet (88 mcg) by mouth daily 60 tablet 1     MULTI-DAY OR TABS one PO QD       sulfamethoxazole-trimethoprim (BACTRIM DS) 800-160 MG tablet Take 1 tablet by mouth 2 times daily 14 tablet 0       ROS: ROS: 10 point ROS neg other than the symptoms noted above in the HPI.    EXAM:  Vitals: /70 (BP Location: Left arm, Cuff Size: Adult Regular)   Wt 82.6 kg (182 lb 3.2 oz)   LMP 1997   Breastfeeding No   BMI 30.09 kg/m    BMI= Body mass index is 30.09 kg/m .  Constitutional: healthy, alert and no distress  Head: Normocephalic. No masses, lesions, tenderness or abnormalities  Neck: Neck supple. No adenopathy. Thyroid symmetric, normal size,, Carotids without bruits.  ENT: NEGATIVE for ear, mouth and throat problems  Cardiovascular: negative, PMI normal. No lifts, heaves, or thrills. RRR. No murmurs, clicks gallops or rub  Respiratory: negative, Percussion normal. Good diaphragmatic excursion. Lungs clear  Gastrointestinal: Abdomen soft, non-tender. BS normal. No masses, organomegaly  Genitourinary: Pelvic Exam:  External Genitalia:     Normal appearance for age, no discharge present, no  tenderness present, no inflammatory lesions present, color normal  Vagina:     Normal vaginal vault , no discharge present, no inflammatory lesions present, no masses present  Bladder:     Nontender to palpation  Urethra:   Urethral Body:  Urethra palpation normal, urethra structural support normal   Urethral Meatus:  No erythema or lesions present  Cervix:     Appearance healthy, no lesions present, nontender to palpation, no bleeding present  Uterus:     Uterus: firm, normal sized and nontender, absent uterus multip cx with gr 1 desent gr 2+ midline cystocele.   Adnexa:     No adnexal tenderness present, no adnexal masses present  Perineum:     Perineum within normal limits, no evidence of trauma, no rashes or skin lesions present  Anus:     Anus within normal limits, no hemorrhoids present  Inguinal Lymph Nodes:     No lymphadenopathy present  Pubic Hair:     Normal pubic hair distribution for age  Genitalia and Groin:     No rashes present, no lesions present, no areas of discoloration, no masses present    Musculoskeletalextremities normal- no gross deformities noted, gait normal and normal muscle tone  Integument: no suspicious lesions or rashes  Neurologic: Gait normal. Reflexes normal and symmetric. Sensation grossly WNL.  Psychiatric: mentation appears normal and affect normal/bright  Hematologic/Lymphatic/Immunologic: Normal cervical lymph nodes     ASSESSMENT:/PLAN:  (E07.9) Thyroid dysfunction  (primary encounter diagnosis)  Comment: cares and prev values rev  Plan: TSH with free T4 reflex, levothyroxine         (SYNTHROID/LEVOTHROID) 88 MCG tablet,         DISCONTINUED: levothyroxine         (SYNTHROID/LEVOTHROID) 88 MCG tablet        ordered        (Z01.818) Pre-op exam  Comment: normal preop  Plan: EKG 12-lead complete w/read - Clinics        n exam    (N81.11) Cystocele, midline  Comment: will refer to DR Olivares  Plan: cares disc    (N39.0) Recurrent UTI  Comment: will recheck UC  Plan: plan  rev        Xavier Austin M.D.

## 2022-04-25 NOTE — PROGRESS NOTES
HPI:  Kierra Mcmullen is a 72 year old white  female ,, vas and postmenopausal not on hormone replacement therapy who is status post da Erica robotic LSH/BSO, sacral colpopexy 12 who presents for evaluation of recurrent UTI;s and would also like  an annual exam and pap.  .  She is doing well at present.  The pt has had multiple basically assymptomatic UTI's and has seen Dr Connell for this.  Dr Connell has recommended vaginal estrogen in an effort to decrease the bacterial load.  THe pt has been hesitant to do this.  Risks, benefits, and alternative modes of therapy discussed at length. Pathophysiology of the disease process reviewed, all of the patients questions answered and informed consent obtained.  The pt states she would consider a 3 month trial and will RTC to reassess  At present she is sx free.  I have reviewed her most recent urine results  Self breast exam,  ACS screening mammogram recs, the use of 81 mg ASA to decrease the risk of heart disease, lipid screening, colon cancer screening recs and Dexa scan recs thoroughly reveiwed.      Past Medical History:   Diagnosis Date     Adenomatous polyp of colon 2011    needs conolonospy 2016     Hypothyroidism     Abstracted 02     Osteopenia      Past Surgical History:   Procedure Laterality Date     ARTHROPLASTY KNEE Left 2015    Procedure: ARTHROPLASTY KNEE;  Surgeon: Daniel Mack MD;  Location:  OR     C NONSPECIFIC PROCEDURE      Cholecystectomy -- Abstracted 02     CHOLECYSTECTOMY       COLONOSCOPY N/A 2016    Procedure: COLONOSCOPY;  Surgeon: Sage Berg MD;  Location:  GI     CYSTOSCOPY  2012    Procedure:CYSTOSCOPY; Surgeon:GAMA GRIDER; Location:[facility]     DAVINCI HYSTERECTOMY SUPRACERVICAL, SACROCOLPOPEXY, COMBINED  2012    Procedure:COMBINED DAVINCI HYSTERECTOMY SUPRACERVICAL, SACROCOLPOPEXY; DAVINCI LAPAROSCOPIC SUPRACERVICAL HYSTERECTOMY, BILATERAL SALPINGO-OOPHORECTOMY,  Medicare Preventive Visit Patient Instructions  Thank you for completing your Welcome to Medicare Visit or Medicare Annual Wellness Visit today  Your next wellness visit will be due in one year (4/26/2023)  The screening/preventive services that you may require over the next 5-10 years are detailed below  Some tests may not apply to you based off risk factors and/or age  Screening tests ordered at today's visit but not completed yet may show as past due  Also, please note that scanned in results may not display below  Preventive Screenings:  Service Recommendations Previous Testing/Comments   Colorectal Cancer Screening  · Colonoscopy    · Fecal Occult Blood Test (FOBT)/Fecal Immunochemical Test (FIT)  · Fecal DNA/Cologuard Test  · Flexible Sigmoidoscopy Age: 54-65 years old   Colonoscopy: every 10 years (May be performed more frequently if at higher risk)  OR  FOBT/FIT: every 1 year  OR  Cologuard: every 3 years  OR  Sigmoidoscopy: every 5 years  Screening may be recommended earlier than age 48 if at higher risk for colorectal cancer  Also, an individualized decision between you and your healthcare provider will decide whether screening between the ages of 74-80 would be appropriate   Colonoscopy: Not on file  FOBT/FIT: Not on file  Cologuard: Not on file  Sigmoidoscopy: Not on file    Screening Not Indicated     Prostate Cancer Screening Individualized decision between patient and health care provider in men between ages of 53-78   Medicare will cover every 12 months beginning on the day after your 50th birthday PSA: No results in last 5 years     Screening Not Indicated     Hepatitis C Screening Once for adults born between [facility]  More frequently in patients at high risk for Hepatitis C Hep C Antibody: Not on file        Diabetes Screening 1-2 times per year if you're at risk for diabetes or have pre-diabetes Fasting glucose: 153 mg/dL   A1C: 7 1 %    Screening Not Indicated  History Diabetes Cholesterol Screening Once every 5 years if you don't have a lipid disorder  May order more often based on risk factors  Lipid panel: 03/16/2022    Screening Not Indicated  History Lipid Disorder      Other Preventive Screenings Covered by Medicare:  1  Abdominal Aortic Aneurysm (AAA) Screening: covered once if your at risk  You're considered to be at risk if you have a family history of AAA or a male between the age of 73-68 who smoking at least 100 cigarettes in your lifetime  2  Lung Cancer Screening: covers low dose CT scan once per year if you meet all of the following conditions: (1) Age 50-69; (2) No signs or symptoms of lung cancer; (3) Current smoker or have quit smoking within the last 15 years; (4) You have a tobacco smoking history of at least 30 pack years (packs per day x number of years you smoked); (5) You get a written order from a healthcare provider  3  Glaucoma Screening: covered annually if you're considered high risk: (1) You have diabetes OR (2) Family history of glaucoma OR (3)  aged 48 and older OR (3)  American aged 72 and older  3  Osteoporosis Screening: covered every 2 years if you meet one of the following conditions: (1) Have a vertebral abnormality; (2) On glucocorticoid therapy for more than 3 months; (3) Have primary hyperparathyroidism; (4) On osteoporosis medications and need to assess response to drug therapy  5  HIV Screening: covered annually if you're between the age of 12-76  Also covered annually if you are younger than 13 and older than 72 with risk factors for HIV infection  For pregnant patients, it is covered up to 3 times per pregnancy      Immunizations:  Immunization Recommendations   Influenza Vaccine Annual influenza vaccination during flu season is recommended for all persons aged >= 6 months who do not have contraindications   Pneumococcal Vaccine (Prevnar and Pneumovax)  * Prevnar = PCV13  * Pneumovax = PPSV23 Adults 25-60 years old: SACROCOLPOPEXY  WITH COLOPLASTY MESH AND CYSTOPLASTY; Surgeon:GAMA GRIDER; Location:[facility] OR     HC COLONOSCOPY THRU STOMA, DIAGNOSTIC       HC REMOVAL OF TONSILS,<13 Y/O      Tonsils <12y.o.     HC TOOTH EXTRACTION W/FORCEP       Family History   Problem Relation Age of Onset     Alzheimer Disease Father      CANCER Mother       non-Hodgkins lymphoma     OSTEOPOROSIS Mother      Arthritis Mother      RA     GASTROINTESTINAL DISEASE Mother      UC     OSTEOPOROSIS Sister      GASTROINTESTINAL DISEASE Sister      diverticulitis     Cardiovascular Maternal Aunt      MI     Breast Cancer Paternal Aunt      Social History     Social History     Marital status:      Spouse name: Sage     Number of children: 1     Years of education: 18     Occupational History     [employer]     Social History Main Topics     Smoking status: Former Smoker     Packs/day: 1.00     Years: 25.00     Types: Cigarettes     Quit date: 1987     Smokeless tobacco: Never Used      Comment:      Alcohol use 6.0 oz/week      Comment: 2-3 glasses of wine per wk     Drug use: No     Sexual activity: Yes     Partners: Male     Birth control/ protection: Surgical      Comment: vas     Other Topics Concern     Caffeine Concern No     Occupational Exposure No     Hobby Hazards No     Sleep Concern No     Stress Concern No     Weight Concern No     Special Diet No     Back Care No     Exercise Yes     walk     Seat Belt Yes     Social History Narrative       Allergies:  Sulfa drugs    Current Outpatient Prescriptions   Medication Sig Dispense Refill     COMPOUND (CMPD RX) - PHARMACY TO MIX COMPOUNDED MEDICATION Estradiol 0.011% in HRT heavy (cream base)  Place in tube and include vaginal applicator  Si gm intravaginally at HS on Mon and Thurs pm as directed 42 g 3     levothyroxine (SYNTHROID/LEVOTHROID) 100 MCG tablet Take 1 tablet (100 mcg) by mouth daily 90 tablet 3     calcium polycarbophil  "(FIBERCON) 625 MG tablet Take 2 tablets by mouth daily       Biotin 5000 MCG CAPS        GLUCOSAMINE CHONDROITIN COMPLX PO Take 2 tablets by mouth daily. 750/600 .        CALCIUM + D 600-200 MG-IU OR TABS 1 TABLET DAILY (Patient taking differently: 2 TABLETS DAILY) 30 0     MULTI-DAY OR TABS one PO QD         ROS:  C: NEGATIVE for fever, chills, change in weight  I: NEGATIVE for worrisome rashes, moles or lesions. Derm mole check recommended  E: NEGATIVE for vision changes or irritation  E/M: NEGATIVE for ear, mouth and throat problems  R: NEGATIVE for significant cough or SOB  B: NEGATIVE for masses, tenderness or discharge  CV: NEGATIVE for chest pain, palpitations or peripheral edema  GI: NEGATIVE for nausea, abdominal pain, heartburn, or change in bowel habits   female: Amennorhea  : pos for recurrent UTI as noted  M: NEGATIVE for significant arthralgias or myalgia  N: NEGATIVE for weakness, dizziness or paresthesias  E: NEGATIVE for temperature intolerance, skin/hair changes  H: NEGATIVE for bleeding problems  P: NEGATIVE for changes in mood or affect    EXAM:  Vitals: /76 (BP Location: Right arm, Patient Position: Chair, Cuff Size: Adult Regular)  Ht 5' 6\" (1.676 m)  Wt 193 lb 6.4 oz (87.7 kg)  LMP 07/22/1997  BMI 31.22 kg/m2  BMI= Body mass index is 31.22 kg/(m^2).  Constitutional: healthy, alert and no distress  Head: Normocephalic. No masses, lesions, tenderness or abnormalities  Neck: Neck supple. No adenopathy. Thyroid symmetric, normal size,, Carotids without bruits.  ENT: NEGATIVE for ear, mouth and throat problems  Breast:  breasts symmetric, no dominant or suspicious mass, no skin or nipple changes, no axillary adenopathy, unchanged from previous exam or self exam in taught and encouraged  Cardiovascular: negative, PMI normal. No lifts, heaves, or thrills. RRR. No murmurs, clicks gallops or rub  Respiratory: negative, Percussion normal. Good diaphragmatic excursion. Lungs " 1-3 doses may be recommended based on certain risk factors  Adults 72 years old: Prevnar (PCV13) vaccine recommended followed by Pneumovax (PPSV23) vaccine  If already received PPSV23 since turning 65, then PCV13 recommended at least one year after PPSV23 dose  Hepatitis B Vaccine 3 dose series if at intermediate or high risk (ex: diabetes, end stage renal disease, liver disease)   Tetanus (Td) Vaccine - COST NOT COVERED BY MEDICARE PART B Following completion of primary series, a booster dose should be given every 10 years to maintain immunity against tetanus  Td may also be given as tetanus wound prophylaxis  Tdap Vaccine - COST NOT COVERED BY MEDICARE PART B Recommended at least once for all adults  For pregnant patients, recommended with each pregnancy  Shingles Vaccine (Shingrix) - COST NOT COVERED BY MEDICARE PART B  2 shot series recommended in those aged 48 and above     Health Maintenance Due:  There are no preventive care reminders to display for this patient  Immunizations Due:      Topic Date Due    DTaP,Tdap,and Td Vaccines (1 - Tdap) Never done    COVID-19 Vaccine (3 - Booster for Moderna series) 07/19/2021    Influenza Vaccine (1) 09/01/2021     Advance Directives   What are advance directives? Advance directives are legal documents that state your wishes and plans for medical care  These plans are made ahead of time in case you lose your ability to make decisions for yourself  Advance directives can apply to any medical decision, such as the treatments you want, and if you want to donate organs  What are the types of advance directives? There are many types of advance directives, and each state has rules about how to use them  You may choose a combination of any of the following:  · Living will: This is a written record of the treatment you want  You can also choose which treatments you do not want, which to limit, and which to stop at a certain time   This includes surgery, medicine, IV fluid, and tube feedings  · Durable power of  for healthcare [facility]): This is a written record that states who you want to make healthcare choices for you when you are unable to make them for yourself  This person, called a proxy, is usually a family member or a friend  You may choose more than 1 proxy  · Do not resuscitate (DNR) order:  A DNR order is used in case your heart stops beating or you stop breathing  It is a request not to have certain forms of treatment, such as CPR  A DNR order may be included in other types of advance directives  · Medical directive: This covers the care that you want if you are in a coma, near death, or unable to make decisions for yourself  You can list the treatments you want for each condition  Treatment may include pain medicine, surgery, blood transfusions, dialysis, IV or tube feedings, and a ventilator (breathing machine)  · Values history: This document has questions about your views, beliefs, and how you feel and think about life  This information can help others choose the care that you would choose  Why are advance directives important? An advance directive helps you control your care  Although spoken wishes may be used, it is better to have your wishes written down  Spoken wishes can be misunderstood, or not followed  Treatments may be given even if you do not want them  An advance directive may make it easier for your family to make difficult choices about your care  Weight Management   Why it is important to manage your weight:  Being overweight increases your risk of health conditions such as heart disease, high blood pressure, type 2 diabetes, and certain types of cancer  It can also increase your risk for osteoarthritis, sleep apnea, and other respiratory problems  Aim for a slow, steady weight loss  Even a small amount of weight loss can lower your risk of health problems    How to lose weight safely:  A safe and healthy way to lose weight is clear  Gastrointestinal: Abdomen soft, non-tender. BS normal. No masses, organomegaly  Genitourinary: Cervix: excellent apical support no cx lesions seen  There is a gr 2 midline cystocele that with strain extends to the introitus  Normal appearing postmenopausal vaginal epithelium  Ovaries:  Surgically absent  Uterus: surgically absent  Cervix as noted  Rectal:  Normal sphincter tone  Min gr 1 rectocele  Musculoskeletalextremities normal- no gross deformities noted, gait normal and normal muscle tone  Integument: no suspicious lesions or rashes  Neurologic: Gait normal. Reflexes normal and symmetric. Sensation grossly WNL.  Psychiatric: mentation appears normal and affect normal/bright  Hematologic/Lymphatic/Immunologic: Normal cervical lymph nodes     ASSESSMENT:/PLAN:  (N39.0) Recurrent UTI  (primary encounter diagnosis)  Comment: we discussed a trial of vaginal estrogen  Pt will tract UTI sx and re-eval in 3 months  Pt will also use a finger vaginally to elevate the cystocele to ensure complete emptying  Plan: COMPOUND (CMPD RX) - PHARMACY TO MIX COMPOUNDED        MEDICATION, *UA reflex to Microscopic and         Culture ([facility] (except         [place] and [place]), *UA reflex to         Microscopic and Culture ([facility] [place]         [facility] (except [place] and [place])        ordered    (N81.10) Prolapse of vaginal wall  Comment: as above  Plan: reassess in 2 months the pt is o/w assymptomatic    (Z71.89) Advance Care Planning  Comment: as tim  Plan: done    (Z01.411) Encounter for gynecological examination with abnormal finding  Comment: as above  Plan: done    (K46.9) Enterocele  Comment: well controlled  Plan: as above  Pt is assymptomatic      Xavier Austin M.D.               to eat fewer calories and get regular exercise  You can lose up about 1 pound a week by decreasing the number of calories you eat by 500 calories each day  Healthy meal plan for weight management:  A healthy meal plan includes a variety of foods, contains fewer calories, and helps you stay healthy  A healthy meal plan includes the following:  · Eat whole-grain foods more often  A healthy meal plan should contain fiber  Fiber is the part of grains, fruits, and vegetables that is not broken down by your body  Whole-grain foods are healthy and provide extra fiber in your diet  Some examples of whole-grain foods are whole-wheat breads and pastas, oatmeal, brown rice, and bulgur  · Eat a variety of vegetables every day  Include dark, leafy greens such as spinach, kale, karishma greens, and mustard greens  Eat yellow and orange vegetables such as carrots, sweet potatoes, and winter squash  · Eat a variety of fruits every day  Choose fresh or canned fruit (canned in its own juice or light syrup) instead of juice  Fruit juice has very little or no fiber  · Eat low-fat dairy foods  Drink fat-free (skim) milk or 1% milk  Eat fat-free yogurt and low-fat cottage cheese  Try low-fat cheeses such as mozzarella and other reduced-fat cheeses  · Choose meat and other protein foods that are low in fat  Choose beans or other legumes such as split peas or lentils  Choose fish, skinless poultry (chicken or turkey), or lean cuts of red meat (beef or pork)  Before you cook meat or poultry, cut off any visible fat  · Use less fat and oil  Try baking foods instead of frying them  Add less fat, such as margarine, sour cream, regular salad dressing and mayonnaise to foods  Eat fewer high-fat foods  Some examples of high-fat foods include french fries, doughnuts, ice cream, and cakes  · Eat fewer sweets  Limit foods and drinks that are high in sugar  This includes candy, cookies, regular soda, and sweetened drinks    Exercise: Exercise at least 30 minutes per day on most days of the week  Some examples of exercise include walking, biking, dancing, and swimming  You can also fit in more physical activity by taking the stairs instead of the elevator or parking farther away from stores  Ask your healthcare provider about the best exercise plan for you  © Copyright [employer] 2018 Information is for End User's use only and may not be sold, redistributed or otherwise used for commercial purposes   All illustrations and images included in CareNotes® are the copyrighted property of [employer]  or [address] [place] [address]

## 2022-04-27 ENCOUNTER — LAB (OUTPATIENT)
Dept: LAB | Facility: CLINIC | Age: 77
End: 2022-04-27
Payer: COMMERCIAL

## 2022-04-27 DIAGNOSIS — N39.0 RECURRENT UTI: ICD-10-CM

## 2022-04-27 LAB
ALBUMIN UR-MCNC: NEGATIVE MG/DL
APPEARANCE UR: CLEAR
BILIRUB UR QL STRIP: NEGATIVE
COLOR UR AUTO: YELLOW
GLUCOSE UR STRIP-MCNC: NEGATIVE MG/DL
HGB UR QL STRIP: NEGATIVE
KETONES UR STRIP-MCNC: NEGATIVE MG/DL
LEUKOCYTE ESTERASE UR QL STRIP: ABNORMAL
NITRATE UR QL: NEGATIVE
PH UR STRIP: 6.5 [PH] (ref 5–7)
RBC #/AREA URNS AUTO: ABNORMAL /HPF
RENAL EPI CELLS #/AREA URNS HPF: ABNORMAL /HPF
SP GR UR STRIP: 1.01 (ref 1–1.03)
SQUAMOUS #/AREA URNS AUTO: ABNORMAL /LPF
UROBILINOGEN UR STRIP-ACNC: 0.2 E.U./DL
WBC #/AREA URNS AUTO: ABNORMAL /HPF

## 2022-04-27 PROCEDURE — 87086 URINE CULTURE/COLONY COUNT: CPT

## 2022-04-27 PROCEDURE — 81001 URINALYSIS AUTO W/SCOPE: CPT

## 2022-04-28 ENCOUNTER — OFFICE VISIT (OUTPATIENT)
Dept: OBGYN | Facility: CLINIC | Age: 77
End: 2022-04-28
Payer: COMMERCIAL

## 2022-04-28 VITALS — BODY MASS INDEX: 30.22 KG/M2 | DIASTOLIC BLOOD PRESSURE: 72 MMHG | SYSTOLIC BLOOD PRESSURE: 112 MMHG | WEIGHT: 183 LBS

## 2022-04-28 DIAGNOSIS — N81.11 CYSTOCELE, MIDLINE: Primary | ICD-10-CM

## 2022-04-28 DIAGNOSIS — K46.9 ENTEROCELE: ICD-10-CM

## 2022-04-28 PROCEDURE — 99213 OFFICE O/P EST LOW 20 MIN: CPT | Performed by: OBSTETRICS & GYNECOLOGY

## 2022-04-28 NOTE — NURSING NOTE
"Chief Complaint   Patient presents with     Consult       Initial /72   Wt 83 kg (183 lb)   LMP 1997   BMI 30.22 kg/m   Estimated body mass index is 30.22 kg/m  as calculated from the following:    Height as of 22: 1.657 m (5' 5.25\").    Weight as of this encounter: 83 kg (183 lb).  BP completed using cuff size: regular    Questioned patient about current smoking habits.  Pt. has never smoked.          The following HM Due: NONE      The following patient reported/Care Every where data was sent to:  P ABSTRACT QUALITY INITIATIVES [[id number]]        [facility]                 "

## 2022-04-28 NOTE — PATIENT INSTRUCTIONS
You can reach your [facility] Care Team any time of the day by calling [phone]. This number will put you in touch with the 24 hour nurse line if the clinic is closed.    To contact your OB/GYN Station Coordinator/Surgery Scheduler please call [phone]. This is a direct number for your care team between 8 a.m. and 4 p.m. Monday through Friday.    [facility] is open for your convenience:  Monday through Friday 8 a.m. to 6 p.m.  Closed weekends and all major holidays.

## 2022-04-29 LAB — BACTERIA UR CULT: NO GROWTH

## 2022-04-30 NOTE — RESULT ENCOUNTER NOTE
I spoke to the patient today and reviewed the results of the negative urine culture.  She also had a small amount of pink vaginal discharge with the pessary in place.  At this point I have advised restarting the vaginal estrogen cream and I have asked that she use a gram intravaginally on Monday and Thursday night.  I Corin have her wait 2 weeks before restarting to use the pessary.  She did not have any pain or discomfort with the pessary and had acceptable bowel and bladder function without symptoms of prolapse.  I would like to see her back in a month to reassess or sooner should concerns arise

## 2022-05-02 NOTE — PROGRESS NOTES
Kierra Mcmullen is a 77 year old white female  ,menopause and status post robotic supracervical hysterectomy BSO sacrocolpopexy 2012 for pelvic floor relaxation  who presents for follow-up of recurrent UTIs, treatment options for recurring grade 2 midline cystocele.  Patient had used a 2-1/2 inch diaphragm pessary in the past but felt discomfort with it and also noticed a small amount of spotting with insertion and removal.  I did give her the names of several-year-old gynecologist to consult with and at this point the patient has strong feelings against doing any surgical intervention.  I discussed with she and her  the option of going to a smaller pessary or an alternative size pessary and they are amenable to doing this.  She states when the pessary is in she does have acceptable bladder control.  I did also review her negative follow-up UC.    Past Medical History:   Diagnosis Date     Adenomatous polyp of colon 2011    needs conolonospy 2016     Arthritis      Hypothyroidism     Abstracted 02     Osteopenia      Current Outpatient Medications   Medication     Biotin 5000 MCG CAPS     CALCIUM + D 600-200 MG-IU OR TABS     calcium polycarbophil (FIBERCON) 625 MG tablet     COMPOUNDED NON-CONTROLLED SUBSTANCE (CMPD RX) - PHARMACY TO MIX COMPOUNDED MEDICATION     GLUCOSAMINE CHONDROITIN COMPLX PO     levothyroxine (SYNTHROID/LEVOTHROID) 88 MCG tablet     MULTI-DAY OR TABS     sulfamethoxazole-trimethoprim (BACTRIM DS) 800-160 MG tablet     No current facility-administered medications for this visit.     /72   Wt 83 kg (183 lb)   LMP 1997   BMI 30.22 kg/m    Constitutional: healthy, alert and no distress  Genitourinary: Normal external genitalia without lesions and speculum exam reveals a grade 2+ midline cystocele with grade 1 cervical stump descent.  There is atrophic change vaginally.  Bimanual exam the uterus is absent adnexa without masses enlargement or  tenderness.  Acceptable posterior compartment support is present.  I did refit her with a number three 2-1/4 inch diaphragm type pessary.  I did dress it with Trimo-Carr gel.  The patient is capable of insertion and removal.  This did result in good pelvic floor support and the patient states that she did not feel it when it was properly positioned    (N81.11) Cystocele, midline  (primary encounter diagnosis)  Comment: Patient is declining surgical revision at this time and we will try the smaller 2-1/4 inch diaphragm pessary.  She will inserted in the morning and remove it at night.  She will use it with the Trimo-Acrr gel  Plan: She did call back stating that she did notice a small amount of irritation with removal.  I am going to have her use the vaginal estrogen compounded cream that she has.  I will have her use 1 g intravaginally at bedtime on Monday and Thursday night.  After 3 weeks of use she will attempt to reuse the pessary.  I will see her back in 1 month for follow-up or sooner should concerns arise.  I believe the spotting and pink discharge is related to atrophic change in the vagina.  I do not see any signs of other atypia    (K46.9) Enterocele  Comment: As above  Plan: Written plan given

## 2022-06-07 ENCOUNTER — OFFICE VISIT (OUTPATIENT)
Dept: OBGYN | Facility: CLINIC | Age: 77
End: 2022-06-07
Payer: COMMERCIAL

## 2022-06-07 VITALS — DIASTOLIC BLOOD PRESSURE: 74 MMHG | WEIGHT: 183 LBS | BODY MASS INDEX: 30.22 KG/M2 | SYSTOLIC BLOOD PRESSURE: 122 MMHG

## 2022-06-07 DIAGNOSIS — N81.11 CYSTOCELE, MIDLINE: ICD-10-CM

## 2022-06-07 DIAGNOSIS — E07.9 THYROID DYSFUNCTION: Primary | ICD-10-CM

## 2022-06-07 DIAGNOSIS — K46.9 ENTEROCELE: ICD-10-CM

## 2022-06-07 PROCEDURE — 99213 OFFICE O/P EST LOW 20 MIN: CPT | Performed by: OBSTETRICS & GYNECOLOGY

## 2022-06-07 NOTE — NURSING NOTE
"Chief Complaint   Patient presents with     Pessary Check/Fit/Insert       Initial /74   Wt 83 kg (183 lb)   LMP 1997   BMI 30.22 kg/m   Estimated body mass index is 30.22 kg/m  as calculated from the following:    Height as of 22: 1.657 m (5' 5.25\").    Weight as of this encounter: 83 kg (183 lb).  BP completed using cuff size: regular    Questioned patient about current smoking habits.  Pt. has never smoked.          The following HM Due: NONE      The following patient reported/Care Every where data was sent to:  P ABSTRACT QUALITY INITIATIVES [[id number]]        [facility]                 "

## 2022-06-07 NOTE — PATIENT INSTRUCTIONS
You can reach your [facility] Care Team any time of the day by calling [phone]. This number will put you in touch with the 24 hour nurse line if the clinic is closed.    To contact your OB/GYN Station Coordinator/Surgery Scheduler please call [phone]. This is a direct number for your care team between 8 a.m. and 4 p.m. Monday through Friday.    [facility] is open for your convenience:  Monday through Friday 8 a.m. to 6 p.m.  Closed weekends and all major holidays.

## 2022-06-10 ENCOUNTER — MEDICAL CORRESPONDENCE (OUTPATIENT)
Dept: HEALTH INFORMATION MANAGEMENT | Facility: CLINIC | Age: 77
End: 2022-06-10
Payer: COMMERCIAL

## 2022-06-15 ENCOUNTER — HOSPITAL ENCOUNTER (OUTPATIENT)
Dept: MAMMOGRAPHY | Facility: CLINIC | Age: 77
Discharge: HOME | End: 2022-06-15
Attending: OBSTETRICS & GYNECOLOGY | Admitting: OBSTETRICS & GYNECOLOGY
Payer: COMMERCIAL

## 2022-06-15 DIAGNOSIS — Z12.31 VISIT FOR SCREENING MAMMOGRAM: ICD-10-CM

## 2022-06-15 PROCEDURE — 77067 SCR MAMMO BI INCL CAD: CPT

## 2022-07-05 DIAGNOSIS — E07.9 THYROID DYSFUNCTION: Primary | ICD-10-CM

## 2022-07-05 DIAGNOSIS — Z87.440 PERSONAL HISTORY OF URINARY TRACT INFECTION: ICD-10-CM

## 2022-07-05 DIAGNOSIS — R39.15 URGENCY OF URINATION: ICD-10-CM

## 2022-07-06 ENCOUNTER — LAB (OUTPATIENT)
Dept: LAB | Facility: CLINIC | Age: 77
End: 2022-07-06
Payer: COMMERCIAL

## 2022-07-06 DIAGNOSIS — R39.15 URGENCY OF URINATION: ICD-10-CM

## 2022-07-06 DIAGNOSIS — E07.9 THYROID DYSFUNCTION: ICD-10-CM

## 2022-07-06 DIAGNOSIS — Z87.440 PERSONAL HISTORY OF URINARY TRACT INFECTION: ICD-10-CM

## 2022-07-06 LAB
ALBUMIN UR-MCNC: NEGATIVE MG/DL
APPEARANCE UR: CLEAR
BACTERIA #/AREA URNS HPF: ABNORMAL /HPF
BILIRUB UR QL STRIP: NEGATIVE
COLOR UR AUTO: YELLOW
GLUCOSE UR STRIP-MCNC: NEGATIVE MG/DL
HGB UR QL STRIP: NEGATIVE
KETONES UR STRIP-MCNC: NEGATIVE MG/DL
LEUKOCYTE ESTERASE UR QL STRIP: ABNORMAL
NITRATE UR QL: NEGATIVE
PH UR STRIP: 7 [PH] (ref 5–7)
RBC #/AREA URNS AUTO: ABNORMAL /HPF
SP GR UR STRIP: 1.02 (ref 1–1.03)
SQUAMOUS #/AREA URNS AUTO: ABNORMAL /LPF
UROBILINOGEN UR STRIP-ACNC: 0.2 E.U./DL
WBC #/AREA URNS AUTO: ABNORMAL /HPF

## 2022-07-06 PROCEDURE — 81001 URINALYSIS AUTO W/SCOPE: CPT

## 2022-07-06 PROCEDURE — 36415 COLL VENOUS BLD VENIPUNCTURE: CPT

## 2022-07-06 PROCEDURE — 84443 ASSAY THYROID STIM HORMONE: CPT

## 2022-07-06 PROCEDURE — 87086 URINE CULTURE/COLONY COUNT: CPT

## 2022-07-07 LAB — TSH SERPL DL<=0.005 MIU/L-ACNC: 0.52 MU/L (ref 0.4–4)

## 2022-07-07 NOTE — RESULT ENCOUNTER NOTE
I spoke to the patient regarding the results of her urinalysis.  She is presently asymptomatic.  I will wait for the results of the culture and then treat as appropriate.  We also again reviewed her previous thyroid function testing from June 2022.  She did have repeat thyroid functions done yesterday and that results still pending.  I will review that result and make appropriate recommendations based on those findings.  The patient is getting due for a refill on her thyroid replacement.

## 2022-07-07 NOTE — RESULT ENCOUNTER NOTE
I spoke to the patient about her urine culture results.  Her urine culture is negative she is asymptomatic and no further therapy be undergone.  I also reviewed her normal TSH value.  The patient is coming in next week for an appointment and we can refill her levothyroxine at that time

## 2022-07-08 LAB — BACTERIA UR CULT: NORMAL

## 2022-07-14 ENCOUNTER — OFFICE VISIT (OUTPATIENT)
Dept: OBGYN | Facility: CLINIC | Age: 77
End: 2022-07-14
Payer: COMMERCIAL

## 2022-07-14 VITALS — DIASTOLIC BLOOD PRESSURE: 70 MMHG | BODY MASS INDEX: 29.91 KG/M2 | SYSTOLIC BLOOD PRESSURE: 128 MMHG | WEIGHT: 181.1 LBS

## 2022-07-14 DIAGNOSIS — E07.9 THYROID DYSFUNCTION: ICD-10-CM

## 2022-07-14 DIAGNOSIS — N39.0 RECURRENT UTI: ICD-10-CM

## 2022-07-14 DIAGNOSIS — N81.10 PROLAPSE OF VAGINAL WALL: ICD-10-CM

## 2022-07-14 DIAGNOSIS — N81.11 CYSTOCELE, MIDLINE: Primary | ICD-10-CM

## 2022-07-14 DIAGNOSIS — R82.81 PYURIA, STERILE: ICD-10-CM

## 2022-07-14 PROCEDURE — 99213 OFFICE O/P EST LOW 20 MIN: CPT | Performed by: OBSTETRICS & GYNECOLOGY

## 2022-07-14 RX ORDER — LEVOTHYROXINE SODIUM 88 UG/1
TABLET ORAL
Qty: 60 TABLET | Refills: 1 | Status: SHIPPED | OUTPATIENT
Start: 2022-07-14 | End: 2022-09-20 | Stop reason: DRUGHIGH

## 2022-07-14 RX ORDER — LEVOTHYROXINE SODIUM 75 UG/1
TABLET ORAL
Qty: 60 TABLET | Refills: 1 | Status: SHIPPED | OUTPATIENT
Start: 2022-07-14 | End: 2022-09-20

## 2022-07-14 NOTE — PATIENT INSTRUCTIONS
You can reach your [facility] Care Team any time of the day by calling [phone]. This number will put you in touch with the 24 hour nurse line if the clinic is closed.    To contact your OB/GYN Station Coordinator/Surgery Scheduler please call [phone]. This is a direct number for your care team between 8 a.m. and 4 p.m. Monday through Friday.    [facility] is open for your convenience:  Monday through Friday 8 a.m. to 6 p.m.  Closed weekends and all major holidays.

## 2022-07-14 NOTE — NURSING NOTE
"Chief Complaint   Patient presents with     Pessary Check/Fit/Insert     RECHECK     Discuss thyroid medication       Initial /70   Wt 82.1 kg (181 lb 1.6 oz)   LMP 1997   BMI 29.91 kg/m   Estimated body mass index is 29.91 kg/m  as calculated from the following:    Height as of 22: 1.657 m (5' 5.25\").    Weight as of this encounter: 82.1 kg (181 lb 1.6 oz).  BP completed using cuff size: regular    Questioned patient about current smoking habits.  Pt. quit smoking some time ago.          The following HM Due: NONE         "

## 2022-07-14 NOTE — PROGRESS NOTES
Kierra Mcmullen is a 77 year old female  menopausal status post robotic supracervical hysterectomy BSO sacrocolpopexy on 2012 for pelvic floor relaxation who has a history of recurrent UTIs who on 2022 underwent a right total knee arthroplasty  with Dr. Castillo at [facility] would been using a #3 diaphragm type pessary for a recurrent cystocele enterocele presents today for a follow-up patient also has been taking levothyroxine 88 mcg for hypothyroidism.  She had a recent TSH checked and I reviewed those values with her.  The patient states otherwise she is feeling well.  She is recovering well after her right knee arthroplasty.  In reviewing with her the TSH results her TSH is at the lower range of normal.  We discussed thyroid function and the reasons for this and after reviewing the risk benefits and alternatives we discussed alternating levothyroxine 88 mcg daily with levothyroxine 75 mcg daily and rechecking thyroid functions in 4 to 6 weeks.  The patient would like to do this in an effort to have her TSH more in the middle of the normal range.  The pessary is working very well for her and she is comfortable with removal and insertion.  She is using the compounded vaginal estrogen cream twice weekly without side effects and would like to continue to do so.  We reviewed her recent urine culture and sterile pyuria result she is having no bladder symptoms.  We discussed the pathophysiology of sterile pyuria    Past Medical History:   Diagnosis Date     Adenomatous polyp of colon 2011    needs conolonospy 2016     Arthritis      Hypothyroidism     Abstracted 02     Osteopenia      Current Outpatient Medications   Medication     Biotin 5000 MCG CAPS     CALCIUM + D 600-200 MG-IU OR TABS     calcium polycarbophil (FIBERCON) 625 MG tablet     COMPOUNDED NON-CONTROLLED SUBSTANCE (CMPD RX) - PHARMACY TO MIX COMPOUNDED MEDICATION     GLUCOSAMINE CHONDROITIN COMPLX PO     levothyroxine  (SYNTHROID/LEVOTHROID) 75 MCG tablet     levothyroxine (SYNTHROID/LEVOTHROID) 88 MCG tablet     MULTI-DAY OR TABS     sulfamethoxazole-trimethoprim (BACTRIM DS) 800-160 MG tablet     No current facility-administered medications for this visit.     /70   Wt 82.1 kg (181 lb 1.6 oz)   LMP 07/22/1997   BMI 29.91 kg/m    Constitutional: healthy, alert and no distress  Genitourinary: Normal external genitalia without lesions and #3 diaphragm type pessary was removed cleaned with soap and water.  Speculum exam reveals a previously noted cystocele to be present.  The cervical stump was intact.  I saw no evidence of any ulceration or irritation.  #3 diaphragm pessary was redressed with Trimo-Carr gel replaced and resulted in good pelvic floor support    (N81.11) Cystocele, midline  (primary encounter diagnosis)  Comment: Symptoms are well controlled with a diaphragm pessary.  The patient declines any surgical intervention at this time  Plan: She will continue to change it twice weekly.  She like to continue with the vaginal estrogen cream I gave her a refill prescription for this.  I will see her back in 2 months for follow-up.  If the patient continues to do well may lengthen the follow-up to every 3 to 4 months    (E07.9) Thyroid dysfunction  Comment: We will change her levothyroxine replacement to levothyroxine 88 mcg every Monday Wednesday and Friday and alternating with levothyroxine 75 mcg on Tuesday Thursday Saturday and Sunday.  We will recheck TSH in 4 to 6 weeks with appropriate therapy to follow.  Refills given  Plan: levothyroxine (SYNTHROID/LEVOTHROID) 75 MCG         tablet, levothyroxine (SYNTHROID/LEVOTHROID) 88        MCG tablet, TSH with free T4 reflex        Detailed plan reviewed the patient and her  understand    (N39.0) Recurrent UTI  Comment: Patient is presently doing well is asymptomatic no further therapy at this time.  If she has recurrence of dysuria or other related symptoms she  will call and a urine culture analysis will be performed with appropriate therapy to follow.  I will treat based on the results of the urine culture understanding she has a history of sterile pyuria  Plan: COMPOUNDED NON-CONTROLLED SUBSTANCE (CMPD RX) -        PHARMACY TO MIX COMPOUNDED MEDICATION        Refill given    (N81.10) Prolapse of vaginal wall  Comment: Symptoms well controlled with a #3 diaphragm pessary  Plan: Continue with above plan    (R82.81) Pyuria, sterile  Comment: As above  Plan: As above

## 2022-07-16 NOTE — ADDENDUM NOTE
Addended by: NEWTON BACK on: 4/1/2021 04:46 PM     Modules accepted: Orders    
no gross abnormalities

## 2022-07-25 ENCOUNTER — LAB (OUTPATIENT)
Dept: LAB | Facility: CLINIC | Age: 77
End: 2022-07-25
Payer: COMMERCIAL

## 2022-07-25 ENCOUNTER — TELEPHONE (OUTPATIENT)
Dept: OBGYN | Facility: CLINIC | Age: 77
End: 2022-07-25

## 2022-07-25 DIAGNOSIS — N39.0 RECURRENT UTI: ICD-10-CM

## 2022-07-25 DIAGNOSIS — R30.0 DYSURIA: ICD-10-CM

## 2022-07-25 DIAGNOSIS — R30.0 DYSURIA: Primary | ICD-10-CM

## 2022-07-25 LAB
ALBUMIN UR-MCNC: NEGATIVE MG/DL
APPEARANCE UR: ABNORMAL
BACTERIA #/AREA URNS HPF: ABNORMAL /HPF
BILIRUB UR QL STRIP: NEGATIVE
COLOR UR AUTO: YELLOW
GLUCOSE UR STRIP-MCNC: NEGATIVE MG/DL
HGB UR QL STRIP: NEGATIVE
KETONES UR STRIP-MCNC: NEGATIVE MG/DL
LEUKOCYTE ESTERASE UR QL STRIP: ABNORMAL
NITRATE UR QL: NEGATIVE
PH UR STRIP: 5.5 [PH] (ref 5–7)
RBC #/AREA URNS AUTO: ABNORMAL /HPF
SP GR UR STRIP: 1.01 (ref 1–1.03)
UROBILINOGEN UR STRIP-ACNC: 0.2 E.U./DL
WBC #/AREA URNS AUTO: ABNORMAL /HPF

## 2022-07-25 PROCEDURE — 81001 URINALYSIS AUTO W/SCOPE: CPT

## 2022-07-25 PROCEDURE — 87086 URINE CULTURE/COLONY COUNT: CPT

## 2022-07-25 PROCEDURE — 87186 SC STD MICRODIL/AGAR DIL: CPT

## 2022-07-25 NOTE — TELEPHONE ENCOUNTER
Please advise the patient that I placed an order for a urine analysis urine culture in the lab.  She can stop in at her convenience to have this collected.  I will contact her when the results are available with appropriate therapy to follow thank you

## 2022-07-25 NOTE — TELEPHONE ENCOUNTER
Pt calls.  She thinks she has a uti.  The only symptom she has is a strong sense of odor.  That tells her that she has a uti.      Advised she would need some kind of visit.  She advised that Dr. Austin does not make her come in for appts and just orders the test.    Advised I will forward this to him and his team and someone will get back to her.

## 2022-07-25 NOTE — RESULT ENCOUNTER NOTE
Kierra, the urinalysis shows evidence of white blood cells.  I will await the results of the urine culture to see if antibiotics are indicated  Xavier Austin M.D.

## 2022-07-26 ENCOUNTER — TRANSFERRED RECORDS (OUTPATIENT)
Dept: OBGYN | Facility: CLINIC | Age: 77
End: 2022-07-26

## 2022-07-28 ENCOUNTER — TELEPHONE (OUTPATIENT)
Dept: OBGYN | Facility: CLINIC | Age: 77
End: 2022-07-28

## 2022-07-28 DIAGNOSIS — R82.81 PYURIA: Primary | ICD-10-CM

## 2022-07-28 LAB — BACTERIA UR CULT: ABNORMAL

## 2022-07-28 RX ORDER — CEPHALEXIN 500 MG/1
500 CAPSULE ORAL 2 TIMES DAILY
Qty: 20 CAPSULE | Refills: 0 | Status: SHIPPED | OUTPATIENT
Start: 2022-07-28 | End: 2022-08-07

## 2022-07-28 NOTE — RESULT ENCOUNTER NOTE
Urine cultures returned positive for pansensitive E. coli.  I will place the patient on Keflex 500 mg p.o. twice daily for 10 days and have sent a prescription to the [facility] pharmacy in [place].  She will have a follow-up test of cure urine culture 1-2 weeks after completing antibiotics.  Please notify the patient.  Thank you for your help with this

## 2022-08-17 DIAGNOSIS — N39.0 RECURRENT UTI: Primary | ICD-10-CM

## 2022-08-17 LAB
ALBUMIN UR-MCNC: NEGATIVE MG/DL
APPEARANCE UR: CLEAR
BILIRUB UR QL STRIP: NEGATIVE
COLOR UR AUTO: YELLOW
GLUCOSE UR STRIP-MCNC: NEGATIVE MG/DL
HGB UR QL STRIP: ABNORMAL
KETONES UR STRIP-MCNC: NEGATIVE MG/DL
LEUKOCYTE ESTERASE UR QL STRIP: NEGATIVE
NITRATE UR QL: NEGATIVE
PH UR STRIP: 6.5 [PH] (ref 5–7)
RBC #/AREA URNS AUTO: ABNORMAL /HPF
SP GR UR STRIP: 1.01 (ref 1–1.03)
SQUAMOUS #/AREA URNS AUTO: ABNORMAL /LPF
UROBILINOGEN UR STRIP-ACNC: 0.2 E.U./DL
WBC #/AREA URNS AUTO: ABNORMAL /HPF

## 2022-08-17 PROCEDURE — 81001 URINALYSIS AUTO W/SCOPE: CPT | Performed by: OBSTETRICS & GYNECOLOGY

## 2022-09-16 ENCOUNTER — LAB (OUTPATIENT)
Dept: LAB | Facility: CLINIC | Age: 77
End: 2022-09-16
Payer: COMMERCIAL

## 2022-09-16 DIAGNOSIS — E07.9 THYROID DYSFUNCTION: ICD-10-CM

## 2022-09-16 DIAGNOSIS — R82.81 PYURIA: ICD-10-CM

## 2022-09-16 DIAGNOSIS — N39.0 RECURRENT UTI: ICD-10-CM

## 2022-09-16 LAB
ALBUMIN UR-MCNC: NEGATIVE MG/DL
APPEARANCE UR: ABNORMAL
BACTERIA #/AREA URNS HPF: ABNORMAL /HPF
BILIRUB UR QL STRIP: NEGATIVE
COLOR UR AUTO: YELLOW
GLUCOSE UR STRIP-MCNC: NEGATIVE MG/DL
HGB UR QL STRIP: ABNORMAL
KETONES UR STRIP-MCNC: NEGATIVE MG/DL
LEUKOCYTE ESTERASE UR QL STRIP: ABNORMAL
NITRATE UR QL: POSITIVE
PH UR STRIP: 6 [PH] (ref 5–7)
RBC #/AREA URNS AUTO: ABNORMAL /HPF
SP GR UR STRIP: 1.01 (ref 1–1.03)
SQUAMOUS #/AREA URNS AUTO: ABNORMAL /LPF
UROBILINOGEN UR STRIP-ACNC: 0.2 E.U./DL
WBC #/AREA URNS AUTO: >100 /HPF

## 2022-09-16 PROCEDURE — 36415 COLL VENOUS BLD VENIPUNCTURE: CPT

## 2022-09-16 PROCEDURE — 81001 URINALYSIS AUTO W/SCOPE: CPT

## 2022-09-16 PROCEDURE — 84443 ASSAY THYROID STIM HORMONE: CPT

## 2022-09-17 LAB — TSH SERPL DL<=0.005 MIU/L-ACNC: 0.45 MU/L (ref 0.4–4)

## 2022-09-19 DIAGNOSIS — R82.81 PYURIA: Primary | ICD-10-CM

## 2022-09-19 PROCEDURE — 87186 SC STD MICRODIL/AGAR DIL: CPT

## 2022-09-19 PROCEDURE — 87086 URINE CULTURE/COLONY COUNT: CPT

## 2022-09-19 NOTE — RESULT ENCOUNTER NOTE
Please notify the patient of the normal thyroid result.  Please make sure that this patient had a urine culture performed.  She has chronic pyuria but oftentimes the culture is negative.  I really need to see the culture results before deciding whether antibiotic therapy is indicated.  Thank you for your help with this

## 2022-09-19 NOTE — RESULT ENCOUNTER NOTE
I dont see a result for a UC based on the UA from 9/16/22.  Can you check please to see that it was done.  If it was not done the pt needs a UC done for a past hx of recurrent UTI.  Often times her UA is pos and her UC is neg.  Will rx based on results of UC  thanks

## 2022-09-20 ENCOUNTER — OFFICE VISIT (OUTPATIENT)
Dept: OBGYN | Facility: CLINIC | Age: 77
End: 2022-09-20
Payer: COMMERCIAL

## 2022-09-20 VITALS — DIASTOLIC BLOOD PRESSURE: 64 MMHG | SYSTOLIC BLOOD PRESSURE: 110 MMHG | WEIGHT: 178.6 LBS | BODY MASS INDEX: 29.49 KG/M2

## 2022-09-20 DIAGNOSIS — N39.0 RECURRENT UTI: ICD-10-CM

## 2022-09-20 DIAGNOSIS — E07.9 THYROID DYSFUNCTION: ICD-10-CM

## 2022-09-20 DIAGNOSIS — N30.00 ACUTE CYSTITIS WITHOUT HEMATURIA: ICD-10-CM

## 2022-09-20 DIAGNOSIS — N81.11 CYSTOCELE, MIDLINE: Primary | ICD-10-CM

## 2022-09-20 PROCEDURE — 99214 OFFICE O/P EST MOD 30 MIN: CPT | Mod: 25 | Performed by: OBSTETRICS & GYNECOLOGY

## 2022-09-20 PROCEDURE — A4562 PESSARY, NON RUBBER,ANY TYPE: HCPCS | Performed by: OBSTETRICS & GYNECOLOGY

## 2022-09-20 RX ORDER — SULFAMETHOXAZOLE/TRIMETHOPRIM 800-160 MG
1 TABLET ORAL 2 TIMES DAILY
Qty: 14 TABLET | Refills: 0 | Status: SHIPPED | OUTPATIENT
Start: 2022-09-20 | End: 2023-05-31

## 2022-09-20 RX ORDER — LEVOTHYROXINE SODIUM 75 UG/1
TABLET ORAL
Qty: 90 TABLET | Refills: 3 | Status: SHIPPED | OUTPATIENT
Start: 2022-09-20 | End: 2023-04-18

## 2022-09-20 NOTE — PATIENT INSTRUCTIONS
You can reach your [facility] Care Team any time of the day by calling [phone]. This number will put you in touch with the 24 hour nurse line if the clinic is closed.    To contact your OB/GYN Station Coordinator/Surgery Scheduler please call [phone]. This is a direct number for your care team between 8 a.m. and 4 p.m. Monday through Friday.    [facility] is open for your convenience:  Monday through Friday 8 a.m. to 6 p.m.  Closed weekends and all major holidays.

## 2022-09-20 NOTE — NURSING NOTE
"Chief Complaint   Patient presents with     Pessary Check/Fit/Insert     Discuss pessary     UTI     Urine sample has been left     Medication Follow-up     Thyroid follow up       Initial /64   Wt 81 kg (178 lb 9.6 oz)   LMP 1997   BMI 29.49 kg/m   Estimated body mass index is 29.49 kg/m  as calculated from the following:    Height as of 22: 1.657 m (5' 5.25\").    Weight as of this encounter: 81 kg (178 lb 9.6 oz).  BP completed using cuff size: regular    Questioned patient about current smoking habits.  Pt. quit smoking some time ago.          The following HM Due: NONE    "

## 2022-09-20 NOTE — PROGRESS NOTES
Kierra Mcmullen is a 77 year old female  menopausal status post robotic supracervical hysterectomy BSO sacrocolpopexy on 2012 for pelvic floor relaxation who has a history of recurrent UTIs who on 2022 underwent a right total knee arthroplasty  with Dr. Castillo at [facility] would been using a #3 diaphragm type pessary for a recurrent cystocele enterocele presents today for a follow-up.  The patient also has been taking levothyroxine 88 mcg and alternating it with levothyroxine 75 mcg every other day for hypothyroidism.  She recently had her TSH checked on 2022 and it was 0.45 with normal being 0.4 to 4.0 milliunits/L.  We discussed this finding with the patient and her  and they are thinking at this point that they like to alter the dose of levothyroxine in an effort to put her TSH more in the mid range.  Patient otherwise states that she is feeling fine.  She recently had a UC done and she does have foul-smelling urine and some microscopic flecks of blood in her urine.  Urine culture was positive today for E. coli.  Previous E. coli's of been pansensitive.  The patient has been free of any UTIs since mid 2022.  She is not having any fevers chills dysuria back pain or other related symptoms.  Another concern is her cystocele and pelvic floor laxation.  The patient has been using a #5 diaphragm pessary but states that the anterior vaginal wall occasionally prolapses beyond the pessary.  She like to have this looked at to see if there is an alternative.  She does not want to do surgery    Past Medical History:   Diagnosis Date     Adenomatous polyp of colon 2011    needs conolonospy 2016     Arthritis 2005     Hypothyroidism     Abstracted 02     Osteopenia      Current Outpatient Medications   Medication     Biotin 5000 MCG CAPS     CALCIUM + D 600-200 MG-IU OR TABS     calcium polycarbophil (FIBERCON) 625 MG tablet     COMPOUNDED NON-CONTROLLED SUBSTANCE (CMPD RX) -  PHARMACY TO MIX COMPOUNDED MEDICATION     GLUCOSAMINE CHONDROITIN COMPLX PO     levothyroxine (SYNTHROID/LEVOTHROID) 75 MCG tablet     MULTI-DAY OR TABS     sulfamethoxazole-trimethoprim (BACTRIM DS) 800-160 MG tablet     No current facility-administered medications for this visit.     /64   Wt 81 kg (178 lb 9.6 oz)   LMP 07/22/1997   BMI 29.49 kg/m    Constitutional: healthy, alert and no distress  Head: Normocephalic. No masses, lesions, tenderness or abnormalities  Neck: Neck supple. No adenopathy. Thyroid symmetric, normal size,, Carotids without bruits.  Cardiovascular: negative, PMI normal. No lifts, heaves, or thrills. RRR. No murmurs, clicks gallops or rub  Respiratory: negative, Percussion normal. Good diaphragmatic excursion. Lungs clear  Gastrointestinal: Abdomen soft, non-tender. BS normal. No masses, organomegaly  Genitourinary: Normal external genitalia without lesions and the #5 diaphragm pessary was removed and cleaned with soap and water.  Speculum exam reveals a previously noted cystocele to be present.  The cervical stump was intact.  She saw no evidence of any ulceration or irritation.  The patient was refitted with a #6 diaphragm pessary and this offers better pelvic floor support.  The patient states that she also uses vaginal estrogen compounded cream 0.5 g on Monday and Thursday night because of her history of recurrent UTIs.  She has had no vaginal bleeding and feels good on this regimen    (N81.11) Cystocele, midline  (primary encounter diagnosis)  Comment: I did change the pessary to a #6 diaphragm pessary.  The patient puts it in the morning and takes it out at night is very comfortable with this.  She will let me know how this works  Plan: If it works well we will see her back in 2 to 3 months for follow-up    (N30.00) Acute cystitis without hematuria  Comment: I did review the results of her urine culture.  Plan: sulfamethoxazole-trimethoprim (BACTRIM DS)         800-160 MG  tablet, UA Macro with Reflex to         Micro and Culture - lab collect        I will prescribe Bactrim DS 1 p.o. twice daily for 7 days.  We will get a follow-up test of cure in 2 to 3 weeks with appropriate therapy to follow    (E07.9) Thyroid dysfunction  Comment: I reviewed her TSH value.  At this time again I did change her Synthroid dose to 75 mcg daily.  We will recheck a TSH in 4 to 6 weeks  Plan: levothyroxine (SYNTHROID/LEVOTHROID) 75 MCG         tablet, TSH with free T4 reflex        Detailed written plan was given    (N39.0) Recurrent UTI  Comment: As above  Plan: COMPOUNDED NON-CONTROLLED SUBSTANCE (CMPD RX) -        PHARMACY TO MIX COMPOUNDED MEDICATION        Refill given.  [place]  I did leave her prescription for urine analysis urine culture should she develop symptoms suggestive of UTI.  I have asked that they fax any results to us when they become available

## 2022-09-21 LAB — BACTERIA UR CULT: ABNORMAL

## 2022-09-21 NOTE — RESULT ENCOUNTER NOTE
See the office visit note of 9/20/2022 at which time I discussed this result and prescribed an antibiotic for her

## 2022-10-03 ENCOUNTER — LAB (OUTPATIENT)
Dept: LAB | Facility: CLINIC | Age: 77
End: 2022-10-03
Payer: COMMERCIAL

## 2022-10-03 DIAGNOSIS — N30.00 ACUTE CYSTITIS WITHOUT HEMATURIA: ICD-10-CM

## 2022-10-03 LAB
ALBUMIN UR-MCNC: NEGATIVE MG/DL
APPEARANCE UR: CLEAR
BACTERIA #/AREA URNS HPF: ABNORMAL /HPF
BILIRUB UR QL STRIP: NEGATIVE
COLOR UR AUTO: YELLOW
GLUCOSE UR STRIP-MCNC: NEGATIVE MG/DL
HGB UR QL STRIP: NEGATIVE
KETONES UR STRIP-MCNC: NEGATIVE MG/DL
LEUKOCYTE ESTERASE UR QL STRIP: ABNORMAL
NITRATE UR QL: NEGATIVE
PH UR STRIP: 7 [PH] (ref 5–7)
RBC #/AREA URNS AUTO: ABNORMAL /HPF
SP GR UR STRIP: 1.01 (ref 1–1.03)
SQUAMOUS #/AREA URNS AUTO: ABNORMAL /LPF
UROBILINOGEN UR STRIP-ACNC: 0.2 E.U./DL
WBC #/AREA URNS AUTO: ABNORMAL /HPF

## 2022-10-03 PROCEDURE — 81001 URINALYSIS AUTO W/SCOPE: CPT

## 2022-10-04 NOTE — RESULT ENCOUNTER NOTE
Kierra, the results of the urinalysis from 10/3/2022 are within normal limits.  Please let me know if you have any questions  Xavier Austin M.D.

## 2022-10-19 ENCOUNTER — DOCUMENTATION ONLY (OUTPATIENT)
Dept: LAB | Facility: CLINIC | Age: 77
End: 2022-10-19

## 2022-10-19 NOTE — PROGRESS NOTES
"Patient has lab only appt 10/31/22 for \"labs - thyroid and UA\", no urine orders in chart.  Please place FUTURE orders in Epic if appropriate.    Thanks,   [facility]    "

## 2022-10-20 ENCOUNTER — TELEPHONE (OUTPATIENT)
Dept: OBGYN | Facility: CLINIC | Age: 77
End: 2022-10-20

## 2022-10-20 DIAGNOSIS — N39.0 RECURRENT UTI: Primary | ICD-10-CM

## 2022-10-31 ENCOUNTER — LAB (OUTPATIENT)
Dept: LAB | Facility: CLINIC | Age: 77
End: 2022-10-31
Payer: COMMERCIAL

## 2022-10-31 DIAGNOSIS — N39.0 RECURRENT UTI: ICD-10-CM

## 2022-10-31 DIAGNOSIS — E07.9 THYROID DYSFUNCTION: ICD-10-CM

## 2022-10-31 LAB
ALBUMIN UR-MCNC: NEGATIVE MG/DL
APPEARANCE UR: CLEAR
BACTERIA #/AREA URNS HPF: ABNORMAL /HPF
BILIRUB UR QL STRIP: NEGATIVE
COLOR UR AUTO: YELLOW
GLUCOSE UR STRIP-MCNC: NEGATIVE MG/DL
HGB UR QL STRIP: NEGATIVE
KETONES UR STRIP-MCNC: NEGATIVE MG/DL
LEUKOCYTE ESTERASE UR QL STRIP: ABNORMAL
NITRATE UR QL: NEGATIVE
PH UR STRIP: 5.5 [PH] (ref 5–7)
RBC #/AREA URNS AUTO: ABNORMAL /HPF
SP GR UR STRIP: <=1.005 (ref 1–1.03)
SQUAMOUS #/AREA URNS AUTO: ABNORMAL /LPF
TRANS CELLS #/AREA URNS HPF: ABNORMAL /HPF
TSH SERPL DL<=0.005 MIU/L-ACNC: 1.47 MU/L (ref 0.4–4)
UROBILINOGEN UR STRIP-ACNC: 0.2 E.U./DL
WBC #/AREA URNS AUTO: ABNORMAL /HPF
WBC CLUMPS #/AREA URNS HPF: PRESENT /HPF

## 2022-10-31 PROCEDURE — 87086 URINE CULTURE/COLONY COUNT: CPT

## 2022-10-31 PROCEDURE — 36415 COLL VENOUS BLD VENIPUNCTURE: CPT

## 2022-10-31 PROCEDURE — 81001 URINALYSIS AUTO W/SCOPE: CPT

## 2022-10-31 PROCEDURE — 84443 ASSAY THYROID STIM HORMONE: CPT

## 2022-11-01 LAB — BACTERIA UR CULT: NO GROWTH

## 2022-11-01 NOTE — RESULT ENCOUNTER NOTE
Kierra, your thyroid results look great.  Lets see what the urine culture shows.  We should have a result back in 24-48 hrs  Thank you  Xavier Austin M.D.

## 2022-11-03 NOTE — RESULT ENCOUNTER NOTE
Kierra,  your thyroid function and urine culture results are within normal limits  Xavier Austin M.D.

## 2022-11-04 ENCOUNTER — OFFICE VISIT (OUTPATIENT)
Dept: OBGYN | Facility: CLINIC | Age: 77
End: 2022-11-04
Payer: COMMERCIAL

## 2022-11-04 VITALS — WEIGHT: 179 LBS | BODY MASS INDEX: 29.56 KG/M2 | SYSTOLIC BLOOD PRESSURE: 120 MMHG | DIASTOLIC BLOOD PRESSURE: 72 MMHG

## 2022-11-04 DIAGNOSIS — E07.9 THYROID DYSFUNCTION: ICD-10-CM

## 2022-11-04 DIAGNOSIS — N39.0 RECURRENT UTI: ICD-10-CM

## 2022-11-04 DIAGNOSIS — N81.11 CYSTOCELE, MIDLINE: Primary | ICD-10-CM

## 2022-11-04 PROCEDURE — 99213 OFFICE O/P EST LOW 20 MIN: CPT | Performed by: OBSTETRICS & GYNECOLOGY

## 2022-11-04 NOTE — NURSING NOTE
"Chief Complaint   Patient presents with     RECHECK     Thyroid       Initial /72   Wt 81.2 kg (179 lb)   LMP 1997   BMI 29.56 kg/m   Estimated body mass index is 29.56 kg/m  as calculated from the following:    Height as of 22: 1.657 m (5' 5.25\").    Weight as of this encounter: 81.2 kg (179 lb).  BP completed using cuff size: regular    Questioned patient about current smoking habits.  Pt. has never smoked.          The following HM Due: NONE      The following patient reported/Care Every where data was sent to:  P ABSTRACT QUALITY INITIATIVES [[id number]]        Yolande Lynn [facility]                 "

## 2022-11-04 NOTE — PATIENT INSTRUCTIONS
You can reach your [facility] Care Team any time of the day by calling [phone]. This number will put you in touch with the 24 hour nurse line if the clinic is closed.    To contact your OB/GYN Station Coordinator/Surgery Scheduler please call [phone]. This is a direct number for your care team between 8 a.m. and 4 p.m. Monday through Friday.    [facility] is open for your convenience:  Monday through Friday 8 a.m. to 6 p.m.  Closed weekends and all major holidays.

## 2022-11-07 NOTE — PROGRESS NOTES
Kierra Mcmullen is a 77 year old female  menopausal status post robotic supracervical hysterectomy BSO sacrocolpopexy on 2012 for pelvic floor relaxation who has a history of recurrent UTIs who presents today for a follow-up of her thyroid functions and also of her cystocele.  The patient is presently taking 75 mcg of levothyroxine daily and I reviewed her recent thyroid function level.  The patient feels good on this dosage of medication.  The patient had been using a #5 diaphragm pessary but than at her last visit was switched to a #6 as the cystocele was slipping beyond the pessary.  At present she states that she alternates between the #5 #6 pessary but is more interested in having a permanent surgical revision done.  I spoke to the patient about a referral to the pelvic floor specialists that she like to have this done.  I also reviewed her recent urine culture results being negative.  Patient plans to travel to [place] after [place] for the winter and will be back in spring    Past Medical History:   Diagnosis Date     Adenomatous polyp of colon 2011    needs conolonospy 2016     Arthritis      Hypothyroidism     Abstracted 02     Osteopenia      Current Outpatient Medications   Medication     Biotin 5000 MCG CAPS     CALCIUM + D 600-200 MG-IU OR TABS     calcium polycarbophil (FIBERCON) 625 MG tablet     COMPOUNDED NON-CONTROLLED SUBSTANCE (CMPD RX) - PHARMACY TO MIX COMPOUNDED MEDICATION     GLUCOSAMINE CHONDROITIN COMPLX PO     levothyroxine (SYNTHROID/LEVOTHROID) 75 MCG tablet     MULTI-DAY OR TABS     sulfamethoxazole-trimethoprim (BACTRIM DS) 800-160 MG tablet     No current facility-administered medications for this visit.     /72   Wt 81.2 kg (179 lb)   LMP 1997   BMI 29.56 kg/m    Constitutional: healthy, alert and no distress  Patient is very comfortable with insertion and removal of the pessary and declines further exam today    (N81.11) Cystocele, midline   (primary encounter diagnosis)  Comment: The patient will continue to use alternate between the #5 and #6 diaphragm pessary.  The Uro gyn referral was given  Plan: Adult Uro/Gyn  Referral        Done    (N39.0) Recurrent UTI  Comment: The patient has a prescription for periodic urinalysis and culture that she can obtain while in [place] if she becomes symptomatic with appropriate therapy based on the results  Plan: Written plan was given the patient understands    (E07.9) Thyroid dysfunction  Comment: Presently euthyroid  Plan: Continue with the present Synthroid dose of 75 mcg daily.  At this point would anticipate yearly recheck unless symptoms arise

## 2022-12-07 ENCOUNTER — OFFICE VISIT (OUTPATIENT)
Dept: UROLOGY | Facility: CLINIC | Age: 77
End: 2022-12-07
Attending: OBSTETRICS & GYNECOLOGY
Payer: COMMERCIAL

## 2022-12-07 VITALS
WEIGHT: 174 LBS | HEIGHT: 65 IN | BODY MASS INDEX: 28.99 KG/M2 | SYSTOLIC BLOOD PRESSURE: 119 MMHG | DIASTOLIC BLOOD PRESSURE: 79 MMHG | HEART RATE: 63 BPM

## 2022-12-07 DIAGNOSIS — N39.0 RECURRENT UTI: Primary | ICD-10-CM

## 2022-12-07 DIAGNOSIS — N81.10 PROLAPSE OF VAGINAL WALL: ICD-10-CM

## 2022-12-07 DIAGNOSIS — N81.11 CYSTOCELE, MIDLINE: ICD-10-CM

## 2022-12-07 PROCEDURE — 99215 OFFICE O/P EST HI 40 MIN: CPT | Performed by: OBSTETRICS & GYNECOLOGY

## 2022-12-07 PROCEDURE — G0463 HOSPITAL OUTPT CLINIC VISIT: HCPCS

## 2022-12-07 RX ORDER — NITROFURANTOIN 25; 75 MG/1; MG/1
100 CAPSULE ORAL DAILY
Qty: 90 CAPSULE | Refills: 1 | Status: SHIPPED | OUTPATIENT
Start: 2022-12-07 | End: 2023-04-18

## 2022-12-07 ASSESSMENT — PAIN SCALES - GENERAL: PAINLEVEL: NO PAIN (0)

## 2022-12-07 NOTE — LETTER
12/7/2022       RE: Kierra Mcmullen  [address] [place] [address]     Dear Colleague,    Thank you for referring your patient, Kierra Mcmullen, to the [facility] [place] at . Please see a copy of my visit note below.    December 7, 2022    Referring Provider: Xavier Austin MD  [address]  [place] [address]    Primary Care Provider: Xavier Austin    CC: recurrent prolapse    HPI:  Kierra Mcmullen is a 77 year old female who presents for evaluation of her pelvic floor symptoms.  She underwent a RA supracervical hysterectomy, BSO and sacrocolpopexy in 2012. She has developed recurrence of her prolapse. Her biggest concern is for recurrent UTIs. She has had 2 culture proven UTIs on 9/19 and 7/25, both with E. Coli. Her prolapse is really asymptomatic at this point and she is unsure how she wants to proceed.    Past Medical History:   Diagnosis Date     Adenomatous polyp of colon 5/2011    needs conolonospy 5/2016     Arthritis 2005     Hypothyroidism     Abstracted 7/16/02     Osteopenia        Past Surgical History:   Procedure Laterality Date     ARTHROPLASTY KNEE Left 5/19/2015    Procedure: ARTHROPLASTY KNEE;  Surgeon: Daniel Mack MD;  Location:  OR     CHOLECYSTECTOMY       COLONOSCOPY N/A 5/5/2016    Procedure: COLONOSCOPY;  Surgeon: Sage Berg MD;  Location:  GI     CYSTOSCOPY  1/31/2012    Procedure:CYSTOSCOPY; Surgeon:XAVIER AUSTIN; Location: OR     DAVINCI HYSTERECTOMY SUPRACERVICAL, SACROCOLPOPEXY, COMBINED  1/31/2012    Procedure:COMBINED DAVINCI HYSTERECTOMY SUPRACERVICAL, SACROCOLPOPEXY; DAVINCI LAPAROSCOPIC SUPRACERVICAL HYSTERECTOMY, BILATERAL SALPINGO-OOPHORECTOMY, SACROCOLPOPEXY  WITH COLOPLASTY MESH AND CYSTOPLASTY; Surgeon:XAVIER AUSTIN; Location: OR      REMOVAL OF TONSILS,<11 Y/O      Tonsils <12y.o.     HC TOOTH EXTRACTION W/FORCEP        ZZC NONSPECIFIC PROCEDURE      Cholecystectomy -- Abstracted 02     ZZHC COLONOSCOPY THRU STOMA, DIAGNOSTIC  2006       Social History     Socioeconomic History     Marital status:      Spouse name: Sage     Number of children: 1     Years of education: 18     Highest education level: Not on file   Occupational History     Occupation: Teacher     Employer: [employer] [address]   Tobacco Use     Smoking status: Former     Packs/day: 1.00     Years: 25.00     Pack years: 25.00     Types: Cigarettes     Start date: 1962     Quit date: 1987     Years since quittin.9     Smokeless tobacco: Never     Tobacco comments:        Vaping Use     Vaping Use: Never used   Substance and Sexual Activity     Alcohol use: Yes     Alcohol/week: 10.0 standard drinks     Comment: 1-2 glasses of wine/week     Drug use: No     Sexual activity: Yes     Partners: Male     Birth control/protection: None     Comment: vas   Other Topics Concern      Service Not Asked     Blood Transfusions Not Asked     Caffeine Concern No     Occupational Exposure No     Hobby Hazards No     Sleep Concern No     Stress Concern No     Weight Concern No     Special Diet No     Back Care No     Exercise Yes     Comment: walk     Bike Helmet Not Asked     Seat Belt Yes     Self-Exams Not Asked     Parent/sibling w/ CABG, MI or angioplasty before 65F 55M? No   Social History Narrative     Not on file     Social Determinants of Health     Financial Resource Strain: Not on file   Food Insecurity: Not on file   Transportation Needs: Not on file   Physical Activity: Not on file   Stress: Not on file   Social Connections: Not on file   Intimate Partner Violence: Not on file   Housing Stability: Not on file       Family History   Problem Relation Age of Onset     Alzheimer Disease Father      Cancer Mother          non-Hodgkins lymphoma     Osteoporosis Mother      Arthritis Mother         RA     Gastrointestinal  "Disease Mother         UC     Thyroid Disease Mother      Osteoporosis Sister      Gastrointestinal Disease Sister         diverticulitis     Cardiovascular Maternal Aunt         MI     Breast Cancer Paternal Aunt        ROS    No Known Allergies    Current Outpatient Medications   Medication     Biotin 5000 MCG CAPS     CALCIUM + D 600-200 MG-IU OR TABS     calcium polycarbophil (FIBERCON) 625 MG tablet     COMPOUNDED NON-CONTROLLED SUBSTANCE (CMPD RX) - PHARMACY TO MIX COMPOUNDED MEDICATION     GLUCOSAMINE CHONDROITIN COMPLX PO     levothyroxine (SYNTHROID/LEVOTHROID) 75 MCG tablet     MULTI-DAY OR TABS     nitroFURantoin macrocrystal-monohydrate (MACROBID) 100 MG capsule     sulfamethoxazole-trimethoprim (BACTRIM DS) 800-160 MG tablet     No current facility-administered medications for this visit.       /79   Pulse 63   Ht 1.651 m (5' 5\")   Wt 78.9 kg (174 lb)   LMP 07/22/1997   BMI 28.96 kg/m   Patient's last menstrual period was 07/22/1997. Body mass index is 28.96 kg/m .  Ms. Mcmullen is alert, comfortable in no acute distress, non-labored breathing.   Abdomen is soft, non-tender, non-distended, no CVAT.    Normal external female genitalia. The urethra was normal appearing without mass, NT.    She has loss of support for the cervix and vaginal apex on supine strain.  Speculum and bimanual exam are remarkable for normal vaginal epithelium.        POPQ  Aa 1   Ba 1  C 0  D -3  GH 5  PB 3  TVL 9  Ap 0  Bp 0    A/P: Kierra Mcmullen is a 77 year old F with recurrent vaginal apical prolapse s/p a RA supracervical hyst and sacrocolpopexy.    I had a long d/w the patient and her  about treatment options. They are travelling to [place] for the winter and will not be able to start any treatment until they return. As her biggest concern is the recurrent UTIs I will start her on macrobid 1 po every day until she cones back. If Kierra wants to pursue a surgical correction, I recommend that we get a CT of her " pelvis to try to localize the mesh and see if it has detached any where. Kierra will f/u with me in April.    In addition Kierra asked for a replacement of the #8 ring with support that she used to have but threw away. She is skilled with insertion and removal. She was given the pessary.    I spent a total of 45 minutes with  Ms. Mcmullen  on the date of the encounter in chart review, face to face patient visit, review of tests, documentation and/or discussion with other providers about the issues documented above.    Jonathan Patel MD  Professor, OB/GYN  Urogynecologist  CC  Patient Care Team:  Xavier Austin MD as PCP - General (OB/Gyn)  Matt Whitney MD as Assigned PCP  Xavier Austin MD as Assigned OBGYN Provider  Jenna Nguyen PA-C as Physician Assistant (Urology)  Xavier Austin MD as Referring Physician (OB/Gyn)  Mariah Fierro MD as Assigned Surgical Provider  Jonathan Patel MD as MD (OB/Gyn)  XAVIER AUSTIN

## 2022-12-07 NOTE — PROGRESS NOTES
December 7, 2022    Referring Provider: Xavier Austin MD  [address]  [address]  [place] [address]    Primary Care Provider: Xavier Austin    CC: recurrent prolapse    HPI:  Kierra Mcmullen is a 77 year old female who presents for evaluation of her pelvic floor symptoms.  She underwent a RA supracervical hysterectomy, BSO and sacrocolpopexy in 2012. She has developed recurrence of her prolapse. Her biggest concern is for recurrent UTIs. She has had 2 culture proven UTIs on 9/19 and 7/25, both with E. Coli. Her prolapse is really asymptomatic at this point and she is unsure how she wants to proceed.    Past Medical History:   Diagnosis Date     Adenomatous polyp of colon 5/2011    needs conolonospy 5/2016     Arthritis 2005     Hypothyroidism     Abstracted 7/16/02     Osteopenia        Past Surgical History:   Procedure Laterality Date     ARTHROPLASTY KNEE Left 5/19/2015    Procedure: ARTHROPLASTY KNEE;  Surgeon: Daniel Mack MD;  Location: [facility] OR     CHOLECYSTECTOMY       COLONOSCOPY N/A 5/5/2016    Procedure: COLONOSCOPY;  Surgeon: Sage Berg MD;  Location:  GI     CYSTOSCOPY  1/31/2012    Procedure:CYSTOSCOPY; Surgeon:XAVIER AUSTIN; Location:[facility] OR     DAVINCI HYSTERECTOMY SUPRACERVICAL, SACROCOLPOPEXY, COMBINED  1/31/2012    Procedure:COMBINED DAVINCI HYSTERECTOMY SUPRACERVICAL, SACROCOLPOPEXY; DAVINCI LAPAROSCOPIC SUPRACERVICAL HYSTERECTOMY, BILATERAL SALPINGO-OOPHORECTOMY, SACROCOLPOPEXY  WITH COLOPLASTY MESH AND CYSTOPLASTY; Surgeon:XAVIER AUSTIN; Location:[facility] OR     HC REMOVAL OF TONSILS,<13 Y/O      Tonsils <12y.o.     HC TOOTH EXTRACTION W/FORCEP       ZZC NONSPECIFIC PROCEDURE      Cholecystectomy -- Abstracted 7/16/02     ZZHC COLONOSCOPY THRU STOMA, DIAGNOSTIC  2006       Social History     Socioeconomic History     Marital status:      Spouse name: Sage     Number of children: 1     Years of education: 18     Highest education level: Not on file    Occupational History     Occupation: Teacher     Employer: [employer] [address]   Tobacco Use     Smoking status: Former     Packs/day: 1.00     Years: 25.00     Pack years: 25.00     Types: Cigarettes     Start date: 1962     Quit date: 1987     Years since quittin.9     Smokeless tobacco: Never     Tobacco comments:        Vaping Use     Vaping Use: Never used   Substance and Sexual Activity     Alcohol use: Yes     Alcohol/week: 10.0 standard drinks     Comment: 1-2 glasses of wine/week     Drug use: No     Sexual activity: Yes     Partners: Male     Birth control/protection: None     Comment: vas   Other Topics Concern      Service Not Asked     Blood Transfusions Not Asked     Caffeine Concern No     Occupational Exposure No     Hobby Hazards No     Sleep Concern No     Stress Concern No     Weight Concern No     Special Diet No     Back Care No     Exercise Yes     Comment: walk     Bike Helmet Not Asked     Seat Belt Yes     Self-Exams Not Asked     Parent/sibling w/ CABG, MI or angioplasty before 65F 55M? No   Social History Narrative     Not on file     Social Determinants of Health     Financial Resource Strain: Not on file   Food Insecurity: Not on file   Transportation Needs: Not on file   Physical Activity: Not on file   Stress: Not on file   Social Connections: Not on file   Intimate Partner Violence: Not on file   Housing Stability: Not on file       Family History   Problem Relation Age of Onset     Alzheimer Disease Father      Cancer Mother          non-Hodgkins lymphoma     Osteoporosis Mother      Arthritis Mother         RA     Gastrointestinal Disease Mother         UC     Thyroid Disease Mother      Osteoporosis Sister      Gastrointestinal Disease Sister         diverticulitis     Cardiovascular Maternal Aunt         MI     Breast Cancer Paternal Aunt        ROS    No Known Allergies    Current Outpatient Medications   Medication     Biotin 5000 MCG  "CAPS     CALCIUM + D 600-200 MG-IU OR TABS     calcium polycarbophil (FIBERCON) 625 MG tablet     COMPOUNDED NON-CONTROLLED SUBSTANCE (CMPD RX) - PHARMACY TO MIX COMPOUNDED MEDICATION     GLUCOSAMINE CHONDROITIN COMPLX PO     levothyroxine (SYNTHROID/LEVOTHROID) 75 MCG tablet     MULTI-DAY OR TABS     nitroFURantoin macrocrystal-monohydrate (MACROBID) 100 MG capsule     sulfamethoxazole-trimethoprim (BACTRIM DS) 800-160 MG tablet     No current facility-administered medications for this visit.       /79   Pulse 63   Ht 1.651 m (5' 5\")   Wt 78.9 kg (174 lb)   LMP 07/22/1997   BMI 28.96 kg/m   Patient's last menstrual period was 07/22/1997. Body mass index is 28.96 kg/m .  Ms. Mcmullen is alert, comfortable in no acute distress, non-labored breathing.   Abdomen is soft, non-tender, non-distended, no CVAT.    Normal external female genitalia. The urethra was normal appearing without mass, NT.    She has loss of support for the cervix and vaginal apex on supine strain.  Speculum and bimanual exam are remarkable for normal vaginal epithelium.        POPQ  Aa 1   Ba 1  C 0  D -3  GH 5  PB 3  TVL 9  Ap 0  Bp 0    A/P: Kierra Mcmullen is a 77 year old F with recurrent vaginal apical prolapse s/p a RA supracervical hyst and sacrocolpopexy.    I had a long d/w the patient and her  about treatment options. They are travelling to [place] for the winter and will not be able to start any treatment until they return. As her biggest concern is the recurrent UTIs I will start her on macrobid 1 po every day until she cones back. If Kierra wants to pursue a surgical correction, I recommend that we get a CT of her pelvis to try to localize the mesh and see if it has detached any where. Kierra will f/u with me in April.    In addition Kierra asked for a replacement of the #8 ring with support that she used to have but threw away. She is skilled with insertion and removal. She was given the pessary.    I spent a total of 45 " minutes with  Ms. Mcmullen  on the date of the encounter in chart review, face to face patient visit, review of tests, documentation and/or discussion with other providers about the issues documented above.    Jonathan Patel MD  Professor, OB/GYN  Urogynecologist  CC  Patient Care Team:  Xavier Austin MD as PCP - General (OB/Gyn)  Matt Whitney MD as Assigned PCP  Xavier Austin MD as Assigned OBGYN Provider  Jenna Nguyen PA-C as Physician Assistant (Urology)  Xavier Austin MD as Referring Physician (OB/Gyn)  Mariah Fierro MD as Assigned Surgical Provider  Jonathan Patel MD as MD (OB/Gyn)  XAVIER AUSTIN

## 2023-01-16 ENCOUNTER — MYC MEDICAL ADVICE (OUTPATIENT)
Dept: OBGYN | Facility: CLINIC | Age: 78
End: 2023-01-16

## 2023-01-16 NOTE — TELEPHONE ENCOUNTER
I spoke to the patient and her  today regarding the symptoms of loose stools and diarrhea stools that she has had for approximately a week.  The patient was placed on Macrobid 100 mg p.o. daily by Dr. Carreno for concerns of recurrent UTIs.  I discussed with the patient today the use of an antibiotic, issues of antimicrobial resistance and also potential GI side effects including but not limited to C. difficile colitis.  At this point I recommended stopping the Macrobid, going on a bland diet.  She can use Pepto-Bismol or up to 4 Imodium tablets daily.  If the patient is not better in the next 48 to 72 hours she will give me a call.  I would recommend going back to periodic urine analysis testing and if she is symptomatic additional testing at that time.  The patient has a history of chronic pyuria but her urine cultures have been negative and she has been asymptomatic.  I have treated the patient with antibiotics only with a documented UTI and then using an antibiotic that is proven sensitive to the causative agent.  I reviewed this with the patient and her  I think they have a good understanding  
Pt sends mychart with concerns of diarrhea x1 week    She is taking macrobid as rx'd by a different provider, please see message.    Kiki MONTIEL RN BSN        
[FreeTextEntry1] : I saw this patient in the office today.\par She presents with her .\par \par As you recall, on routine physical examination done by a visiting nurse, a cognitive screen demonstrated some memory difficulty. She was advised to follow with her primary doctor and then was referred here.\par Her  reports that he first noticed some cognitive difficulties in February of 2021.\par It is predominantly for short-term memory.

## 2023-02-28 ENCOUNTER — MYC MEDICAL ADVICE (OUTPATIENT)
Dept: INTERNAL MEDICINE | Facility: CLINIC | Age: 78
End: 2023-02-28
Payer: COMMERCIAL

## 2023-04-15 ASSESSMENT — ENCOUNTER SYMPTOMS
PALPITATIONS: 0
NAUSEA: 0
CHILLS: 0
HEARTBURN: 0
DIARRHEA: 0
CONSTIPATION: 0
SHORTNESS OF BREATH: 0
COUGH: 0
FEVER: 0
HEMATURIA: 0
BREAST MASS: 0
MYALGIAS: 0
SORE THROAT: 0
HEADACHES: 0
DIZZINESS: 0
HEMATOCHEZIA: 0
DYSURIA: 0
JOINT SWELLING: 0
WEAKNESS: 0
EYE PAIN: 0
ABDOMINAL PAIN: 0
NERVOUS/ANXIOUS: 0
PARESTHESIAS: 0
FREQUENCY: 1
ARTHRALGIAS: 0

## 2023-04-15 ASSESSMENT — ACTIVITIES OF DAILY LIVING (ADL): CURRENT_FUNCTION: NO ASSISTANCE NEEDED

## 2023-04-18 ENCOUNTER — OFFICE VISIT (OUTPATIENT)
Dept: INTERNAL MEDICINE | Facility: CLINIC | Age: 78
End: 2023-04-18
Payer: COMMERCIAL

## 2023-04-18 VITALS
HEIGHT: 66 IN | OXYGEN SATURATION: 96 % | HEART RATE: 68 BPM | DIASTOLIC BLOOD PRESSURE: 70 MMHG | RESPIRATION RATE: 18 BRPM | BODY MASS INDEX: 28.91 KG/M2 | TEMPERATURE: 97.6 F | WEIGHT: 179.9 LBS | SYSTOLIC BLOOD PRESSURE: 110 MMHG

## 2023-04-18 DIAGNOSIS — R30.0 DYSURIA: ICD-10-CM

## 2023-04-18 DIAGNOSIS — Z00.00 ENCOUNTER FOR MEDICARE ANNUAL WELLNESS EXAM: ICD-10-CM

## 2023-04-18 DIAGNOSIS — E07.9 THYROID DYSFUNCTION: ICD-10-CM

## 2023-04-18 DIAGNOSIS — N39.0 RECURRENT UTI: ICD-10-CM

## 2023-04-18 DIAGNOSIS — Z00.00 ENCOUNTER FOR PREVENTATIVE ADULT HEALTH CARE EXAMINATION: Primary | ICD-10-CM

## 2023-04-18 PROBLEM — M70.62 GREATER TROCHANTERIC BURSITIS OF LEFT HIP: Status: ACTIVE | Noted: 2022-09-02

## 2023-04-18 PROBLEM — Z96.652 HISTORY OF TOTAL KNEE ARTHROPLASTY, LEFT: Status: ACTIVE | Noted: 2020-11-19

## 2023-04-18 LAB
ALBUMIN SERPL BCG-MCNC: 4.2 G/DL (ref 3.5–5.2)
ALBUMIN UR-MCNC: NEGATIVE MG/DL
ALP SERPL-CCNC: 65 U/L (ref 35–104)
ALT SERPL W P-5'-P-CCNC: 14 U/L (ref 10–35)
ANION GAP SERPL CALCULATED.3IONS-SCNC: 11 MMOL/L (ref 7–15)
APPEARANCE UR: CLEAR
AST SERPL W P-5'-P-CCNC: 22 U/L (ref 10–35)
BACTERIA #/AREA URNS HPF: ABNORMAL /HPF
BILIRUB SERPL-MCNC: 0.4 MG/DL
BILIRUB UR QL STRIP: NEGATIVE
BUN SERPL-MCNC: 16.3 MG/DL (ref 8–23)
CALCIUM SERPL-MCNC: 9.9 MG/DL (ref 8.8–10.2)
CHLORIDE SERPL-SCNC: 103 MMOL/L (ref 98–107)
CHOLEST SERPL-MCNC: 201 MG/DL
COLOR UR AUTO: YELLOW
CREAT SERPL-MCNC: 1.01 MG/DL (ref 0.51–0.95)
DEPRECATED HCO3 PLAS-SCNC: 26 MMOL/L (ref 22–29)
ERYTHROCYTE [DISTWIDTH] IN BLOOD BY AUTOMATED COUNT: 12.9 % (ref 10–15)
GFR SERPL CREATININE-BSD FRML MDRD: 57 ML/MIN/1.73M2
GLUCOSE SERPL-MCNC: 98 MG/DL (ref 70–99)
GLUCOSE UR STRIP-MCNC: NEGATIVE MG/DL
HCT VFR BLD AUTO: 44.7 % (ref 35–47)
HDLC SERPL-MCNC: 76 MG/DL
HGB BLD-MCNC: 14.6 G/DL (ref 11.7–15.7)
HGB UR QL STRIP: ABNORMAL
KETONES UR STRIP-MCNC: NEGATIVE MG/DL
LDLC SERPL CALC-MCNC: 110 MG/DL
LEUKOCYTE ESTERASE UR QL STRIP: ABNORMAL
MCH RBC QN AUTO: 29.4 PG (ref 26.5–33)
MCHC RBC AUTO-ENTMCNC: 32.7 G/DL (ref 31.5–36.5)
MCV RBC AUTO: 90 FL (ref 78–100)
NITRATE UR QL: NEGATIVE
NONHDLC SERPL-MCNC: 125 MG/DL
PH UR STRIP: 6.5 [PH] (ref 5–7)
PLATELET # BLD AUTO: 276 10E3/UL (ref 150–450)
POTASSIUM SERPL-SCNC: 4.3 MMOL/L (ref 3.4–5.3)
PROT SERPL-MCNC: 7.5 G/DL (ref 6.4–8.3)
RBC # BLD AUTO: 4.96 10E6/UL (ref 3.8–5.2)
RBC #/AREA URNS AUTO: ABNORMAL /HPF
SODIUM SERPL-SCNC: 140 MMOL/L (ref 136–145)
SP GR UR STRIP: 1.01 (ref 1–1.03)
TRIGL SERPL-MCNC: 74 MG/DL
TSH SERPL DL<=0.005 MIU/L-ACNC: 1.38 UIU/ML (ref 0.3–4.2)
UROBILINOGEN UR STRIP-ACNC: 0.2 E.U./DL
WBC # BLD AUTO: 5 10E3/UL (ref 4–11)
WBC #/AREA URNS AUTO: ABNORMAL /HPF

## 2023-04-18 PROCEDURE — 84443 ASSAY THYROID STIM HORMONE: CPT | Performed by: INTERNAL MEDICINE

## 2023-04-18 PROCEDURE — 36415 COLL VENOUS BLD VENIPUNCTURE: CPT | Performed by: INTERNAL MEDICINE

## 2023-04-18 PROCEDURE — 85027 COMPLETE CBC AUTOMATED: CPT | Performed by: INTERNAL MEDICINE

## 2023-04-18 PROCEDURE — 87086 URINE CULTURE/COLONY COUNT: CPT | Performed by: INTERNAL MEDICINE

## 2023-04-18 PROCEDURE — 80061 LIPID PANEL: CPT | Performed by: INTERNAL MEDICINE

## 2023-04-18 PROCEDURE — G0439 PPPS, SUBSEQ VISIT: HCPCS | Performed by: INTERNAL MEDICINE

## 2023-04-18 PROCEDURE — 87186 SC STD MICRODIL/AGAR DIL: CPT | Performed by: INTERNAL MEDICINE

## 2023-04-18 PROCEDURE — 80053 COMPREHEN METABOLIC PANEL: CPT | Performed by: INTERNAL MEDICINE

## 2023-04-18 PROCEDURE — 81001 URINALYSIS AUTO W/SCOPE: CPT | Performed by: INTERNAL MEDICINE

## 2023-04-18 PROCEDURE — 87088 URINE BACTERIA CULTURE: CPT | Performed by: INTERNAL MEDICINE

## 2023-04-18 PROCEDURE — 99213 OFFICE O/P EST LOW 20 MIN: CPT | Mod: 25 | Performed by: INTERNAL MEDICINE

## 2023-04-18 RX ORDER — LEVOTHYROXINE SODIUM 75 UG/1
TABLET ORAL
Qty: 90 TABLET | Refills: 3 | Status: SHIPPED | OUTPATIENT
Start: 2023-04-18 | End: 2023-06-23

## 2023-04-18 ASSESSMENT — ENCOUNTER SYMPTOMS
DIARRHEA: 0
CHILLS: 0
NERVOUS/ANXIOUS: 0
WEAKNESS: 0
EYE PAIN: 0
SHORTNESS OF BREATH: 0
CONSTIPATION: 0
DIZZINESS: 0
HEARTBURN: 0
ARTHRALGIAS: 0
HEMATURIA: 0
PARESTHESIAS: 0
FREQUENCY: 1
ABDOMINAL PAIN: 0
FEVER: 0
BREAST MASS: 0
JOINT SWELLING: 0
SORE THROAT: 0
COUGH: 0
DYSURIA: 0
MYALGIAS: 0
HEADACHES: 0
HEMATOCHEZIA: 0
NAUSEA: 0
PALPITATIONS: 0

## 2023-04-18 ASSESSMENT — ACTIVITIES OF DAILY LIVING (ADL): CURRENT_FUNCTION: NO ASSISTANCE NEEDED

## 2023-04-18 ASSESSMENT — PAIN SCALES - GENERAL: PAINLEVEL: NO PAIN (0)

## 2023-04-18 NOTE — PATIENT INSTRUCTIONS
Patient Education   Personalized Prevention Plan  You are due for the preventive services outlined below.  Your care team is available to assist you in scheduling these services.  If you have already completed any of these items, please share that information with your care team to update in your medical record.  There are no preventive care reminders to display for this patient.    Urinary Incontinence, Female (Adult)   Urinary incontinence means loss of bladder control. This problem affects many women, especially as they get older. If you have incontinence, you may be embarrassed to ask for help. But know that this problem can be treated.   Types of Incontinence  There are different types of incontinence. Two of the main types are described here. You can have more than one type.     Stress incontinence. With this type, urine leaks when pressure (stress) is put on the bladder. This may happen when you cough, sneeze, or laugh. Stress incontinence most often occurs because the pelvic floor muscles that support the bladder and urethra are weak. This can happen after pregnancy and vaginal childbirth or a hysterectomy. It can also be due to excess body weight or hormone changes.    Urge incontinence (also called overactive bladder). With this type, a sudden urge to urinate is felt often. This may happen even though there may not be much urine in the bladder. The need to urinate often during the night is common. Urge incontinence most often occurs because of bladder spasms. This may be due to bladder irritation or infection. Damage to bladder nerves or pelvic muscles, constipation, and certain medicines can also lead to urge incontinence.  Treatment depends on the cause. Further evaluation is needed to find the type you have. This will likely include an exam and certain tests. Based on the results, you and your healthcare provider can then plan treatment. Until a diagnosis is made, the home care tips below can help  ease symptoms.   Home care    Do pelvic floor muscle exercises, if they are prescribed. The pelvic floor muscles help support the bladder and urethra. Many women find that their symptoms improve when doing special exercises that strengthen these muscles. To do the exercises, contract the muscles you would use to stop your stream of urine. But do this when you re not urinating. Hold for 10 seconds, then relax. Repeat 10 to 20 times in a row, at least 3 times a day. Your healthcare provider may give you other instructions for how to do the exercises and how often.    Keep a bladder diary. This helps track how often and how much you urinate over a set period of time. Bring this diary with you to your next visit with the provider. The information can help your provider learn more about your bladder problem.    Lose weight, if advised to by your provider. Extra weight puts pressure on the bladder. Your provider can help you create a weight-loss plan that s right for you. This may include exercising more and making certain diet changes.    Don't have foods and drinks that may irritate the bladder. These can include alcohol and caffeinated drinks.    Quit smoking. Smoking and other tobacco use can lead to a long-term (chronic) cough that strains the pelvic floor muscles. Smoking may also damage the bladder and urethra. Talk with your provider about treatments or methods you can use to quit smoking.    If drinking large amounts of fluid makes you have symptoms, you may be advised to limit your fluid intake. You may also be advised to drink most of your fluids during the day and to limit fluids at night.    If you re worried about urine leakage or accidents, you may wear absorbent pads to catch urine. Change the pads often. This helps reduce discomfort. It may also reduce the risk of skin or bladder infections.    Follow-up care  Follow up with your healthcare provider, or as directed. It may take some to find the right  treatment for your problem. But healthy lifestyle changes can be made right away. These include such things as exercising on a regular basis, eating a healthy diet, losing weight (if needed), and quitting smoking. Your treatment plan may include special therapies or medicines. Certain procedures or surgery may also be options. Talk about any questions you have with your provider.   When to seek medical advice  Call the healthcare provider right away if any of these occur:    Fever of 100.4 F (38 C) or higher, or as directed by your provider    Bladder pain or fullness    Belly swelling    Nausea or vomiting    Back pain    Weakness, dizziness, or fainting  [employer]lma last reviewed this educational content on 1/1/2020 2000-2022 [employer]. All rights reserved. This information is not intended as a substitute for professional medical care. Always follow your healthcare professional's instructions.

## 2023-04-18 NOTE — PROGRESS NOTES
"SUBJECTIVE:   Kierra is a 78 year old who presents for Preventive Visit.      4/18/2023     8:20 AM   Additional Questions   Roomed by Jossie Sabillon     Patient has been advised of split billing requirements and indicates understanding: Yes  Are you in the first 12 months of your Medicare coverage?  No    Healthy Habits:     In general, how would you rate your overall health?  Excellent    Frequency of exercise:  6-7 days/week    Duration of exercise:  30-45 minutes    Do you usually eat at least 4 servings of fruit and vegetables a day, include whole grains    & fiber and avoid regularly eating high fat or \"junk\" foods?  Yes    Taking medications regularly:  Yes    Medication side effects:  None    Ability to successfully perform activities of daily living:  No assistance needed    Home Safety:  No safety concerns identified    Hearing Impairment:  No hearing concerns    In the past 6 months, have you been bothered by leaking of urine? Yes    In general, how would you rate your overall mental or emotional health?  Excellent      PHQ-2 Total Score: 0      Have you ever done Advance Care Planning? (For example, a Health Directive, POLST, or a discussion with a medical provider or your loved ones about your wishes): Yes, advance care planning is on file.       Fall risk  Fallen 2 or more times in the past year?: No  Any fall with injury in the past year?: No    Cognitive Screening   1) Repeat 3 items (Leader, Season, Table)    2) Clock draw: NORMAL  3) 3 item recall: Recalls 3 objects  Results: 3 items recalled: COGNITIVE IMPAIRMENT LESS LIKELY    Mini-CogTM Copyright ANOOP Olivares. Licensed by the author for use in [facility]; reprinted with permission (mariana@.[facility]). All rights reserved.      Do you have sleep apnea, excessive snoring or daytime drowsiness?: no    Reviewed and updated as needed this visit by clinical staff   Tobacco  Allergies  Meds              Reviewed and updated as needed this visit by " Provider                 Social History     Tobacco Use     Smoking status: Former     Packs/day: 1.00     Years: 25.00     Pack years: 25.00     Types: Cigarettes     Start date: 1962     Quit date: 1987     Years since quittin.3     Passive exposure: Past     Smokeless tobacco: Never     Tobacco comments:        Vaping Use     Vaping status: Never Used     Passive vaping exposure: Yes   Substance Use Topics     Alcohol use: Yes     Alcohol/week: 10.0 standard drinks of alcohol     Comment: 1-2 glasses of wine/week             4/15/2023     4:52 PM   Alcohol Use   Prescreen: >3 drinks/day or >7 drinks/week? No     Do you have a current opioid prescription? No  Do you use any other controlled substances or medications that are not prescribed by a provider? None          PROBLEMS TO ADD ON...  Has history of hypothyroidism. On replacement treatment with Synthroid. No heat /cold intolerance, heart palpitations, weight loss/ gain ,  change in bowel habits.  Has had recurrent UTIs, finished longer course of bactrim. No symptoms currently.     Current providers sharing in care for this patient include:   Patient Care Team:  Xavier Austin MD as PCP - General (OB/Gyn)  Matt Whitney MD as Assigned PCP  Xavier Austin MD as Assigned OBGYN Provider  Jenna Nguyen PA-C as Physician Assistant (Urology)  Xavier Austin MD as Referring Physician (OB/Gyn)  Mariah Fierro MD as Assigned Surgical Provider  Jonathan Patel MD as MD (OB/Gyn)    The following health maintenance items are reviewed in Epic and correct as of today:  Health Maintenance   Topic Date Due     ANNUAL REVIEW OF HM ORDERS  2023     MEDICARE ANNUAL WELLNESS VISIT  2023     MAMMO SCREENING  06/15/2023     TSH W/FREE T4 REFLEX  10/31/2023     FALL RISK ASSESSMENT  2024     ADVANCE CARE PLANNING  2026     COLORECTAL CANCER SCREENING  2026     LIPID  2027     DTAP/TDAP/TD IMMUNIZATION  "(3 - Td or Tdap) 02/28/2033     DEXA  06/14/2036     PHQ-2 (once per calendar year)  Completed     INFLUENZA VACCINE  Completed     Pneumococcal Vaccine: 65+ Years  Completed     ZOSTER IMMUNIZATION  Completed     COVID-19 Vaccine  Completed     HEPATITIS C SCREENING  Addressed     IPV IMMUNIZATION  Aged Out     MENINGITIS IMMUNIZATION  Aged Out     Lab work is in process  Labs reviewed in EPIC        Pertinent mammograms are reviewed under the imaging tab.    Review of Systems   Constitutional: Negative for chills and fever.   HENT: Negative for congestion, ear pain, hearing loss and sore throat.    Eyes: Negative for pain and visual disturbance.   Respiratory: Negative for cough and shortness of breath.    Cardiovascular: Negative for chest pain, palpitations and peripheral edema.   Gastrointestinal: Negative for abdominal pain, constipation, diarrhea, heartburn, hematochezia and nausea.   Breasts:  Negative for tenderness, breast mass and discharge.   Genitourinary: Positive for frequency and urgency. Negative for dysuria, genital sores, hematuria, pelvic pain, vaginal bleeding and vaginal discharge.   Musculoskeletal: Negative for arthralgias, joint swelling and myalgias.   Skin: Negative for rash.   Neurological: Negative for dizziness, weakness, headaches and paresthesias.   Psychiatric/Behavioral: Negative for mood changes. The patient is not nervous/anxious.          OBJECTIVE:   LMP  (LMP Unknown)   Breastfeeding No  Estimated body mass index is 28.96 kg/m  as calculated from the following:    Height as of 12/7/22: 1.651 m (5' 5\").    Weight as of 12/7/22: 78.9 kg (174 lb).  Physical Exam  GENERAL: healthy, alert and no distress  EYES: Eyes grossly normal to inspection, PERRL and conjunctivae and sclerae normal  HENT: ear canals and TM's normal, nose and mouth without ulcers or lesions  NECK: no adenopathy, no asymmetry, masses, or scars and thyroid normal to palpation  RESP: lungs clear to auscultation - " "no rales, rhonchi or wheezes  CV: regular rate and rhythm, normal S1 S2, no S3 or S4, no murmur, click or rub, no peripheral edema and peripheral pulses strong  ABDOMEN: soft, nontender, no hepatosplenomegaly, no masses and bowel sounds normal  MS: no gross musculoskeletal defects noted, no edema  SKIN: no suspicious lesions or rashes  NEURO: Normal strength and tone, mentation intact and speech normal  PSYCH: mentation appears normal, affect normal/bright    Diagnostic Test Results:  Labs reviewed in Epic    ASSESSMENT / PLAN:       ICD-10-CM    1. Encounter for preventative adult health care examination  Z00.00 Lipid panel reflex to direct LDL Fasting     TSH with free T4 reflex     CBC with platelets     Comprehensive metabolic panel (BMP + Alb, Alk Phos, ALT, AST, Total. Bili, TP)     UA with Microscopic reflex to Culture - lab collect      2. Thyroid dysfunction  E07.9 levothyroxine (SYNTHROID/LEVOTHROID) 75 MCG tablet     TSH with free T4 reflex     OFFICE/OUTPT VISIT,EST,LEVL III      3. Recurrent UTI  N39.0 UA with Microscopic reflex to Culture - lab collect     OFFICE/OUTPT VISIT,EST,LEVL III        Assess thyroid function  Continue treatment   Assess UA  Preventive labs     Patient has been advised of split billing requirements and indicates understanding: Yes      COUNSELING:  Reviewed preventive health counseling, as reflected in patient instructions       Regular exercise       Healthy diet/nutrition       Vision screening       Hearing screening       Colon cancer screening      BMI:   Estimated body mass index is 28.96 kg/m  as calculated from the following:    Height as of 12/7/22: 1.651 m (5' 5\").    Weight as of 12/7/22: 78.9 kg (174 lb).         She reports that she quit smoking about 36 years ago. Her smoking use included cigarettes. She started smoking about 61 years ago. She has a 25.00 pack-year smoking history. She has been exposed to tobacco smoke. She has never used smokeless " tobacco.      Appropriate preventive services were discussed with this patient, including applicable screening as appropriate for cardiovascular disease, diabetes, osteopenia/osteoporosis, and glaucoma.  As appropriate for age/gender, discussed screening for colorectal cancer, prostate cancer, breast cancer, and cervical cancer. Checklist reviewing preventive services available has been given to the patient.    Reviewed patients plan of care and provided an AVS. The Intermediate Care Plan ( asthma action plan, low back pain action plan, and migraine action plan) for Kierra meets the Care Plan requirement. This Care Plan has been established and reviewed with the Patient.          Matt Whitney MD  [facility]    Identified Health Risks:    I have reviewed Opioid Use Disorder and Substance Use Disorder risk factors and made any needed referrals.       Information on urinary incontinence and treatment options given to patient.

## 2023-04-20 ENCOUNTER — TELEPHONE (OUTPATIENT)
Dept: OBGYN | Facility: CLINIC | Age: 78
End: 2023-04-20
Payer: COMMERCIAL

## 2023-04-20 DIAGNOSIS — R30.0 DYSURIA: Primary | ICD-10-CM

## 2023-04-20 LAB — BACTERIA UR CULT: ABNORMAL

## 2023-04-20 NOTE — TELEPHONE ENCOUNTER
I spoke to the patient and her  today regarding the results of her recent urine culture which was positive for greater than 100,000 colonies of Klebsiella pneumonia resistant to ampicillin sensitive to Cipro with intermediate sensitivities to nitrofurantoin.  The patient has a supply of Macrobid and Cipro which she was given by Dr. Fierro earlier.  The patient stopped taking antibiotic suppression several months ago as noted in her [employer]t message.  At present she notices some discomfort with urination and a malodor to her urine to symptoms which she equates with a UTI.  At this point I recommended that she take the Rx for Cipro 500 mg po bid that she has at home.  She has 13 tabs.  We discussed potential side effects including Achilles tendon rupture.  The pt states that she has taken this med before without concerns and will take it now.  We will get a f/u UMAC culture if pos in 10-14 days after completing her abx  orders placed

## 2023-05-05 LAB
ALBUMIN UR-MCNC: NEGATIVE MG/DL
APPEARANCE UR: CLEAR
BACTERIA #/AREA URNS HPF: ABNORMAL /HPF
BILIRUB UR QL STRIP: NEGATIVE
COLOR UR AUTO: YELLOW
GLUCOSE UR STRIP-MCNC: NEGATIVE MG/DL
HGB UR QL STRIP: ABNORMAL
KETONES UR STRIP-MCNC: NEGATIVE MG/DL
LEUKOCYTE ESTERASE UR QL STRIP: ABNORMAL
NITRATE UR QL: NEGATIVE
PH UR STRIP: 6.5 [PH] (ref 5–7)
RBC #/AREA URNS AUTO: ABNORMAL /HPF
SP GR UR STRIP: 1.01 (ref 1–1.03)
SQUAMOUS #/AREA URNS AUTO: ABNORMAL /LPF
UROBILINOGEN UR STRIP-ACNC: 0.2 E.U./DL
WBC #/AREA URNS AUTO: >100 /HPF

## 2023-05-05 PROCEDURE — 81001 URINALYSIS AUTO W/SCOPE: CPT | Performed by: INTERNAL MEDICINE

## 2023-05-05 PROCEDURE — 87088 URINE BACTERIA CULTURE: CPT | Performed by: INTERNAL MEDICINE

## 2023-05-05 PROCEDURE — 87086 URINE CULTURE/COLONY COUNT: CPT | Performed by: INTERNAL MEDICINE

## 2023-05-05 PROCEDURE — 87186 SC STD MICRODIL/AGAR DIL: CPT | Performed by: INTERNAL MEDICINE

## 2023-05-07 LAB — BACTERIA UR CULT: ABNORMAL

## 2023-05-09 ENCOUNTER — TELEPHONE (OUTPATIENT)
Dept: OBGYN | Facility: CLINIC | Age: 78
End: 2023-05-09
Payer: COMMERCIAL

## 2023-05-09 DIAGNOSIS — R30.0 DYSURIA: Primary | ICD-10-CM

## 2023-05-09 RX ORDER — CIPROFLOXACIN 500 MG/1
500 TABLET, FILM COATED ORAL 2 TIMES DAILY
Qty: 14 TABLET | Refills: 0 | Status: SHIPPED | OUTPATIENT
Start: 2023-05-09 | End: 2023-05-16

## 2023-05-09 NOTE — TELEPHONE ENCOUNTER
Patient returning call.  Verbalized receipt of Dr. Austin's voicemail and instructions.  Patient wanting reminder of side effects r/t tendons - referred to previous notes and reviewed discussions had with Dr. Austin at previous appointments.  Patient reports she has taken cipro without complication, was just concerned because she is currently getting physical therapy for her knees.    Patient denies further needs/concerns at this time, and knows to follow up in 10-14 days for repeat urine specimen.      Denise ALANIZ RN

## 2023-05-09 NOTE — TELEPHONE ENCOUNTER
Left a voicemail message for the patient to return my call.  Urine culture returned positive for Klebsiella pneumoniae the same species that was present on her previous urine culture from 4/18/2023.  I will send a prescription for Cipro 500 mg p.o. twice daily for 7 days to her listed pharmacy and asked her to repeat a urinalysis and culture 2 weeks after completing the medication

## 2023-05-25 ENCOUNTER — LAB (OUTPATIENT)
Dept: LAB | Facility: CLINIC | Age: 78
End: 2023-05-25
Payer: COMMERCIAL

## 2023-05-25 DIAGNOSIS — R30.0 DYSURIA: ICD-10-CM

## 2023-05-25 PROCEDURE — 81001 URINALYSIS AUTO W/SCOPE: CPT

## 2023-05-26 ENCOUNTER — TELEPHONE (OUTPATIENT)
Dept: OBGYN | Facility: CLINIC | Age: 78
End: 2023-05-26
Payer: COMMERCIAL

## 2023-05-26 NOTE — RESULT ENCOUNTER NOTE
I left the patient a voicemail message with respect to the normal results of the urine analysis microscopic exam.  I have asked her to call should she have any questions or concerns  Xavier Austin MD FACOG

## 2023-05-26 NOTE — TELEPHONE ENCOUNTER
Patient calling with some questions.  Would like a call back to discuss possible surgery with Dr Patel.  She did have an appt with you on Tues but it was cancelled.    Please call her.    Alberta Johnson RN

## 2023-05-31 ENCOUNTER — OFFICE VISIT (OUTPATIENT)
Dept: UROLOGY | Facility: CLINIC | Age: 78
End: 2023-05-31
Payer: COMMERCIAL

## 2023-05-31 VITALS
BODY MASS INDEX: 28.77 KG/M2 | HEIGHT: 66 IN | DIASTOLIC BLOOD PRESSURE: 68 MMHG | SYSTOLIC BLOOD PRESSURE: 116 MMHG | WEIGHT: 179 LBS

## 2023-05-31 DIAGNOSIS — N81.11 CYSTOCELE, MIDLINE: Primary | ICD-10-CM

## 2023-05-31 DIAGNOSIS — Z98.890 HISTORY OF SACROCOLPOPEXY: ICD-10-CM

## 2023-05-31 PROCEDURE — 99215 OFFICE O/P EST HI 40 MIN: CPT | Performed by: OBSTETRICS & GYNECOLOGY

## 2023-05-31 NOTE — PROGRESS NOTES
May 31, 2023    Return visit    Patient returns today for f/u for recurrent UTIs and recurrent prolapse. I last saw Kierra in Dec 2022. I placed her on macrobid prophylaxis for recurrent UTIs. She na dher  traveled to [place] for the winter. She was only able to take the macrcobid for 1 months and then stopped it due to diarrhea. She developed a Klebsiella UTI in late APR that required 2 courses of cipro. Kierra is now asymptomatic.    Kierra had a CT urogram in 12/03/2021 for microscopic hematuria that was essentially WNL  INDICATION: Hematuria, unknown cause.  COMPARISON: 6/12/2020.  TECHNIQUE: CT scan of the abdomen and pelvis using urogram technique with pre contrast, post contrast, and delayed images. Multiplanar reformats were obtained. Dose reduction techniques were used.   CONTRAST: 80 mL Isovue-370.     FINDINGS:   LOWER CHEST: Normal.     HEPATOBILIARY: Liver is negative. Gallbladder is absent. No biliary dilatation.     PANCREAS: Normal.     SPLEEN: Normal.     ADRENAL GLANDS: Normal.     RIGHT KIDNEY/URETER: Normal right kidney. No intrarenal calculi or focal renal lesions. No ureteral stones, hydronephrosis, or ureteral filling defects.     LEFT KIDNEY/URETER: Normal left kidney. No intrarenal calculi or focal renal lesions. No ureteral stones, hydronephrosis, or ureteral filling defects.     BLADDER: Normal. No bladder wall thickening.     BOWEL: Normal.     LYMPH NODES: Normal.     VASCULATURE: Aortic calcification without aneurysm.     PELVIC ORGANS: Hysterectomy.     MUSCULOSKELETAL: Moderate degenerative changes in the lumbar spine.                                                                      IMPRESSION:  1.  Normal kidneys, ureters, and bladder. No CT findings to explain hematuria.     2.  No acute findings in the abdomen or pelvis.     3.  PO cholecystectomy and hysterectomy    She also had a cystoscopy by Dr. Fierro 12/08/21  Cystoscopy Note: After informed consent was obtained patient was  "prepped and draped in the standard fashion.  The flexible cystoscope was inserted into a normal appearing urethral meatus.  The urothelium was carefully examined and there were some diffuse cystitis lesion but there were no tumors, masses, stones, foreign bodies, or other urothelial abnormalities noted.  Bilateral ureteral orifices were noted in the normal orthotopic position and both effluxed clear urine.  The cystoscope was retroflexed and the bladder neck was unremarkable.  The urethra was carefully examined upon removing the cystoscope and was unremarkable.  Patient tolerated the procedure without complications noted.    She and her  are primarily interested in discussing her prolapse and possible treatment options for that.     /68   Ht 1.664 m (5' 5.5\")   Wt 81.2 kg (179 lb)   LMP  (LMP Unknown)   BMI 29.33 kg/m    She is comfortable, in no distress, non-labored breathing.  Abdomen is soft, non-tender, non-distended.  Normal external female genitalia.  Speculum and bimanual exam are remarkable for large cystocele.    POPQ  Aa 3   Ba 3  C 0  D -  GH 6  PB 3  TVL 9  Ap -2  Bp -2    A/P: 78 year old F with:  1) Recurrent UTIs  2) Recurrent prolapse    Will get MRI of pelvic to try to visualize her sacrocolpopexy mesh and where it might have failed. Kierra will f/u with me after the MRI to discuss treatment options    I spent a total of 45 minutes with  Ms. Mcmullen  on the date of the encounter in chart review, face to face patient visit, review of tests, documentation and/or discussion with other providers about the issues documented above.    Jonathan Patel MD  Professor, OB/GYN  Urogynecologist    CC  Patient Care Team:  Xavier Austin MD as PCP - General (OB/Gyn)  Matt Whitney MD as Assigned PCP  Xavier Austin MD as Assigned OBGYN Provider  Jenna Nguyen PA-C as Physician Assistant (Urology)  Xavier Austin MD as Referring Physician (OB/Gyn)  Mariah Fierro MD as " Assigned Surgical Provider  Jonathan Patel MD as MD (OB/Gyn)

## 2023-06-09 ENCOUNTER — TELEPHONE (OUTPATIENT)
Dept: INTERNAL MEDICINE | Facility: CLINIC | Age: 78
End: 2023-06-09
Payer: COMMERCIAL

## 2023-06-09 DIAGNOSIS — Z78.0 MENOPAUSE: Primary | ICD-10-CM

## 2023-06-10 ENCOUNTER — HOSPITAL ENCOUNTER (OUTPATIENT)
Dept: MRI IMAGING | Facility: CLINIC | Age: 78
Discharge: HOME | End: 2023-06-10
Attending: OBSTETRICS & GYNECOLOGY | Admitting: OBSTETRICS & GYNECOLOGY
Payer: COMMERCIAL

## 2023-06-10 DIAGNOSIS — Z98.890 HISTORY OF SACROCOLPOPEXY: ICD-10-CM

## 2023-06-10 DIAGNOSIS — N81.11 CYSTOCELE, MIDLINE: ICD-10-CM

## 2023-06-10 PROCEDURE — A9585 GADOBUTROL INJECTION: HCPCS | Performed by: OBSTETRICS & GYNECOLOGY

## 2023-06-10 PROCEDURE — 72197 MRI PELVIS W/O & W/DYE: CPT

## 2023-06-10 PROCEDURE — 255N000002 HC RX 255 OP 636: Performed by: OBSTETRICS & GYNECOLOGY

## 2023-06-10 RX ORDER — GADOBUTROL 604.72 MG/ML
8 INJECTION INTRAVENOUS ONCE
Status: COMPLETED | OUTPATIENT
Start: 2023-06-10 | End: 2023-06-10

## 2023-06-10 RX ADMIN — GADOBUTROL 8 ML: 604.72 INJECTION INTRAVENOUS at 07:25

## 2023-06-13 ENCOUNTER — TELEPHONE (OUTPATIENT)
Dept: OBGYN | Facility: CLINIC | Age: 78
End: 2023-06-13
Payer: COMMERCIAL

## 2023-06-13 NOTE — TELEPHONE ENCOUNTER
I left a voicemail message for the patient to return my call.  Perhaps you could try to reach her later as well  Thank you for your help with this

## 2023-06-14 ENCOUNTER — OFFICE VISIT (OUTPATIENT)
Dept: UROLOGY | Facility: CLINIC | Age: 78
End: 2023-06-14
Payer: COMMERCIAL

## 2023-06-14 VITALS
WEIGHT: 179 LBS | SYSTOLIC BLOOD PRESSURE: 102 MMHG | DIASTOLIC BLOOD PRESSURE: 60 MMHG | BODY MASS INDEX: 28.77 KG/M2 | HEIGHT: 66 IN

## 2023-06-14 DIAGNOSIS — N39.0 RECURRENT UTI: ICD-10-CM

## 2023-06-14 DIAGNOSIS — Z98.890 HISTORY OF SACROCOLPOPEXY: ICD-10-CM

## 2023-06-14 DIAGNOSIS — N81.10 PROLAPSE OF VAGINAL WALL: ICD-10-CM

## 2023-06-14 DIAGNOSIS — N81.11 CYSTOCELE, MIDLINE: Primary | ICD-10-CM

## 2023-06-14 PROCEDURE — 99215 OFFICE O/P EST HI 40 MIN: CPT | Performed by: OBSTETRICS & GYNECOLOGY

## 2023-06-14 NOTE — PATIENT INSTRUCTIONS
Thank you for coming to see me today. I hope that I was able to answer your questions. Please do not hesitate to call if you have any further questions or concerns.     We discussed the possible cause of your symptoms and what to do to prevent a recurrent bladder infection.   Supplements to prevent UTI to consider  -Probiotics  -Cranberry (for these products let them know a doctor is recommending them)   Ellura: [email or web address]   Theracran HP by [employer] [facility] [address]  -d-mannose 500mg twice a day  -Vitamin C 500-1000mg twice a day    I also placed an order to a urinalysis and culture in case your symptoms come back. You can go to any [facility] to get that test done.

## 2023-06-14 NOTE — PROGRESS NOTES
"June 14, 2023    Return visit    Patient returns today for f/u to discuss her MRI results and possible treatment options. Her MRI was not diagnostic in showing if there was a detachment of the mesh at wither the vaginal or sacral attachments. Kierra says that she really has no symptoms from the prolapse and that her biggest concern is recurrent UTIs. We discussed treatment options for the UTIs including increasing her vaginal estrace to daily, and starting D-mannose.    /60   Ht 1.664 m (5' 5.5\")   Wt 81.2 kg (179 lb)   LMP  (LMP Unknown)   BMI 29.33 kg/m    She is comfortable, in no distress, non-labored breathing.      A/P: 78 year old F with prolapse and recurrent UTIs    We discussed treatment options for her recurrent UTIs to include increasing her vaginal estrace to daily and starting D-Mannose. Will hold on antibiotics for now as Kierra did develop diarrhea after there last course. Kierra is also interested in using her pessary again.     I spent a total of 45 minutes with  Ms. Mcmullen  on the date of the encounter in chart review, face to face patient visit, review of tests, documentation and/or discussion with other providers about the issues documented above.    Jonathan Patel MD  Professor, OB/GYN  Urogynecologist    CC  Patient Care Team:  Matt Whitney MD as PCP - General (Internal Medicine)  Matt Whitney MD as Assigned PCP  Xavier Austin MD as Assigned OBGYN Provider  Jenna Nguyen PA-C as Physician Assistant (Urology)  Xavier Austin MD as Referring Physician (OB/Gyn)  Mariah Fierro MD as Assigned Surgical Provider  Jonathan Patel MD as MD (OB/Gyn)  SELF, REFERRED    "

## 2023-06-16 ENCOUNTER — HOSPITAL ENCOUNTER (OUTPATIENT)
Dept: MAMMOGRAPHY | Facility: CLINIC | Age: 78
Discharge: HOME | End: 2023-06-16
Attending: OBSTETRICS & GYNECOLOGY | Admitting: OBSTETRICS & GYNECOLOGY
Payer: COMMERCIAL

## 2023-06-16 DIAGNOSIS — Z12.31 VISIT FOR SCREENING MAMMOGRAM: ICD-10-CM

## 2023-06-16 PROCEDURE — 77067 SCR MAMMO BI INCL CAD: CPT

## 2023-06-23 ENCOUNTER — MYC MEDICAL ADVICE (OUTPATIENT)
Dept: INTERNAL MEDICINE | Facility: CLINIC | Age: 78
End: 2023-06-23
Payer: COMMERCIAL

## 2023-06-23 DIAGNOSIS — E07.9 THYROID DYSFUNCTION: ICD-10-CM

## 2023-06-23 RX ORDER — LEVOTHYROXINE SODIUM 75 UG/1
TABLET ORAL
Qty: 90 TABLET | Refills: 3 | Status: SHIPPED | OUTPATIENT
Start: 2023-06-23 | End: 2024-04-30

## 2023-06-23 NOTE — TELEPHONE ENCOUNTER
Refill request for Levothyroxine   Prescription approved per [facility] Refill Protocol.      TSH   Date Value Ref Range Status   04/18/2023 1.38 0.30 - 4.20 uIU/mL Final   10/31/2022 1.47 0.40 - 4.00 mU/L Final   04/16/2021 1.10 0.40 - 4.00 mU/L Final

## 2023-07-10 DIAGNOSIS — R31.9 HEMATURIA, UNSPECIFIED TYPE: Primary | ICD-10-CM

## 2023-07-28 ENCOUNTER — ANCILLARY PROCEDURE (OUTPATIENT)
Dept: BONE DENSITY | Facility: CLINIC | Age: 78
End: 2023-07-28
Attending: INTERNAL MEDICINE
Payer: COMMERCIAL

## 2023-07-28 DIAGNOSIS — Z78.0 MENOPAUSE: ICD-10-CM

## 2023-07-28 PROCEDURE — 77085 DXA BONE DENSITY AXL VRT FX: CPT | Performed by: INTERNAL MEDICINE

## 2023-08-02 ENCOUNTER — TELEPHONE (OUTPATIENT)
Dept: INTERNAL MEDICINE | Facility: CLINIC | Age: 78
End: 2023-08-02
Payer: COMMERCIAL

## 2023-08-02 NOTE — TELEPHONE ENCOUNTER
If Persistent URI symptoms with negative COVID test, can consider follow up visit for further evaluation and recommendations.

## 2023-08-02 NOTE — TELEPHONE ENCOUNTER
Pt tested today for COVID and she was Negative but  is Positive. Pt has symptoms for 2 weeks.   They were on a cruise returning on 7/27, from 7/12.   Found out from email today, that multiple people on cruise have Positive COVID.     Pt has URI symptoms since 7/17.     Pt has productive Cough, Chest congestion,   no fevers. Denies SOB.     Coughing up phlegm. More clear.     No headaches, no body aches, no fatigue.     She has been doing water aerobics. She coughs when in the water. But otherwise is tolerating exercise.     She checked her Oxygen level, 96% O2 and 58 pulse.     Should she schedule OV, or VV? She is concerned could have Pneumonia? She is concerned about being contagious or if they are outside of this window at this point?     Also sending 's enc too.

## 2023-08-03 ENCOUNTER — OFFICE VISIT (OUTPATIENT)
Dept: INTERNAL MEDICINE | Facility: CLINIC | Age: 78
End: 2023-08-03
Payer: COMMERCIAL

## 2023-08-03 ENCOUNTER — ANCILLARY PROCEDURE (OUTPATIENT)
Dept: GENERAL RADIOLOGY | Facility: CLINIC | Age: 78
End: 2023-08-03
Payer: COMMERCIAL

## 2023-08-03 VITALS
RESPIRATION RATE: 14 BRPM | HEART RATE: 80 BPM | DIASTOLIC BLOOD PRESSURE: 62 MMHG | HEIGHT: 66 IN | SYSTOLIC BLOOD PRESSURE: 115 MMHG | TEMPERATURE: 98.1 F | BODY MASS INDEX: 29.54 KG/M2 | OXYGEN SATURATION: 95 % | WEIGHT: 183.8 LBS

## 2023-08-03 DIAGNOSIS — R05.1 ACUTE COUGH: Primary | ICD-10-CM

## 2023-08-03 DIAGNOSIS — R05.1 ACUTE COUGH: ICD-10-CM

## 2023-08-03 LAB
BASOPHILS # BLD AUTO: 0 10E3/UL (ref 0–0.2)
BASOPHILS NFR BLD AUTO: 1 %
EOSINOPHIL # BLD AUTO: 0.2 10E3/UL (ref 0–0.7)
EOSINOPHIL NFR BLD AUTO: 3 %
ERYTHROCYTE [DISTWIDTH] IN BLOOD BY AUTOMATED COUNT: 14 % (ref 10–15)
HCT VFR BLD AUTO: 43.6 % (ref 35–47)
HGB BLD-MCNC: 14.2 G/DL (ref 11.7–15.7)
IMM GRANULOCYTES # BLD: 0 10E3/UL
IMM GRANULOCYTES NFR BLD: 0 %
LYMPHOCYTES # BLD AUTO: 1.6 10E3/UL (ref 0.8–5.3)
LYMPHOCYTES NFR BLD AUTO: 20 %
MCH RBC QN AUTO: 29.1 PG (ref 26.5–33)
MCHC RBC AUTO-ENTMCNC: 32.6 G/DL (ref 31.5–36.5)
MCV RBC AUTO: 89 FL (ref 78–100)
MONOCYTES # BLD AUTO: 0.7 10E3/UL (ref 0–1.3)
MONOCYTES NFR BLD AUTO: 8 %
NEUTROPHILS # BLD AUTO: 5.3 10E3/UL (ref 1.6–8.3)
NEUTROPHILS NFR BLD AUTO: 68 %
PLATELET # BLD AUTO: 307 10E3/UL (ref 150–450)
RBC # BLD AUTO: 4.88 10E6/UL (ref 3.8–5.2)
WBC # BLD AUTO: 7.8 10E3/UL (ref 4–11)

## 2023-08-03 PROCEDURE — 71046 X-RAY EXAM CHEST 2 VIEWS: CPT | Mod: TC | Performed by: INTERNAL MEDICINE

## 2023-08-03 PROCEDURE — 36415 COLL VENOUS BLD VENIPUNCTURE: CPT

## 2023-08-03 PROCEDURE — 99213 OFFICE O/P EST LOW 20 MIN: CPT

## 2023-08-03 PROCEDURE — 85025 COMPLETE CBC W/AUTO DIFF WBC: CPT

## 2023-08-03 ASSESSMENT — ENCOUNTER SYMPTOMS: COUGH: 1

## 2023-08-03 NOTE — PROGRESS NOTES
Assessment & Plan     Acute cough  CXR pending, CBC WNL.     If CXR is negative this is likely a bronchitis after URI. Declined cough suppressant at this time    Patient began feeling ill on 7/17. A lot of her close contacts, and  have since been diagnosed with COVID although she tested negative. Her only symptoms currently is a dry cough she is otherwise feeling well. Since she is over 10 days from symptom start, and is not having fever I do not think covid testing will add any useful information to clinical picture  - CBC with platelets and differential; Future  - XR Chest 2 Views; Future  - CBC with platelets and differential      Henrik Braga NP  [facility]ada is a 78 year old, presenting for the following health issues:  Cough (She and her  were on a cruise and were exposed to covid. Her  tested positive for covid yesterday but not her. She has all the symptoms. She wants to know if she has pneumonia. She also has mucinex at home and wondering if she should be taking it.)      8/3/2023     2:33 PM   Additional Questions   Roomed by Patrica Jeffrey MA   Accompanied by Herself       Cough    History of Present Illness       Reason for visit:  Chest congestion  Symptom onset:  1-2 weeks ago  Symptoms include:  Runny nose, chest congestion, coughing  Symptom intensity:  Moderate  Symptom progression:  Staying the same  Had these symptoms before:  No  What makes it worse:  No  What makes it better:  No    She eats 4 or more servings of fruits and vegetables daily.She consumes 0 sweetened beverage(s) daily.She exercises with enough effort to increase her heart rate 10 to 19 minutes per day.  She exercises with enough effort to increase her heart rate 5 days per week.   She is taking medications regularly.     Had covid on what she thinks was the 17th of July    Never had a fever, is spitting up whitish phlegm    Cough was bringing a lot stuff up at  "that time - ongoing for about 2 weeks         Review of Systems   Respiratory:  Positive for cough.       Constitutional, HEENT, cardiovascular, pulmonary, gi and gu systems are negative, except as otherwise noted.      Objective    /62 (BP Location: Right arm, Patient Position: Sitting, Cuff Size: Adult Large)   Pulse 80   Temp 98.1  F (36.7  C) (Tympanic)   Resp 14   Ht 1.664 m (5' 5.5\")   Wt 83.4 kg (183 lb 12.8 oz)   LMP  (LMP Unknown)   SpO2 95%   BMI 30.12 kg/m    Body mass index is 30.12 kg/m .  Physical Exam   GENERAL: alert and no distress  NECK: no adenopathy, no asymmetry, masses, or scars and thyroid normal to palpation  RESP: lungs clear to auscultation - no rales, rhonchi or wheezes  CV: regular rate and rhythm, normal S1 S2, no S3 or S4, no murmur, click or rub, no peripheral edema and peripheral pulses strong  MS: no gross musculoskeletal defects noted, no edema  SKIN: no suspicious lesions or rashes  NEURO: Normal strength and tone, mentation intact and speech normal  PSYCH: mentation appears normal, affect normal/bright          "

## 2023-08-21 ENCOUNTER — TELEPHONE (OUTPATIENT)
Dept: INTERNAL MEDICINE | Facility: CLINIC | Age: 78
End: 2023-08-21
Payer: COMMERCIAL

## 2023-08-21 NOTE — TELEPHONE ENCOUNTER
Reason for Call:  Appointment Request    Patient requesting this type of appt: Pre-op    Requested provider: Matt Whitney    Reason patient unable to be scheduled: Not within requested timeframe    When does patient want to be seen/preferred time:  Before DOS:9/26/23    Comments: Patient wants to have her  Sage Mcmullen schedule at the same time has hers. They both has surgery close together for cataract.    Could we send this information to you in [employer] [place] or would you prefer to receive a phone call?:   Patient would prefer a phone call   Okay to leave a detailed message?: Yes at Cell number on file:    Telephone Information:   Mobile [phone]       Call taken on 8/21/2023 at 1:31 PM by Patricia Lopez

## 2023-08-23 ENCOUNTER — MYC MEDICAL ADVICE (OUTPATIENT)
Dept: INTERNAL MEDICINE | Facility: CLINIC | Age: 78
End: 2023-08-23
Payer: COMMERCIAL

## 2023-08-25 ENCOUNTER — LAB (OUTPATIENT)
Dept: LAB | Facility: CLINIC | Age: 78
End: 2023-08-25
Payer: COMMERCIAL

## 2023-08-25 DIAGNOSIS — R31.9 HEMATURIA, UNSPECIFIED TYPE: ICD-10-CM

## 2023-08-25 PROCEDURE — 87086 URINE CULTURE/COLONY COUNT: CPT

## 2023-08-25 PROCEDURE — 81001 URINALYSIS AUTO W/SCOPE: CPT

## 2023-08-25 PROCEDURE — 87186 SC STD MICRODIL/AGAR DIL: CPT

## 2023-08-26 LAB — BACTERIA UR CULT: ABNORMAL

## 2023-08-27 DIAGNOSIS — N39.0 RECURRENT UTI: Primary | ICD-10-CM

## 2023-08-27 RX ORDER — SULFAMETHOXAZOLE AND TRIMETHOPRIM 400; 80 MG/1; MG/1
1 TABLET ORAL 2 TIMES DAILY
Qty: 14 TABLET | Refills: 0 | Status: SHIPPED | OUTPATIENT
Start: 2023-08-27 | End: 2023-09-03

## 2023-09-07 ENCOUNTER — TRANSFERRED RECORDS (OUTPATIENT)
Dept: HEALTH INFORMATION MANAGEMENT | Facility: CLINIC | Age: 78
End: 2023-09-07
Payer: COMMERCIAL

## 2023-09-11 DIAGNOSIS — N39.0 RECURRENT UTI: Primary | ICD-10-CM

## 2023-09-12 PROCEDURE — 81001 URINALYSIS AUTO W/SCOPE: CPT

## 2023-09-12 PROCEDURE — 87086 URINE CULTURE/COLONY COUNT: CPT

## 2023-09-13 ENCOUNTER — LAB (OUTPATIENT)
Dept: LAB | Facility: CLINIC | Age: 78
End: 2023-09-13
Payer: COMMERCIAL

## 2023-09-13 DIAGNOSIS — N39.0 RECURRENT UTI: ICD-10-CM

## 2023-09-13 LAB
ALBUMIN UR-MCNC: NEGATIVE MG/DL
APPEARANCE UR: ABNORMAL
BACTERIA #/AREA URNS HPF: ABNORMAL /HPF
BILIRUB UR QL STRIP: NEGATIVE
COLOR UR AUTO: YELLOW
GLUCOSE UR STRIP-MCNC: NEGATIVE MG/DL
HGB UR QL STRIP: ABNORMAL
KETONES UR STRIP-MCNC: NEGATIVE MG/DL
LEUKOCYTE ESTERASE UR QL STRIP: ABNORMAL
NITRATE UR QL: NEGATIVE
PH UR STRIP: 5.5 [PH] (ref 5–7)
RBC #/AREA URNS AUTO: ABNORMAL /HPF
SP GR UR STRIP: 1.01 (ref 1–1.03)
SQUAMOUS #/AREA URNS AUTO: ABNORMAL /LPF
UROBILINOGEN UR STRIP-ACNC: 0.2 E.U./DL
WBC #/AREA URNS AUTO: ABNORMAL /HPF

## 2023-09-14 LAB — BACTERIA UR CULT: NORMAL

## 2023-09-19 NOTE — PROGRESS NOTES
Ice off-and-on to sore areas whenever hurting.  Over-the-counter Tylenol or ibuprofen every 6 hours as needed for pain.  See your clinic/doctor if not better or improved in the next week.  See them sooner or return if worse or problems.   Kierra Mcmullen is a 77 year old female  menopausal status post robotic supracervical hysterectomy BSO sacrocolpopexy on 2012 for pelvic floor relaxation who has a history of recurrent UTIs who on 2022 underwent a right total knee arthroplasty  with Dr. Castillo at [facility] would been using a #3 diaphragm type pessary for a recurrent cystocele enterocele presents today for a follow-up.  The patient states that today was the first day that she is use the pessary since her knee replacement.  At present she states she is having no pain or discomfort has very acceptable bowel and bladder function and control and is doing well.  She denies any dysuria or symptoms to suggest a UTI.  See the previous office notes from 2022 for further background details    Past Medical History:   Diagnosis Date     Adenomatous polyp of colon 2011    needs conolonospy 2016     Arthritis 2005     Hypothyroidism     Abstracted 02     Osteopenia      Current Outpatient Medications   Medication     Biotin 5000 MCG CAPS     CALCIUM + D 600-200 MG-IU OR TABS     calcium polycarbophil (FIBERCON) 625 MG tablet     COMPOUNDED NON-CONTROLLED SUBSTANCE (CMPD RX) - PHARMACY TO MIX COMPOUNDED MEDICATION     GLUCOSAMINE CHONDROITIN COMPLX PO     levothyroxine (SYNTHROID/LEVOTHROID) 88 MCG tablet     MULTI-DAY OR TABS     sulfamethoxazole-trimethoprim (BACTRIM DS) 800-160 MG tablet     No current facility-administered medications for this visit.     /74   Wt 83 kg (183 lb)   LMP 1997   BMI 30.22 kg/m    Constitutional: healthy, alert and no distress  Right knee incision appears to be healing well.  Her range of motion is increasing as per her account and she is to begin physical therapy tomorrow.    In light of the fact that today was the first day that she is use the pessary and is presently asymptomatic without any urinary tract symptoms I elected to forego on exam.  I discussed placing the pessary in  the morning and removing it at night.  I discussed the rationale behind this.  I also discussed that she could leave it in for a day or 2 and then replace it.  I reviewed at length the signs and symptoms of concern and the patient will call should these occur    (E07.9) Thyroid dysfunction  (primary encounter diagnosis)  Comment: She presently is on levothyroxine 88 mcg daily  Plan: TSH with free T4 reflex        I will recheck thyroid functions with appropriate therapy to follow    (K46.9) Enterocele  Comment: Symptoms are well controlled with a #3 diaphragm pessary.  She also has a number for diaphragm pessary which is 2 and three-quarter inches  Plan: She will continue with her daily change routine.  I like to see her back in a month for recheck or sooner should concerns arise    (N81.11) Cystocele, midline  Comment: Plan reviewed with the patient and her   Plan: Done

## 2023-09-20 ENCOUNTER — OFFICE VISIT (OUTPATIENT)
Dept: INTERNAL MEDICINE | Facility: CLINIC | Age: 78
End: 2023-09-20
Payer: COMMERCIAL

## 2023-09-20 VITALS
WEIGHT: 179.6 LBS | HEART RATE: 66 BPM | HEIGHT: 66 IN | BODY MASS INDEX: 28.87 KG/M2 | DIASTOLIC BLOOD PRESSURE: 69 MMHG | SYSTOLIC BLOOD PRESSURE: 110 MMHG | RESPIRATION RATE: 16 BRPM | TEMPERATURE: 97.8 F | OXYGEN SATURATION: 98 %

## 2023-09-20 DIAGNOSIS — Z01.818 PREOP GENERAL PHYSICAL EXAM: Primary | ICD-10-CM

## 2023-09-20 DIAGNOSIS — E03.9 ACQUIRED HYPOTHYROIDISM: ICD-10-CM

## 2023-09-20 DIAGNOSIS — H25.013 CORTICAL AGE-RELATED CATARACT OF BOTH EYES: ICD-10-CM

## 2023-09-20 LAB
ALBUMIN SERPL BCG-MCNC: 4 G/DL (ref 3.5–5.2)
ALP SERPL-CCNC: 73 U/L (ref 35–104)
ALT SERPL W P-5'-P-CCNC: 11 U/L (ref 0–50)
ANION GAP SERPL CALCULATED.3IONS-SCNC: 12 MMOL/L (ref 7–15)
AST SERPL W P-5'-P-CCNC: 20 U/L (ref 0–45)
BILIRUB SERPL-MCNC: 0.4 MG/DL
BUN SERPL-MCNC: 18.8 MG/DL (ref 8–23)
CALCIUM SERPL-MCNC: 9.3 MG/DL (ref 8.8–10.2)
CHLORIDE SERPL-SCNC: 103 MMOL/L (ref 98–107)
CREAT SERPL-MCNC: 0.93 MG/DL (ref 0.51–0.95)
DEPRECATED HCO3 PLAS-SCNC: 26 MMOL/L (ref 22–29)
EGFRCR SERPLBLD CKD-EPI 2021: 63 ML/MIN/1.73M2
ERYTHROCYTE [DISTWIDTH] IN BLOOD BY AUTOMATED COUNT: 13.6 % (ref 10–15)
GLUCOSE SERPL-MCNC: 133 MG/DL (ref 70–99)
HCT VFR BLD AUTO: 43.6 % (ref 35–47)
HGB BLD-MCNC: 14.5 G/DL (ref 11.7–15.7)
MCH RBC QN AUTO: 29.4 PG (ref 26.5–33)
MCHC RBC AUTO-ENTMCNC: 33.3 G/DL (ref 31.5–36.5)
MCV RBC AUTO: 88 FL (ref 78–100)
PLATELET # BLD AUTO: 278 10E3/UL (ref 150–450)
POTASSIUM SERPL-SCNC: 3.7 MMOL/L (ref 3.4–5.3)
PROT SERPL-MCNC: 7.2 G/DL (ref 6.4–8.3)
RBC # BLD AUTO: 4.93 10E6/UL (ref 3.8–5.2)
SODIUM SERPL-SCNC: 141 MMOL/L (ref 136–145)
WBC # BLD AUTO: 7.6 10E3/UL (ref 4–11)

## 2023-09-20 PROCEDURE — 80053 COMPREHEN METABOLIC PANEL: CPT | Performed by: INTERNAL MEDICINE

## 2023-09-20 PROCEDURE — 36415 COLL VENOUS BLD VENIPUNCTURE: CPT | Performed by: INTERNAL MEDICINE

## 2023-09-20 PROCEDURE — 85027 COMPLETE CBC AUTOMATED: CPT | Performed by: INTERNAL MEDICINE

## 2023-09-20 PROCEDURE — 99214 OFFICE O/P EST MOD 30 MIN: CPT | Performed by: INTERNAL MEDICINE

## 2023-09-20 ASSESSMENT — PAIN SCALES - GENERAL: PAINLEVEL: NO PAIN (0)

## 2023-09-20 NOTE — LETTER
September 21, 2023      Kierra ZAYAS [place]  [address] [place] [address]        Dear ,    We are writing to inform you of your test results.    Your results are within normal limits.    Resulted Orders   Comprehensive metabolic panel (BMP + Alb, Alk Phos, ALT, AST, Total. Bili, TP)   Result Value Ref Range    Sodium 141 136 - 145 mmol/L    Potassium 3.7 3.4 - 5.3 mmol/L    Chloride 103 98 - 107 mmol/L    Carbon Dioxide (CO2) 26 22 - 29 mmol/L    Anion Gap 12 7 - 15 mmol/L    Urea Nitrogen 18.8 8.0 - 23.0 mg/dL    Creatinine 0.93 0.51 - 0.95 mg/dL    Calcium 9.3 8.8 - 10.2 mg/dL    Glucose 133 (H) 70 - 99 mg/dL    Alkaline Phosphatase 73 35 - 104 U/L    AST 20 0 - 45 U/L      Comment:      Reference intervals for this test were updated on 6/12/2023 to more accurately reflect our healthy population. There may be differences in the flagging of prior results with similar values performed with this method. Interpretation of those prior results can be made in the context of the updated reference intervals.    ALT 11 0 - 50 U/L      Comment:      Reference intervals for this test were updated on 6/12/2023 to more accurately reflect our healthy population. There may be differences in the flagging of prior results with similar values performed with this method. Interpretation of those prior results can be made in the context of the updated reference intervals.      Protein Total 7.2 6.4 - 8.3 g/dL    Albumin 4.0 3.5 - 5.2 g/dL    Bilirubin Total 0.4 <=1.2 mg/dL    GFR Estimate 63 >60 mL/min/1.73m2   CBC with platelets   Result Value Ref Range    WBC Count 7.6 4.0 - 11.0 10e3/uL    RBC Count 4.93 3.80 - 5.20 10e6/uL    Hemoglobin 14.5 11.7 - 15.7 g/dL    Hematocrit 43.6 35.0 - 47.0 %    MCV 88 78 - 100 fL    MCH 29.4 26.5 - 33.0 pg    MCHC 33.3 31.5 - 36.5 g/dL    RDW 13.6 10.0 - 15.0 %    Platelet Count 278 150 - 450 10e3/uL       If you have any questions or concerns, please call the clinic at the  number listed above.       Sincerely,      Matt Whitney MD

## 2023-09-20 NOTE — PROGRESS NOTES
[place] [address]  [address]  [facility] [address]  Phone: [phone]  Primary Provider: Jess Whitney  Pre-op Performing Provider: JESS WHITNEY      PREOPERATIVE EVALUATION:  Today's date: 9/20/2023    Kierra is a 78 year old female who presents for a preoperative evaluation.      9/20/2023    12:55 PM   Additional Questions   Roomed by Nasima GOULD   Accompanied by , keo       Surgical Information:  Surgery/Procedure: cataract removal  Surgery Location: [facility]  Surgeon: Dr. Gagnon  Surgery Date: 09/26/2023  Time of Surgery: 0800    Where patient plans to recover: At home with family  Fax number for surgical facility: [phone]    Assessment & Plan     The proposed surgical procedure is considered LOW risk.    Preop general physical exam  Assess lab work   - Comprehensive metabolic panel (BMP + Alb, Alk Phos, ALT, AST, Total. Bili, TP)  - CBC with platelets    Cortical age-related cataract of both eyes  Cleared for surgery     Acquired hypothyroidism  Continue Synthroid             - No identified additional risk factors other than previously addressed    Antiplatelet or Anticoagulation Medication Instructions:   - Patient is on no antiplatelet or anticoagulation medications.    Additional Medication Instructions:  Patient is to take all scheduled medications on the day of surgery    RECOMMENDATION:  APPROVAL GIVEN to proceed with proposed procedure, without further diagnostic evaluation.            Subjective       HPI related to upcoming procedure: scheduled for bilateral cataract surgeries.   No acute complaints, no medication change or new medical conditions.  Has history of hypothyroidism. On replacement treatment with Synthroid. No heat /cold intolerance, heart palpitations, weight loss/ gain ,  change in bowel habits.  Has had right lower chest wall pain for 2 weeks, radiating to the side and back, worse with  movements, stretching, now improved. No fever, abdominal pain, nausea, pain with breathing or exercise.         9/14/2023     6:56 AM   Preop Questions   1. Have you ever had a heart attack or stroke? No   2. Have you ever had surgery on your heart or blood vessels, such as a stent placement, a coronary artery bypass, or surgery on an artery in your head, neck, heart, or legs? No   3. Do you have chest pain with activity? No   4. Do you have a history of  heart failure? No   5. Do you currently have a cold, bronchitis or symptoms of other infection? No   6. Do you have a cough, shortness of breath, or wheezing? No   7. Do you or anyone in your family have previous history of blood clots? No   8. Do you or does anyone in your family have a serious bleeding problem such as prolonged bleeding following surgeries or cuts? No   9. Have you ever had problems with anemia or been told to take iron pills? No   10. Have you had any abnormal blood loss such as black, tarry or bloody stools, or abnormal vaginal bleeding? No   11. Have you ever had a blood transfusion? No   12. Are you willing to have a blood transfusion if it is medically needed before, during, or after your surgery? Yes   13. Have you or any of your relatives ever had problems with anesthesia? No   14. Do you have sleep apnea, excessive snoring or daytime drowsiness? No   15. Do you have any artifical heart valves or other implanted medical devices like a pacemaker, defibrillator, or continuous glucose monitor? No   16. Do you have artificial joints? YES -    17. Are you allergic to latex? No       Health Care Directive:  Patient has a Health Care Directive on file      Preoperative Review of :   reviewed - no record of controlled substances prescribed.      Status of Chronic Conditions:  HYPOTHYROIDISM - Patient has a longstanding history of chronic Hypothyroidism. Patient has been doing well, noting no tremor, insomnia, hair loss or changes in skin  texture. Continues to take medications as directed, without adverse reactions or side effects. Last TSH   Lab Results   Component Value Date    TSH 1.38 04/18/2023   .      Review of Systems  Constitutional, neuro, ENT, endocrine, pulmonary, cardiac, gastrointestinal, genitourinary, musculoskeletal, integument and psychiatric systems are negative, except as otherwise noted.    Patient Active Problem List    Diagnosis Date Noted    Greater trochanteric bursitis of left hip 09/02/2022     Priority: Medium    Pyuria, sterile 04/28/2021     Priority: Medium    History of total knee arthroplasty, left 11/19/2020     Priority: Medium    Degenerative arthritis of knee 05/19/2015     Priority: Medium    Hypothyroidism      Priority: Medium     Abstracted 7/16/02      Osteopenia      Priority: Medium    Adenomatous polyp of colon      Priority: Medium    Postmenopausal bleeding 12/21/2011     Priority: Medium    Cystocele, midline 12/21/2011     Priority: Medium    Enterocele 12/21/2011     Priority: Medium    Advance Care Planning 12/06/2011     Priority: Medium     Advance Care Planning 6/19/2015: Receipt of ACP document:  Received: Health Care Directive which was witnessed or notarized on 12-6-12.  Document previously scanned on 5-19-15.  Validation form completed and sent to be scanned.  Code Status reflects choices in most recent ACP document.  Confirmed/documented designated decision maker(s).  Added by Bonnie Perea RN, System ACP Coordinator [employer] Choices   [id number] states has Advance Directive and will bring in a copy to clinic. Vivi France LPN      Recurrent UTI 07/20/2009     Priority: Medium    Prolapse of vaginal wall 11/21/2006     Priority: Medium     Problem list name updated by automated process. Provider to review      Obesity 12/03/2002     Priority: Medium     Problem list name updated by automated process. Provider to review        Past Medical History:   Diagnosis Date    Adenomatous polyp of  colon 2011    needs conolonospy 2016    Arthritis 2005    Hypothyroidism     Abstracted 02    Osteopenia      Past Surgical History:   Procedure Laterality Date    ARTHROPLASTY KNEE Left 2015    Procedure: ARTHROPLASTY KNEE;  Surgeon: Daniel Mack MD;  Location: [facility] OR    CHOLECYSTECTOMY      COLONOSCOPY N/A 2016    Procedure: COLONOSCOPY;  Surgeon: Sage Berg MD;  Location:  GI    CYSTOSCOPY  2012    Procedure:CYSTOSCOPY; Surgeon:GAMA GRIDER; Location:[facility] OR    DAVINCI HYSTERECTOMY SUPRACERVICAL, SACROCOLPOPEXY, COMBINED  2012    Procedure:COMBINED DAVINCI HYSTERECTOMY SUPRACERVICAL, SACROCOLPOPEXY; DAVINCI LAPAROSCOPIC SUPRACERVICAL HYSTERECTOMY, BILATERAL SALPINGO-OOPHORECTOMY, SACROCOLPOPEXY  WITH COLOPLASTY MESH AND CYSTOPLASTY; Surgeon:GAMA GRIDER; Location: OR    HC REMOVAL OF TONSILS,<13 Y/O      Tonsils <12y.o.    HC TOOTH EXTRACTION W/FORCEP      ZZC NONSPECIFIC PROCEDURE      Cholecystectomy -- Abstracted 02    ZZ COLONOSCOPY THRU STOMA, DIAGNOSTIC       Current Outpatient Medications   Medication Sig Dispense Refill    Biotin 5000 MCG CAPS       CALCIUM + D 600-200 MG-IU OR TABS 1 TABLET DAILY 30 0    calcium polycarbophil (FIBERCON) 625 MG tablet Take 2 tablets by mouth daily      COMPOUNDED NON-CONTROLLED SUBSTANCE (CMPD RX) - PHARMACY TO MIX COMPOUNDED MEDICATION Estradiol 0.02% in HRT heavy (cream base)  Place in tube and include vaginal applicator Si/2 gm intravaginally at HS on Mon and Th pm as directed 42 g 3    GLUCOSAMINE CHONDROITIN COMPLX PO Take 1 tablet by mouth daily 750/600      levothyroxine (SYNTHROID/LEVOTHROID) 75 MCG tablet Take Levothyroxine 75 mcg PO daily 90 tablet 3    MULTI-DAY OR TABS one PO QD         No Known Allergies     Social History     Tobacco Use    Smoking status: Former     Packs/day: 1.00     Years: 25.00     Pack years: 25.00     Types: Cigarettes     Start date: 1962     Quit date:  "1987     Years since quittin.7     Passive exposure: Past    Smokeless tobacco: Never    Tobacco comments:        Substance Use Topics    Alcohol use: Yes     Alcohol/week: 10.0 standard drinks of alcohol     Comment: 1-2 glasses of wine/week     Family History   Problem Relation Age of Onset    Alzheimer Disease Father     Cancer Mother          non-Hodgkins lymphoma    Osteoporosis Mother     Arthritis Mother         RA    Gastrointestinal Disease Mother         UC    Thyroid Disease Mother     Other Cancer Mother         Non-Hodgkins Lymphoma    Osteoporosis Sister     Gastrointestinal Disease Sister         diverticulitis    Cardiovascular Maternal Aunt         MI    Breast Cancer Paternal Aunt      History   Drug Use No         Objective     /69 (BP Location: Left arm, Cuff Size: Adult Regular)   Pulse 66   Temp 97.8  F (36.6  C) (Temporal)   Resp 16   Ht 1.664 m (5' 5.5\")   Wt 81.5 kg (179 lb 9.6 oz)   LMP  (LMP Unknown)   SpO2 98%   BMI 29.43 kg/m      Physical Exam    GENERAL APPEARANCE: healthy, alert and no distress     EYES: EOMI, PERRL     HENT: ear canals and TM's normal and nose and mouth without ulcers or lesions     NECK: no adenopathy, no asymmetry, masses, or scars and thyroid normal to palpation     RESP: lungs clear to auscultation - no rales, rhonchi or wheezes     CV: regular rates and rhythm, normal S1 S2, no S3 or S4 and no murmur, click or rub     ABDOMEN:  soft, nontender, no HSM or masses and bowel sounds normal     MS: extremities normal- no gross deformities noted, no evidence of inflammation in joints, FROM in all extremities.     SKIN: no suspicious lesions or rashes     NEURO: Normal strength and tone, sensory exam grossly normal, mentation intact and speech normal     PSYCH: mentation appears normal. and affect normal/bright     LYMPHATICS: No cervical adenopathy    Recent Labs   Lab Test 23  1502 23  0856 22  0946   HGB 14.2 14.6 " 14.9    276 247   NA  --  140 140   POTASSIUM  --  4.3 3.9   CR  --  1.01* 0.86        Diagnostics:  Labs pending at this time.  Results will be reviewed when available.   No EKG required for low risk surgery (cataract, skin procedure, breast biopsy, etc).    Revised Cardiac Risk Index (RCRI):  The patient has the following serious cardiovascular risks for perioperative complications:   - No serious cardiac risks = 0 points     RCRI Interpretation: 0 points: Class I (very low risk - 0.4% complication rate)         Signed Electronically by: Matt Whitney MD  Copy of this evaluation report is provided to requesting physician.

## 2023-11-01 ENCOUNTER — OFFICE VISIT (OUTPATIENT)
Dept: UROLOGY | Facility: CLINIC | Age: 78
End: 2023-11-01
Payer: COMMERCIAL

## 2023-11-01 VITALS
DIASTOLIC BLOOD PRESSURE: 82 MMHG | WEIGHT: 182 LBS | HEIGHT: 66 IN | SYSTOLIC BLOOD PRESSURE: 128 MMHG | BODY MASS INDEX: 29.25 KG/M2

## 2023-11-01 DIAGNOSIS — N39.41 URGE INCONTINENCE: Primary | ICD-10-CM

## 2023-11-01 DIAGNOSIS — N30.00 ACUTE CYSTITIS WITHOUT HEMATURIA: ICD-10-CM

## 2023-11-01 DIAGNOSIS — N81.11 CYSTOCELE, MIDLINE: ICD-10-CM

## 2023-11-01 DIAGNOSIS — N95.2 VAGINAL ATROPHY: ICD-10-CM

## 2023-11-01 DIAGNOSIS — Z98.890 HISTORY OF SACROCOLPOPEXY: ICD-10-CM

## 2023-11-01 DIAGNOSIS — N39.0 RECURRENT UTI: ICD-10-CM

## 2023-11-01 PROCEDURE — 99215 OFFICE O/P EST HI 40 MIN: CPT | Performed by: OBSTETRICS & GYNECOLOGY

## 2023-11-01 RX ORDER — ACETAMINOPHEN 325 MG/1
975 TABLET ORAL ONCE
Status: CANCELLED | OUTPATIENT
Start: 2023-11-01 | End: 2023-11-01

## 2023-11-01 RX ORDER — GRANULES FOR ORAL 3 G/1
3 POWDER ORAL ONCE
Qty: 1 PACKET | Refills: 0 | Status: SHIPPED | OUTPATIENT
Start: 2023-11-01 | End: 2023-11-01

## 2023-11-01 RX ORDER — PHENAZOPYRIDINE HYDROCHLORIDE 100 MG/1
200 TABLET, FILM COATED ORAL ONCE
Status: CANCELLED | OUTPATIENT
Start: 2023-11-01 | End: 2023-11-01

## 2023-11-01 NOTE — PROGRESS NOTES
"November 1, 2023    Return visit    Patient returns today to discuss occ vaginal bleeding that she is experiencing and possible options for her prolapse. Since her last visit she has only had one UTI. Kierra continues to experience pelvic pressure and discomfort. She has tried to insert her pessary, which will give her some relief, but ends up causing more discomfort.    Kierra is also experiencing urinary urgency and occ UUI.    /82   Ht 1.664 m (5' 5.5\")   Wt 82.6 kg (182 lb)   LMP  (LMP Unknown)   BMI 29.83 kg/m    She is comfortable, in no distress, non-labored breathing.  Abdomen is soft, non-tender, non-distended.  Normal external female genitalia.  Speculum and bimanual exam are remarkable for a large cystocele. There is good posterior support with palpable mesh attachment on the posterior/apical vaginal wall and a widened genital hiatus. Vaginal atrophy    A/P: 78 year old F with large cystocele s/p RA sacrocolpopexy    We discussed the diagnosis and treatment options. Treatment options to include:  1) observation with f/u in 6 -12 months  2) pessary fitting  3) surgical management    Surgical options to include an anterior repair or RA exploration with attempted reattachment of her previous sacrocolpopexy mesh. We discussed the risks benefits and alternatives to each procedure and the patient wishes to do an anterior repair.    We discussed that the risks to the procedure include but not limited to bleeding, infection, injury to adjacent organs including bowel, bladder, and blood vessels, conversion to open procedure, de hilton or worsening stress incontinence or urge incontinence, need for post-operative patiño catheter, need for further procedures including for mesh extrusion or erosion and possible failure of the procedure with recurrence of her prolapse.  Patient expressed understanding and agreeable to proceed.    Will refer to PFPT fo her urinary urgency. Given an Rx for Fosfomycin in case she gets " a UTI while in [place] I refilled her compounded estrogen cream.     I spent a total of 45 minutes with  Ms. Mcmullen  on the date of the encounter in chart review, face to face patient visit, review of tests, documentation and/or discussion with other providers about the issues documented above.    Jonathan Patel MD  Professor, OB/GYN  Urogynecologist    CC  Patient Care Team:  Matt Whitney MD as PCP - General (Internal Medicine)  Matt Whitney MD as Assigned PCP  Xavier Austin MD as Assigned OBGYN Provider  Jenna Nguyen PA-C as Physician Assistant (Urology)  Xavier Austin MD as Referring Physician (OB/Gyn)  Jonathan Patel MD as MD (OB/Gyn)  Jonathan Patel MD as MD (OB/Gyn)  SELF, REFERRED

## 2023-11-29 ENCOUNTER — TRANSFERRED RECORDS (OUTPATIENT)
Dept: HEALTH INFORMATION MANAGEMENT | Facility: CLINIC | Age: 78
End: 2023-11-29
Payer: COMMERCIAL

## 2023-12-04 ENCOUNTER — OFFICE VISIT (OUTPATIENT)
Dept: OBGYN | Facility: CLINIC | Age: 78
End: 2023-12-04
Payer: COMMERCIAL

## 2023-12-04 VITALS — SYSTOLIC BLOOD PRESSURE: 102 MMHG | DIASTOLIC BLOOD PRESSURE: 60 MMHG

## 2023-12-04 DIAGNOSIS — N81.11 CYSTOCELE, MIDLINE: Primary | ICD-10-CM

## 2023-12-04 PROCEDURE — 99214 OFFICE O/P EST MOD 30 MIN: CPT | Performed by: OBSTETRICS & GYNECOLOGY

## 2023-12-04 NOTE — NURSING NOTE
"Chief Complaint   Patient presents with    Consult       Initial /60   LMP  (LMP Unknown)  Estimated body mass index is 29.83 kg/m  as calculated from the following:    Height as of 23: 1.664 m (5' 5.5\").    Weight as of 23: 82.6 kg (182 lb).  BP completed using cuff size: regular    Questioned patient about current smoking habits.  Pt. has never smoked.          The following HM Due: NONE      The following patient reported/Care Every where data was sent to:  P ABSTRACT QUALITY INITIATIVES [[id number]]        Yolande Lynn, NEGRA                 "

## 2023-12-04 NOTE — PATIENT INSTRUCTIONS
You can reach your [facility] Care Team any time of the day by calling [phone]. This number will put you in touch with the 24 hour nurse line if the clinic is closed.    To contact your OB/GYN Station Coordinator/Surgery Scheduler please call [phone]. This is a direct number for your care team between 8 a.m. and 4 p.m. Monday through Friday.    [facility] is open for your convenience:  Monday through Friday 8 a.m. to 6 p.m.  Closed weekends and all major holidays.

## 2023-12-05 ENCOUNTER — OFFICE VISIT (OUTPATIENT)
Dept: OBGYN | Facility: CLINIC | Age: 78
End: 2023-12-05
Payer: COMMERCIAL

## 2023-12-05 VITALS — DIASTOLIC BLOOD PRESSURE: 74 MMHG | SYSTOLIC BLOOD PRESSURE: 110 MMHG

## 2023-12-05 DIAGNOSIS — N81.11 CYSTOCELE, MIDLINE: ICD-10-CM

## 2023-12-05 DIAGNOSIS — R30.0 DYSURIA: Primary | ICD-10-CM

## 2023-12-05 PROCEDURE — 57160 INSERT PESSARY/OTHER DEVICE: CPT | Performed by: OBSTETRICS & GYNECOLOGY

## 2023-12-05 PROCEDURE — 99213 OFFICE O/P EST LOW 20 MIN: CPT | Mod: 25 | Performed by: OBSTETRICS & GYNECOLOGY

## 2023-12-05 PROCEDURE — A4562 PESSARY, NON RUBBER,ANY TYPE: HCPCS | Performed by: OBSTETRICS & GYNECOLOGY

## 2023-12-05 RX ORDER — OXYQUINOLINE/BORIC ACID 0.025 %
1 JELLY WITH APPLICATOR (GRAM) VAGINAL DAILY PRN
Qty: 113 G | Refills: 3 | Status: SHIPPED | OUTPATIENT
Start: 2023-12-05

## 2023-12-05 RX ORDER — NITROFURANTOIN 25; 75 MG/1; MG/1
100 CAPSULE ORAL 2 TIMES DAILY
Qty: 14 CAPSULE | Refills: 3 | Status: SHIPPED | OUTPATIENT
Start: 2023-12-05 | End: 2023-12-12

## 2023-12-05 NOTE — PROGRESS NOTES
Kierra Mcmullen is a 78year old female  menopausal status post robotic supracervical hysterectomy BSO sacrocolpopexy on 2012 for pelvic floor relaxation who has a history of recurrent pelvic floor laxation who presents today for a follow-up of her cystocele.  The patient has been using previously #5 diaphragm pessary which had been switched to a #6 diaphragm pessary for relief of her recurrent cystocele.  The patient is at a point where she is now considering surgical therapy.  I reviewed the notes from Dr. Jonathan Carreno from [facility] regarding treatment options and current findings.  I had a lengthy discussion with the patient and her  regarding these notes.  The patient and her  are traveling to [place] after Son and would like to perhaps consider surgical intervention with a return in spring.  I reviewed her past history of UTIs.  At present she is symptom-free.  See previous office visit notes for further details    Past Medical History:   Diagnosis Date    Adenomatous polyp of colon 2011    needs conolonospy 2016    Arthritis     Hypothyroidism     Abstracted 02    Osteopenia      Current Outpatient Medications   Medication    Biotin 5000 MCG CAPS    CALCIUM + D 600-200 MG-IU OR TABS    calcium polycarbophil (FIBERCON) 625 MG tablet    COMPOUNDED NON-CONTROLLED SUBSTANCE (CMPD RX) - PHARMACY TO MIX COMPOUNDED MEDICATION    CRANBERRY-VITAMIN C-D MANNOSE PO    GLUCOSAMINE CHONDROITIN COMPLX PO    levothyroxine (SYNTHROID/LEVOTHROID) 75 MCG tablet    MULTI-DAY OR TABS     No current facility-administered medications for this visit.     /60   LMP  (LMP Unknown)   Constitutional: healthy, alert, and mild distress  Genitourinary: Normal external genitalia without lesions and speculum exam reveals mildly atrophic-appearing vaginal epithelium.  There is descent of the cervical stump to the vaginal introitus.  There is a midline cystocele that protrudes 4 cm  beyond the introitus with left and right apical paravaginal defects.  The paravaginal defect is more prominent on the left side.  Bimanual exam the uterus is absent the cervical stump is unremarkable adnexa without masses enlargement or tenderness.  Rectovaginal exam reveals normal sphincter tone with acceptable posterior compartment support.  There is palpable mesh attachment to the posterior apical vaginal wall    (N81.11) Cystocele, midline  (primary encounter diagnosis)  Comment: I reviewed with the patient and her  the pathophysiology of the disease process and the risks benefits and alternative forms of therapy.  We discussed an expectant wait-and-see approach, the use of an alternative pessary in the interval and surgical repair options including robot-assisted mesh removal with revised sacrocolpopexy versus attempted reattachment of the pelvic floor mesh versus colpocleisis.  I do not think that an anterior repair will provide a durable long-term solution  Plan: A detailed written outline was given.  The patient will come in the office tomorrow to consider placement of an alternative pessary that she can use in the interval.  Alternative sources of consultation were also reviewed    25 minutes spent greater than 50% in counseling chart review examination plan formulation and documentation

## 2023-12-05 NOTE — NURSING NOTE
"Chief Complaint   Patient presents with    Pessary Check/Fit/Insert       Initial /74   LMP  (LMP Unknown)  Estimated body mass index is 29.83 kg/m  as calculated from the following:    Height as of 23: 1.664 m (5' 5.5\").    Weight as of 23: 82.6 kg (182 lb).  BP completed using cuff size: regular    Questioned patient about current smoking habits.  Pt. has never smoked.          The following HM Due: NONE      The following patient reported/Care Every where data was sent to:  P ABSTRACT QUALITY INITIATIVES [[id number]]        Yolande Lynn, [facility]                "

## 2023-12-05 NOTE — PATIENT INSTRUCTIONS
You can reach your [facility] Care Team any time of the day by calling [phone]. This number will put you in touch with the 24 hour nurse line if the clinic is closed.    To contact your OB/GYN Station Coordinator/Surgery Scheduler please call [phone]. This is a direct number for your care team between 8 a.m. and 4 p.m. Monday through Friday.    [facility] is open for your convenience:  Monday through Friday 8 a.m. to 6 p.m.  Closed weekends and all major holidays.

## 2023-12-06 NOTE — PROGRESS NOTES
Kierra Mcmullen is a 78year old female  menopausal status post robotic supracervical hysterectomy BSO sacrocolpopexy on 2012 for pelvic floor relaxation who has a history of recurrent pelvic floor laxation who presents today for a pessary fitting for treatment of her cystocele.  See the extensive notes of  for further details.  After reviewing with the patient and her  the findings her past history and discussing the risks benefits and alternative forms of therapy a decision was made to proceed with a pessary fitting.  Risks, benefits, and alternative modes of therapy discussed at length. Pathophysiology of the disease process reviewed, all of the patients questions answered and informed consent obtained.  We discussed that  The ideal situation would be to have her place the pessary in the morning and remove it at night.  We discussed that with a Gellhorn pessary that sometimes can be challenging    Past Medical History:   Diagnosis Date    Adenomatous polyp of colon 2011    needs conolonospy 2016    Arthritis     Hypothyroidism     Abstracted 02    Osteopenia      Current Outpatient Medications   Medication    Biotin 5000 MCG CAPS    CALCIUM + D 600-200 MG-IU OR TABS    calcium polycarbophil (FIBERCON) 625 MG tablet    COMPOUNDED NON-CONTROLLED SUBSTANCE (CMPD RX) - PHARMACY TO MIX COMPOUNDED MEDICATION    CRANBERRY-VITAMIN C-D MANNOSE PO    GLUCOSAMINE CHONDROITIN COMPLX PO    levothyroxine (SYNTHROID/LEVOTHROID) 75 MCG tablet    MULTI-DAY OR TABS    nitroFURantoin macrocrystal-monohydrate (MACROBID) 100 MG capsule    Oxyquinolone sulfate (TRIMO-YU) 0.025 % GEL     No current facility-administered medications for this visit.     /74   LMP  (LMP Unknown)     Constitutional: healthy, alert, and no distress  Genitourinary: Normal external genitalia without lesions and speculum exam reveals mildly atrophic-appearing vaginal epithelium.  There is descent of the cervical  stump to the vaginal introitus.  There is a midline cystocele that protrudes 4 cm beyond the introitus with left and right apical paravaginal defects.  The paravaginal defect is more prominent on the left side.  Bimanual exam the uterus is absent the cervical stump is unremarkable adnexa without masses enlargement or tenderness.  Rectovaginal exam reveals normal sphincter tone with acceptable posterior compartment support.  There is palpable mesh attachment to the posterior apical vaginal wall   The patient was fitted with a 2 and three-quarter inch Gellhorn pessary dressed with Trimo-Carr gel which resulted in good pelvic floor support.  A loop of bowel plus was placed in the pessary to aid with removal  I reviewed with the patient and her  insertion and removal    (R30.0) Dysuria  (primary encounter diagnosis)  Comment: I gave the patient a prescription for urinalysis and urine culture that she can take with her when she goes to [place].  If the patient has symptoms she can take that to a care provider have the labs drawn with results being faxed back to us.  I did also give her prescription for Macrobid 100 mg p.o. twice daily for 7 days in the event that she does have an infection or infectious symptoms.  She will have a urinalysis done prior to beginning the antibiotic therapy  Plan: nitroFURantoin macrocrystal-monohydrate         (MACROBID) 100 MG capsule, Oxyquinolone sulfate        (TRIMO-CARR) 0.025 % GEL        A detailed written plan was given    (N81.11) Cystocele, midline  Comment: As above  Plan: FIT/INSERT INTRAVAG SUPPORT DEVICE/PESSARY,         PESSARY, NON RUBBER,ANY TYPE        I like to see the patient back in approximately 48 hours to reassess with appropriate therapy to follow.  Written plan was given of our discussion

## 2023-12-07 ENCOUNTER — OFFICE VISIT (OUTPATIENT)
Dept: OBGYN | Facility: CLINIC | Age: 78
End: 2023-12-07
Payer: COMMERCIAL

## 2023-12-07 VITALS — DIASTOLIC BLOOD PRESSURE: 72 MMHG | SYSTOLIC BLOOD PRESSURE: 120 MMHG

## 2023-12-07 DIAGNOSIS — N81.11 CYSTOCELE, MIDLINE: Primary | ICD-10-CM

## 2023-12-07 PROCEDURE — 99213 OFFICE O/P EST LOW 20 MIN: CPT | Performed by: OBSTETRICS & GYNECOLOGY

## 2023-12-07 NOTE — PATIENT INSTRUCTIONS
You can reach your [place] Care Team any time of the day by calling [phone]. This number will put you in touch with the 24 hour nurse line if the clinic is closed.    To contact your OB/GYN Station Coordinator/Surgery Scheduler please call [phone]. This is a direct number for your care team between 8 a.m. and 4 p.m. Monday through Friday.    [facility] is open for your convenience:  Monday through Friday 8 a.m. to 6 p.m.  Closed weekends and all major holidays.

## 2023-12-07 NOTE — PROGRESS NOTES
Kierra Mcmullen is a 78year old female  menopausal status post robotic supracervical hysterectomy BSO sacrocolpopexy on 2012 for pelvic floor relaxation who has a history of recurrent pelvic floor laxation who presents today for a pessary fitting for treatment of her cystocele.  See the extensive notes of  and 2023 for further details.  After reviewing with the patient and her  the findings her past history and discussing the risks benefits and alternative forms of therapy a decision was made to proceed with a pessary fitting.  The patient was fitted with a 2 and three-quarter inch Gellhorn pessary and presents today for follow-up.  The patient's  is able to remove it.  The patient states that she also will learn how to insert and remove it.  The patient states that she is feeling much better with the pessary in place.  She has normal bowel and bladder function and is continent of urine    Past Medical History:   Diagnosis Date    Adenomatous polyp of colon 2011    needs conolonospy 2016    Arthritis 2005    Hypothyroidism     Abstracted 02    Osteopenia      Current Outpatient Medications   Medication    Biotin 5000 MCG CAPS    CALCIUM + D 600-200 MG-IU OR TABS    calcium polycarbophil (FIBERCON) 625 MG tablet    COMPOUNDED NON-CONTROLLED SUBSTANCE (CMPD RX) - PHARMACY TO MIX COMPOUNDED MEDICATION    CRANBERRY-VITAMIN C-D MANNOSE PO    GLUCOSAMINE CHONDROITIN COMPLX PO    levothyroxine (SYNTHROID/LEVOTHROID) 75 MCG tablet    MULTI-DAY OR TABS    nitroFURantoin macrocrystal-monohydrate (MACROBID) 100 MG capsule    Oxyquinolone sulfate (TRIMO-YU) 0.025 % GEL     No current facility-administered medications for this visit.     /72   LMP  (LMP Unknown)   Constitutional: healthy, alert, and no distress  Genitourinary: Normal external genitalia without lesions and the 2 and three-quarter inch Gellhorn pessary was removed and cleaned with soap and water.  The  dental floss string was readjusted to aid with removal.  Speculum exam reveals the previously noted midline cystocele with bilateral apical paravaginal defects to be unchanged.  The cervical stump distends to the introitus.  Bimanual exam uterus is absent adnexa without masses enlargement or tenderness.  Posterior compartment has acceptable good level of support.  A 2 and three-quarter inch Gellhorn pessary was redressed with Trimo-Carr and the patient inserted it without difficulty    (N81.11) Cystocele, midline  (primary encounter diagnosis)  Comment: The Gellhorn pessary is working well for her  Plan: I discussed removing it every several days and cleaning it with soap and water at bedtime and then replace it in the morning.  The patient maravilla in [place] and will call if she has concerns in the interval.  We discussed getting a second opinion with the urogynecologist at [facility].  The patient will let us know if she still wants to proceed with that when she returns in the spring

## 2023-12-07 NOTE — NURSING NOTE
"Chief Complaint   Patient presents with    Pessary Check/Fit/Insert       Initial /72   LMP  (LMP Unknown)  Estimated body mass index is 29.83 kg/m  as calculated from the following:    Height as of 23: 1.664 m (5' 5.5\").    Weight as of 23: 82.6 kg (182 lb).  BP completed using cuff size: regular    Questioned patient about current smoking habits.  Pt. has never smoked.          The following HM Due: NONE      The following patient reported/Care Every where data was sent to:  P ABSTRACT QUALITY INITIATIVES [[id number]]        Yolande Lynn, [facility]                 "

## 2023-12-19 ENCOUNTER — TELEPHONE (OUTPATIENT)
Dept: OBGYN | Facility: CLINIC | Age: 78
End: 2023-12-19
Payer: COMMERCIAL

## 2023-12-19 DIAGNOSIS — N32.81 URGENCY-FREQUENCY SYNDROME: Primary | ICD-10-CM

## 2023-12-19 RX ORDER — SOLIFENACIN SUCCINATE 10 MG/1
10 TABLET, FILM COATED ORAL DAILY
Qty: 90 TABLET | Refills: 0 | Status: SHIPPED | OUTPATIENT
Start: 2023-12-19 | End: 2024-02-13

## 2023-12-19 NOTE — TELEPHONE ENCOUNTER
I spoke to the patient and her  today regarding urgency frequency symptoms and the use of the pessary.  The patient states that the pessary can shift periodically particularly after bowel movements and her  helps reposition at and when he does that it works much better.  We discussed the etiology of urgency frequency we discussed fluid management elimination of irritants and bladder infections as per the causes.  I reviewed her most recent urine culture from September 2023 that was normal.  At this point the patient would like to begin a trial of Vesicare 10 mg daily.  I sent a 90-day prescription into [facility] for her.  She will try this and let us know how it works with appropriate therapy based on the results

## 2024-01-02 ENCOUNTER — TELEPHONE (OUTPATIENT)
Dept: OBGYN | Facility: CLINIC | Age: 79
End: 2024-01-02
Payer: COMMERCIAL

## 2024-01-02 NOTE — TELEPHONE ENCOUNTER
Phone call to patient to help ehr get scheduled for her procedure with . Patient asked me to cancel her appointment for May with him and she didn't want to move forward with scheduling surgery    Patient has our clinic number if she needs anything from us    Marion QUINTANILLA

## 2024-01-17 ENCOUNTER — TELEPHONE (OUTPATIENT)
Dept: OBGYN | Facility: CLINIC | Age: 79
End: 2024-01-17
Payer: COMMERCIAL

## 2024-01-17 NOTE — TELEPHONE ENCOUNTER
Call from Dr Austin's pt who is now referred to you with bleeding that seems a little more than usual coming from her bladder. She has two pessaries (ring and gel horn) and wondering if she can still use with the bleeding. Pt is in [place] until April. Please advise.    Last OV: 12/7/23    ERI Long

## 2024-01-17 NOTE — TELEPHONE ENCOUNTER
Dr Ma I called and have this pt scheduled for surgery on 10-3 bilateral urs with laser. This is follow up from the hospital stay. Pt is on plavix. I called pcp for instruction to hold plavix 7 days prior to surgery? Dr. Cande Bang said he has wanted the pt to see cardiology and has not. He needs to see cardiology for clearance. So I will have to help the pt be seen by cardiology I assume?   Lisa    Franca clementsg sent to pt to let her know that Dr Gustafson doesn't do pessaries and her problem was routed to all of the providers that do.     ERI Long

## 2024-01-18 NOTE — TELEPHONE ENCOUNTER
If she is having bleeding from the bladder she needs to be seen, and it will be impossible to advise what to do via this method.    Maribel Arguello MD  [facility] Obstetrics and Gynecology

## 2024-01-18 NOTE — TELEPHONE ENCOUNTER
Call to pt, let her know that she needs to be evaluated. Pt will look for a gyn since she is in [place].    ERI Long

## 2024-01-19 ENCOUNTER — TELEPHONE (OUTPATIENT)
Dept: OBGYN | Facility: CLINIC | Age: 79
End: 2024-01-19
Payer: COMMERCIAL

## 2024-01-19 ENCOUNTER — TELEPHONE (OUTPATIENT)
Dept: OBGYN | Facility: CLINIC | Age: 79
End: 2024-01-19

## 2024-01-19 NOTE — TELEPHONE ENCOUNTER
I spoke with the pt and she is having concerns with her 2.75 inch gelhorn pessary moving and causing discomfort.  I discussed treatment options and have opted to try a different size pessary,   I will send an Rx for a 2.5 inch and a 3 ich gelhorn pessary to her listed [facility] in [place] at her request

## 2024-02-13 ENCOUNTER — MYC REFILL (OUTPATIENT)
Dept: OBGYN | Facility: CLINIC | Age: 79
End: 2024-02-13
Payer: COMMERCIAL

## 2024-02-13 DIAGNOSIS — N32.81 URGENCY-FREQUENCY SYNDROME: ICD-10-CM

## 2024-02-13 RX ORDER — SOLIFENACIN SUCCINATE 10 MG/1
10 TABLET, FILM COATED ORAL DAILY
Qty: 90 TABLET | Refills: 1 | Status: SHIPPED | OUTPATIENT
Start: 2024-02-13 | End: 2024-04-30

## 2024-02-13 NOTE — TELEPHONE ENCOUNTER
Requested Prescriptions   Pending Prescriptions Disp Refills    solifenacin (VESICARE) 10 MG tablet 90 tablet 0     Sig: Take 1 tablet (10 mg) by mouth daily       Muscarinic Antagonists (Urinary Incontinence Agents) Passed - 2/13/2024  8:01 AM        Passed - Recent (12 mo) or future (30 days) visit within the authorizing provider's specialty     The patient must have completed an in-person or virtual visit within the past 12 months or has a future visit scheduled within the next 90 days with the authorizing provider s specialty.  Urgent care and e-visits do not quality as an office visit for this protocol.          Passed - Patient does not have a diagnosis of glaucoma on the problem list     If glaucoma diagnosis is new, refer refill to physician.          Passed - Medication is active on med list        Passed - Patient is 18 years of age or older           Filled.    Kiki MONTIEL RN BSN

## 2024-04-29 SDOH — HEALTH STABILITY: PHYSICAL HEALTH: ON AVERAGE, HOW MANY DAYS PER WEEK DO YOU ENGAGE IN MODERATE TO STRENUOUS EXERCISE (LIKE A BRISK WALK)?: 5 DAYS

## 2024-04-29 SDOH — HEALTH STABILITY: PHYSICAL HEALTH: ON AVERAGE, HOW MANY MINUTES DO YOU ENGAGE IN EXERCISE AT THIS LEVEL?: 40 MIN

## 2024-04-29 ASSESSMENT — SOCIAL DETERMINANTS OF HEALTH (SDOH): HOW OFTEN DO YOU GET TOGETHER WITH FRIENDS OR RELATIVES?: THREE TIMES A WEEK

## 2024-04-30 ENCOUNTER — OFFICE VISIT (OUTPATIENT)
Dept: INTERNAL MEDICINE | Facility: CLINIC | Age: 79
End: 2024-04-30
Attending: INTERNAL MEDICINE
Payer: COMMERCIAL

## 2024-04-30 VITALS
OXYGEN SATURATION: 96 % | DIASTOLIC BLOOD PRESSURE: 64 MMHG | HEART RATE: 83 BPM | RESPIRATION RATE: 13 BRPM | TEMPERATURE: 97.1 F | SYSTOLIC BLOOD PRESSURE: 109 MMHG | HEIGHT: 66 IN | BODY MASS INDEX: 28.88 KG/M2 | WEIGHT: 179.7 LBS

## 2024-04-30 DIAGNOSIS — N32.81 URGENCY-FREQUENCY SYNDROME: ICD-10-CM

## 2024-04-30 DIAGNOSIS — E07.9 THYROID DYSFUNCTION: ICD-10-CM

## 2024-04-30 DIAGNOSIS — Z00.00 ENCOUNTER FOR PREVENTATIVE ADULT HEALTH CARE EXAMINATION: Primary | ICD-10-CM

## 2024-04-30 DIAGNOSIS — E03.9 ACQUIRED HYPOTHYROIDISM: ICD-10-CM

## 2024-04-30 DIAGNOSIS — N39.0 RECURRENT UTI: ICD-10-CM

## 2024-04-30 LAB
ALBUMIN UR-MCNC: NEGATIVE MG/DL
APPEARANCE UR: CLEAR
BACTERIA #/AREA URNS HPF: ABNORMAL /HPF
BILIRUB UR QL STRIP: NEGATIVE
COLOR UR AUTO: YELLOW
ERYTHROCYTE [DISTWIDTH] IN BLOOD BY AUTOMATED COUNT: 13.3 % (ref 10–15)
GLUCOSE UR STRIP-MCNC: NEGATIVE MG/DL
HCT VFR BLD AUTO: 45.3 % (ref 35–47)
HGB BLD-MCNC: 14.9 G/DL (ref 11.7–15.7)
HGB UR QL STRIP: ABNORMAL
KETONES UR STRIP-MCNC: NEGATIVE MG/DL
LEUKOCYTE ESTERASE UR QL STRIP: ABNORMAL
MCH RBC QN AUTO: 28.7 PG (ref 26.5–33)
MCHC RBC AUTO-ENTMCNC: 32.9 G/DL (ref 31.5–36.5)
MCV RBC AUTO: 87 FL (ref 78–100)
NITRATE UR QL: NEGATIVE
PH UR STRIP: 6.5 [PH] (ref 5–7)
PLATELET # BLD AUTO: 299 10E3/UL (ref 150–450)
RBC # BLD AUTO: 5.2 10E6/UL (ref 3.8–5.2)
RBC #/AREA URNS AUTO: ABNORMAL /HPF
SP GR UR STRIP: 1.01 (ref 1–1.03)
SQUAMOUS #/AREA URNS AUTO: ABNORMAL /LPF
UROBILINOGEN UR STRIP-ACNC: 0.2 E.U./DL
WBC # BLD AUTO: 5.4 10E3/UL (ref 4–11)
WBC #/AREA URNS AUTO: ABNORMAL /HPF

## 2024-04-30 PROCEDURE — 80053 COMPREHEN METABOLIC PANEL: CPT | Performed by: INTERNAL MEDICINE

## 2024-04-30 PROCEDURE — 85027 COMPLETE CBC AUTOMATED: CPT | Performed by: INTERNAL MEDICINE

## 2024-04-30 PROCEDURE — 80061 LIPID PANEL: CPT | Performed by: INTERNAL MEDICINE

## 2024-04-30 PROCEDURE — G0439 PPPS, SUBSEQ VISIT: HCPCS | Performed by: INTERNAL MEDICINE

## 2024-04-30 PROCEDURE — 99214 OFFICE O/P EST MOD 30 MIN: CPT | Mod: 25 | Performed by: INTERNAL MEDICINE

## 2024-04-30 PROCEDURE — 36415 COLL VENOUS BLD VENIPUNCTURE: CPT | Performed by: INTERNAL MEDICINE

## 2024-04-30 PROCEDURE — 87086 URINE CULTURE/COLONY COUNT: CPT | Performed by: INTERNAL MEDICINE

## 2024-04-30 PROCEDURE — 84443 ASSAY THYROID STIM HORMONE: CPT | Performed by: INTERNAL MEDICINE

## 2024-04-30 PROCEDURE — 81001 URINALYSIS AUTO W/SCOPE: CPT | Performed by: INTERNAL MEDICINE

## 2024-04-30 RX ORDER — SOLIFENACIN SUCCINATE 10 MG/1
10 TABLET, FILM COATED ORAL DAILY
Qty: 90 TABLET | Refills: 3 | Status: SHIPPED | OUTPATIENT
Start: 2024-04-30 | End: 2024-09-11

## 2024-04-30 RX ORDER — LEVOTHYROXINE SODIUM 75 UG/1
TABLET ORAL
Qty: 90 TABLET | Refills: 3 | Status: SHIPPED | OUTPATIENT
Start: 2024-04-30

## 2024-04-30 NOTE — PROGRESS NOTES
SUBJECTIVE:     I spent a total of 25 minutes on the care of Kierra today's service including 20 minutes of face-to-face time with remainder in chart review, care coordination, documentation                                                  Kierra Mcmullen is a 79 year old female who presents to clinic today for the following health issue(s):  pessary check No chief complaint on file.    Shamar is a 79-year-old P2 who presents for evaluation and discussion regarding pessaries.     It should be noted that Kierra is  having surgery in fall for pelvic relaxation.  She is already had what sounds like a colpopexy and mesh placement.  She is coming to us having seen Dr. Oleksandr Buckley and has scheduled surgery for approximately 4 months from now.    She is accompanied by her  Sage who assists her in the placement and removal of the Gellhorn pessaries.  She is currently on a size 3 pessary.  She notes that it has worked better than the rings but is difficult to remove.   typically inserts that and removes it for and that seems to have worked well for her.  She presents also with a complete bag of a variety of different pessaries that they have picked up over the years from from online sources.  She is notes that she has had a long history with Dr. Austin trying different pessaries and at this point the past pessary has worked the best is a Gellhorn.  However on 1 occasion it worked well for 6 weeks and running and on another occasion it hurts very shortly after insertion.    We talked about the specifics of pessaries and how they work and how they are inserted and it sounds like significant other is adept at placing these things however there is the conception that the pessary is needs to be properly placed and will maintain its position.  In the and discussed how pessaries are quite mobile and that placement is is always get a shift and sometimes is possibly has shifted in a direction that is quite  uncomfortable.  1 option for now is that she continue on with this Gellhorn and have it removed in 3 weeks to clean and evaluate vaginal walls.  The second consideration is that should it start hurting to apply once fingers to the vaginal walls and shift the position of where the pessary is.     She is concerned that potentially's pessary of the size could affect bowels.  I suggest that this is probably not good to have a significant impact on constipation.  Also we discussed how they can be removed and however they are fairly difficult to remove but should be removed and potentially replaced on a weekly basis.  I think at this point as they have used these before that we should see her back in 6 weeks.    We also talked about sizes of Gellhorn's because the rings it sounds like when she is using a ring the bladder is able to be surrounded by the ring and actually present itself beyond the introitus.  She is not really comfortable and in while I do not believe this is something that is typically done suggest that another alternative in the short term is to try to rings at once and see if that works for her.  Is there and this adept at inserting and removing pessaries that could be an option to consider.    Examination is deferred    Assessment:  Prominent cystocele with history of multiple pessary attempts  Plan:  Suggestion in the short-term is to continue on with the Gellhorn and see if manipulating this would not minimize discomfort should discomfort arise.  Follow-up in 3 weeks      No LMP recorded (lmp unknown). Patient has had a hysterectomy..       Today's PHQ-2 Score:       4/29/2024     1:20 PM   PHQ-2 ( 1999 [employer])   Q1: Little interest or pleasure in doing things 0   Q2: Feeling down, depressed or hopeless 0   PHQ-2 Score 0   Q1: Little interest or pleasure in doing things Not at all   Q2: Feeling down, depressed or hopeless Not at all   PHQ-2 Score 0     Today's PHQ-9 Score:        No data to display               Today's GWEN-7 Score:        No data to display                Problem list and histories reviewed & adjusted, as indicated.  Additional history: as documented.    Patient Active Problem List   Diagnosis    Obesity    Prolapse of vaginal wall    Recurrent UTI    Advance Care Planning    Postmenopausal bleeding    Cystocele, midline    Enterocele    Hypothyroidism    Osteopenia    Adenomatous polyp of colon    Degenerative arthritis of knee    Pyuria, sterile    Greater trochanteric bursitis of left hip    History of total knee arthroplasty, left     Past Surgical History:   Procedure Laterality Date    ARTHROPLASTY KNEE Left 2015    Procedure: ARTHROPLASTY KNEE;  Surgeon: Daniel Mack MD;  Location: [facility] OR    CHOLECYSTECTOMY      COLONOSCOPY N/A 2016    Procedure: COLONOSCOPY;  Surgeon: Sage Berg MD;  Location:  GI    CYSTOSCOPY  2012    Procedure:CYSTOSCOPY; Surgeon:GAMA GRIDER; Location:[facility] OR    DAVINCI HYSTERECTOMY SUPRACERVICAL, SACROCOLPOPEXY, COMBINED  2012    Procedure:COMBINED DAVINCI HYSTERECTOMY SUPRACERVICAL, SACROCOLPOPEXY; DAVINCI LAPAROSCOPIC SUPRACERVICAL HYSTERECTOMY, BILATERAL SALPINGO-OOPHORECTOMY, SACROCOLPOPEXY  WITH COLOPLASTY MESH AND CYSTOPLASTY; Surgeon:GAMA GRIDER; Location: OR    HC REMOVAL OF TONSILS,<13 Y/O      Tonsils <12y.o.    HC TOOTH EXTRACTION W/FORCEP      ZZC NONSPECIFIC PROCEDURE      Cholecystectomy -- Abstracted 02    [facility] COLONOSCOPY THRU STOMA, DIAGNOSTIC        Social History     Tobacco Use    Smoking status: Former     Current packs/day: 0.00     Average packs/day: 1 pack/day for 25.0 years (25.0 ttl pk-yrs)     Types: Cigarettes     Start date: 1962     Quit date: 1987     Years since quittin.3     Passive exposure: Past    Smokeless tobacco: Never    Tobacco comments:        Substance Use Topics    Alcohol use: Yes     Alcohol/week: 10.0 standard drinks of alcohol     Comment: 1-2  glasses of wine/week      Problem (# of Occurrences) Relation (Name,Age of Onset)    Alzheimer Disease (1) Father    Arthritis (1) Mother (Rossana Milner): RA    Cancer (1) Mother (Rossana Milner):  non-Hodgkins lymphoma    Cardiovascular (1) Maternal Aunt: MI    Gastrointestinal Disease (2) Mother (Rossana Milner): UC, Sister (Ca Russell): diverticulitis    Osteoporosis (2) Mother (Rossana Milner), Sister (Ca Russell)    Thyroid Disease (1) Mother (Rossana Milner)    Breast Cancer (1) Paternal Aunt    Bartter's syndrome (1) Sister (Ca Russell)    Other Cancer (1) Mother (Rossana Milner): Non-Hodgkins Lymphoma              Current Outpatient Medications   Medication Sig Dispense Refill    Biotin 5000 MCG CAPS       CALCIUM + D 600-200 MG-IU OR TABS 1 TABLET DAILY 30 0    calcium polycarbophil (FIBERCON) 625 MG tablet Take 2 tablets by mouth daily      COMPOUNDED NON-CONTROLLED SUBSTANCE (CMPD RX) - PHARMACY TO MIX COMPOUNDED MEDICATION Estradiol 0.02% in HRT heavy (cream base)  Place in tube and include vaginal applicator Si/2 gm intravaginally at HS on Mon and Th pm as directed 42 g 3    CRANBERRY-VITAMIN C-D MANNOSE PO       D-Mannose 500 MG CAPS Take 500 mg by mouth      GLUCOSAMINE CHONDROITIN COMPLX PO Take 1 tablet by mouth daily 750/600      levothyroxine (SYNTHROID/LEVOTHROID) 75 MCG tablet Take Levothyroxine 75 mcg PO daily 90 tablet 3    MULTI-DAY OR TABS one PO QD      Oxyquinolone sulfate (TRIMO-YU) 0.025 % GEL Place 1 Dose vaginally daily as needed (Patient not taking: Reported on 2024) 113 g 3    solifenacin (VESICARE) 10 MG tablet Take 1 tablet (10 mg) by mouth daily 90 tablet 3     No current facility-administered medications for this visit.     No Known Allergies        OBJECTIVE:     LMP  (LMP Unknown)   There is no height or weight on file to calculate BMI.    Exam:  Deferred    In-Clinic Test Results:  Results for orders placed or performed in visit on 24 (from the past 24  hour(s))   CBC with platelets   Result Value Ref Range    WBC Count 5.4 4.0 - 11.0 10e3/uL    RBC Count 5.20 3.80 - 5.20 10e6/uL    Hemoglobin 14.9 11.7 - 15.7 g/dL    Hematocrit 45.3 35.0 - 47.0 %    MCV 87 78 - 100 fL    MCH 28.7 26.5 - 33.0 pg    MCHC 32.9 31.5 - 36.5 g/dL    RDW 13.3 10.0 - 15.0 %    Platelet Count 299 150 - 450 10e3/uL   UA with Microscopic reflex to Culture - lab collect    Specimen: Urine, Midstream   Result Value Ref Range    Color Urine Yellow Colorless, Straw, Light Yellow, Yellow    Appearance Urine Clear Clear    Glucose Urine Negative Negative mg/dL    Bilirubin Urine Negative Negative    Ketones Urine Negative Negative mg/dL    Specific Gravity Urine 1.015 1.003 - 1.035    Blood Urine Trace (A) Negative    pH Urine 6.5 5.0 - 7.0    Protein Albumin Urine Negative Negative mg/dL    Urobilinogen Urine 0.2 0.2, 1.0 E.U./dL    Nitrite Urine Negative Negative    Leukocyte Esterase Urine Moderate (A) Negative   UA Microscopic with Reflex to Culture   Result Value Ref Range    Bacteria Urine Moderate (A) None Seen /HPF    RBC Urine 2-5 (A) 0-2 /HPF /HPF    WBC Urine 0-5 0-5 /HPF /HPF    Squamous Epithelials Urine Many (A) None Seen /LPF       ASSESSMENT/PLAN:                                                      See above    Nitish Fowler MD  [facility]

## 2024-04-30 NOTE — LETTER
May 3, 2024      Kierra ZAYAS Benedict  [address] [place] [address]        Dear ,    We are writing to inform you of your test results.    Borderline decreased kidney function. Try to maintain hydration, avoid NSAID use.   UA showed possible infection, but the culture is negative.   Keep hydrated, recheck UA and BMP in 1 month.   Rest of the results are normal.     Resulted Orders   Lipid panel reflex to direct LDL Fasting   Result Value Ref Range    Cholesterol 176 <200 mg/dL    Triglycerides 85 <150 mg/dL    Direct Measure HDL 67 >=50 mg/dL    LDL Cholesterol Calculated 92 <=100 mg/dL    Non HDL Cholesterol 109 <130 mg/dL    Patient Fasting > 8hrs? Yes     Narrative    Cholesterol  Desirable:  <200 mg/dL    Triglycerides  Normal:  Less than 150 mg/dL  Borderline High:  150-199 mg/dL  High:  200-499 mg/dL  Very High:  Greater than or equal to 500 mg/dL    Direct Measure HDL  Female:  Greater than or equal to 50 mg/dL   Male:  Greater than or equal to 40 mg/dL    LDL Cholesterol  Desirable:  <100mg/dL  Above Desirable:  100-129 mg/dL   Borderline High:  130-159 mg/dL   High:  160-189 mg/dL   Very High:  >= 190 mg/dL    Non HDL Cholesterol  Desirable:  130 mg/dL  Above Desirable:  130-159 mg/dL  Borderline High:  160-189 mg/dL  High:  190-219 mg/dL  Very High:  Greater than or equal to 220 mg/dL   TSH with free T4 reflex   Result Value Ref Range    TSH 1.57 0.30 - 4.20 uIU/mL   UA with Microscopic reflex to Culture - lab collect   Result Value Ref Range    Color Urine Yellow Colorless, Straw, Light Yellow, Yellow    Appearance Urine Clear Clear    Glucose Urine Negative Negative mg/dL    Bilirubin Urine Negative Negative    Ketones Urine Negative Negative mg/dL    Specific Gravity Urine 1.015 1.003 - 1.035    Blood Urine Trace (A) Negative    pH Urine 6.5 5.0 - 7.0    Protein Albumin Urine Negative Negative mg/dL    Urobilinogen Urine 0.2 0.2, 1.0 E.U./dL    Nitrite Urine Negative Negative     Leukocyte Esterase Urine Moderate (A) Negative   Comprehensive metabolic panel (BMP + Alb, Alk Phos, ALT, AST, Total. Bili, TP)   Result Value Ref Range    Sodium 137 135 - 145 mmol/L      Comment:      Reference intervals for this test were updated on 09/26/2023 to more accurately reflect our healthy population. There may be differences in the flagging of prior results with similar values performed with this method. Interpretation of those prior results can be made in the context of the updated reference intervals.     Potassium 4.2 3.4 - 5.3 mmol/L    Carbon Dioxide (CO2) 27 22 - 29 mmol/L    Anion Gap 9 7 - 15 mmol/L    Urea Nitrogen 19.2 8.0 - 23.0 mg/dL    Creatinine 1.04 (H) 0.51 - 0.95 mg/dL    GFR Estimate 54 (L) >60 mL/min/1.73m2    Calcium 9.7 8.8 - 10.2 mg/dL    Chloride 101 98 - 107 mmol/L    Glucose 96 70 - 99 mg/dL    Alkaline Phosphatase 64 40 - 150 U/L      Comment:      Reference intervals for this test were updated on 11/14/2023 to more accurately reflect our healthy population. There may be differences in the flagging of prior results with similar values performed with this method. Interpretation of those prior results can be made in the context of the updated reference intervals.    AST 24 0 - 45 U/L      Comment:      Reference intervals for this test were updated on 6/12/2023 to more accurately reflect our healthy population. There may be differences in the flagging of prior results with similar values performed with this method. Interpretation of those prior results can be made in the context of the updated reference intervals.    ALT 18 0 - 50 U/L      Comment:      Reference intervals for this test were updated on 6/12/2023 to more accurately reflect our healthy population. There may be differences in the flagging of prior results with similar values performed with this method. Interpretation of those prior results can be made in the context of the updated reference intervals.      Protein  Total 7.7 6.4 - 8.3 g/dL    Albumin 4.2 3.5 - 5.2 g/dL    Bilirubin Total 0.5 <=1.2 mg/dL   CBC with platelets   Result Value Ref Range    WBC Count 5.4 4.0 - 11.0 10e3/uL    RBC Count 5.20 3.80 - 5.20 10e6/uL    Hemoglobin 14.9 11.7 - 15.7 g/dL    Hematocrit 45.3 35.0 - 47.0 %    MCV 87 78 - 100 fL    MCH 28.7 26.5 - 33.0 pg    MCHC 32.9 31.5 - 36.5 g/dL    RDW 13.3 10.0 - 15.0 %    Platelet Count 299 150 - 450 10e3/uL   UA Microscopic with Reflex to Culture   Result Value Ref Range    Bacteria Urine Moderate (A) None Seen /HPF    RBC Urine 2-5 (A) 0-2 /HPF /HPF    WBC Urine 0-5 0-5 /HPF /HPF    Squamous Epithelials Urine Many (A) None Seen /LPF   Urine Culture   Result Value Ref Range    Culture 10,000-50,000 CFU/mL Mixture of Urogenital Gill        If you have any questions or concerns, please call the clinic at the number listed above.       Sincerely,      Matt Whitney MD

## 2024-04-30 NOTE — PROGRESS NOTES
"Preventive Care Visit  [facility]  Matt Whitney MD, Internal Medicine  2024          Assessment & Plan     Acquired hypothyroidism  Assess lab work   Continue treatment   - TSH with free T4 reflex  - OFFICE/OUTPT VISIT,EST,LEVL III    Thyroid dysfunction    - levothyroxine (SYNTHROID/LEVOTHROID) 75 MCG tablet; Take Levothyroxine 75 mcg PO daily    Urgency-frequency syndrome  Continue treatment, follow up with [facility] for pelvic floor prolapse   - solifenacin (VESICARE) 10 MG tablet; Take 1 tablet (10 mg) by mouth daily  - OFFICE/OUTPT VISIT,EST,LEVL III    Recurrent UTI  Assess UA, recently finished antibiotic - 5 days ago   - COMPOUNDED NON-CONTROLLED SUBSTANCE (CMPD RX) - PHARMACY TO MIX COMPOUNDED MEDICATION; Estradiol 0.02% in HRT heavy (cream base)  Place in tube and include vaginal applicator Si/2 gm intravaginally at HS on Mon and Thurs pm as directed  - UA with Microscopic reflex to Culture - lab collect  - OFFICE/OUTPT VISIT,EST,LEVL III    Encounter for preventative adult health care examination  advised regular aerobic activity, low cholesterol, low salt diet, wearing seat belt,  self examinations, sunscreen protection.Obtain screening cholesterol, immunizations reviewed.    - Lipid panel reflex to direct LDL Fasting  - TSH with free T4 reflex  - UA with Microscopic reflex to Culture - lab collect  - Comprehensive metabolic panel (BMP + Alb, Alk Phos, ALT, AST, Total. Bili, TP)  - CBC with platelets    Patient has been advised of split billing requirements and indicates understanding: Yes          BMI  Estimated body mass index is 29.45 kg/m  as calculated from the following:    Height as of this encounter: 1.664 m (5' 5.5\").    Weight as of this encounter: 81.5 kg (179 lb 11.2 oz).       Counseling  Appropriate preventive services were discussed with this patient, including applicable screening as appropriate for fall prevention, nutrition, physical " activity, Tobacco-use cessation, weight loss and cognition.  Checklist reviewing preventive services available has been given to the patient.  Reviewed patient's diet, addressing concerns and/or questions.   The patient was provided with written information regarding signs of hearing loss.   Information on urinary incontinence and treatment options given to patient.       See Patient Instructions    Filiberto Lozada is a 79 year old, presenting for the following:  Medicare Visit (Fasting)        4/30/2024     7:55 AM   Additional Questions   Roomed by darlyn   Accompanied by self         4/30/2024     7:55 AM   Patient Reported Additional Medications   Patient reports taking the following new medications none         Health Care Directive  Patient has a Health Care Directive on file  Advance care planning document is on file and is current.    HPI      Has history of hypothyroidism. On replacement treatment with Synthroid. No heat /cold intolerance, heart palpitations, weight loss/ gain ,  change in bowel habits.  Has h/o overactive bladder, pelvic floor prolapse, recurrent UTI. Has been seen in [facility]. Plan for possible surgery. Had UTI, treated, finished antibiotic 5 days ago.               4/29/2024   General Health   How would you rate your overall physical health? Good   Feel stress (tense, anxious, or unable to sleep) Not at all         4/29/2024   Nutrition   Diet: Regular (no restrictions)         4/29/2024   Exercise   Days per week of moderate/strenous exercise 5 days   Average minutes spent exercising at this level 40 min         4/29/2024   Social Factors   Frequency of gathering with friends or relatives Three times a week   Worry food won't last until get money to buy more No   Food not last or not have enough money for food? No   Do you have housing?  Yes   Are you worried about losing your housing? No   Lack of transportation? No   Unable to get utilities (heat,electricity)? No         4/30/2024    Fall Risk   Fallen 2 or more times in the past year? No    No   Trouble with walking or balance? No    No          2024   Activities of Daily Living- Home Safety   Needs help with the following daily activites None of the above   Safety concerns in the home None of the above         2024   Dental   Dentist two times every year? Yes         2024   Hearing Screening   Hearing concerns? (!) I FEEL THAT PEOPLE ARE MUMBLING OR NOT SPEAKING CLEARLY.    (!) I NEED TO ASK PEOPLE TO SPEAK UP OR REPEAT THEMSELVES.    (!) IT'S HARD TO FOLLOW A CONVERSATION IN A NOISY RESTAURANT OR CROWDED ROOM.    (!) TROUBLE UNDERSTANDING SOFT OR WHISPERED SPEECH.    (!) TROUBLE UNDERSTANDING SPEECH ON THE TELEPHONE         2024   Driving Risk Screening   Patient/family members have concerns about driving No         2024   General Alertness/Fatigue Screening   Have you been more tired than usual lately? No         2024   Urinary Incontinence Screening   Bothered by leaking urine in past 6 months Yes         2024   TB Screening   Were you born outside of the [place]? No         Today's PHQ-2 Score:       2024     1:20 PM   PHQ-2 ( 1999 [employer])   Q1: Little interest or pleasure in doing things 0   Q2: Feeling down, depressed or hopeless 0   PHQ-2 Score 0   Q1: Little interest or pleasure in doing things Not at all   Q2: Feeling down, depressed or hopeless Not at all   PHQ-2 Score 0           2024   Substance Use   Alcohol more than 3/day or more than 7/wk No   Do you have a current opioid prescription? No   How severe/bad is pain from 1 to 10? 0/10 (No Pain)   Do you use any other substances recreationally? (!) ALCOHOL     Social History     Tobacco Use    Smoking status: Former     Current packs/day: 0.00     Average packs/day: 1 pack/day for 25.0 years (25.0 ttl pk-yrs)     Types: Cigarettes     Start date: 1962     Quit date: 1987     Years since quittin.3     Passive exposure: Past     Smokeless tobacco: Never    Tobacco comments:     1985   Vaping Use    Vaping status: Never Used   Substance Use Topics    Alcohol use: Yes     Alcohol/week: 10.0 standard drinks of alcohol     Comment: 1-2 glasses of wine/week    Drug use: No           6/16/2023   LAST FHS-7 RESULTS   1st degree relative breast or ovarian cancer No   Any relative bilateral breast cancer No   Any male have breast cancer No   Any ONE woman have BOTH breast AND ovarian cancer No   Any woman with breast cancer before 50yrs No   2 or more relatives with breast AND/OR ovarian cancer No   2 or more relatives with breast AND/OR bowel cancer No            ASCVD Risk   The 10-year ASCVD risk score (Osmany POLK, et al., 2019) is: 22.7%    Values used to calculate the score:      Age: 79 years      Sex: Female      Is Non- : No      Diabetic: No      Tobacco smoker: No      Systolic Blood Pressure: 120 mmHg      Is BP treated: No      HDL Cholesterol: 76 mg/dL      Total Cholesterol: 201 mg/dL            Reviewed and updated as needed this visit by Provider                    Lab work is in process  Labs reviewed in EPIC  Current providers sharing in care for this patient include:  Patient Care Team:  Matt Whitney MD as PCP - General (Internal Medicine)  Matt Whitney MD as Assigned PCP  Jenna Nguyen PA-C as Physician Assistant (Urology)  Xavier Austin MD as Referring Physician (OB/Gyn)  Jonathan Patel MD as MD (OB/Gyn)  Jonathan Patel MD as MD (OB/Gyn)  Xavier Austin MD as Assigned OBGYN Provider    The following health maintenance items are reviewed in Epic and correct as of today:  Health Maintenance   Topic Date Due    COVID-19 Vaccine (8 - 2023-24 season) 02/13/2024    ANNUAL REVIEW OF HM ORDERS  04/18/2024    MEDICARE ANNUAL WELLNESS VISIT  04/18/2024    TSH W/FREE T4 REFLEX  04/18/2024    MAMMO SCREENING  06/16/2024    FALL RISK ASSESSMENT  04/30/2025    COLORECTAL CANCER  "SCREENING  06/01/2026    GLUCOSE  09/20/2026    LIPID  04/18/2028    ADVANCE CARE PLANNING  04/18/2028    DTAP/TDAP/TD IMMUNIZATION (3 - Td or Tdap) 02/28/2033    DEXA  07/28/2038    PHQ-2 (once per calendar year)  Completed    INFLUENZA VACCINE  Completed    Pneumococcal Vaccine: 65+ Years  Completed    ZOSTER IMMUNIZATION  Completed    RSV VACCINE (Pregnancy & 60+)  Completed    HEPATITIS C SCREENING  Addressed    IPV IMMUNIZATION  Aged Out    HPV IMMUNIZATION  Aged Out    MENINGITIS IMMUNIZATION  Aged Out    RSV MONOCLONAL ANTIBODY  Aged Out         Review of Systems  Constitutional, HEENT, cardiovascular, pulmonary, GI, , musculoskeletal, neuro, skin, endocrine and psych systems are negative, except as otherwise noted.     Objective    Exam  Pulse 83   Temp 97.1  F (36.2  C) (Tympanic)   Resp 13   Ht 1.664 m (5' 5.5\")   Wt 81.5 kg (179 lb 11.2 oz)   LMP  (LMP Unknown)   SpO2 96%   BMI 29.45 kg/m     Estimated body mass index is 29.45 kg/m  as calculated from the following:    Height as of this encounter: 1.664 m (5' 5.5\").    Weight as of this encounter: 81.5 kg (179 lb 11.2 oz).    Physical Exam  GENERAL: alert and no distress  EYES: Eyes grossly normal to inspection, PERRL and conjunctivae and sclerae normal  HENT: ear canals and TM's normal, nose and mouth without ulcers or lesions  NECK: no adenopathy, no asymmetry, masses, or scars  RESP: lungs clear to auscultation - no rales, rhonchi or wheezes  CV: regular rate and rhythm, normal S1 S2, no S3 or S4, no murmur, click or rub, no peripheral edema  ABDOMEN: soft, nontender, no hepatosplenomegaly, no masses and bowel sounds normal  MS: no gross musculoskeletal defects noted, no edema  SKIN: no suspicious lesions or rashes  NEURO: Normal strength and tone, mentation intact and speech normal  PSYCH: mentation appears normal, affect normal/bright         4/30/2024   Mini Cog   Clock Draw Score 2 Normal   3 Item Recall 3 objects recalled   Mini Cog Total " Score 5              Signed Electronically by: Matt Whitney MD

## 2024-04-30 NOTE — NURSING NOTE
"/64   Pulse 83   Temp 97.1  F (36.2  C) (Tympanic)   Resp 13   Ht 1.664 m (5' 5.5\")   Wt 81.5 kg (179 lb 11.2 oz)   LMP  (LMP Unknown)   SpO2 96%   BMI 29.45 kg/m      "

## 2024-05-01 ENCOUNTER — TRANSFERRED RECORDS (OUTPATIENT)
Dept: HEALTH INFORMATION MANAGEMENT | Facility: CLINIC | Age: 79
End: 2024-05-01

## 2024-05-01 LAB
ALBUMIN SERPL BCG-MCNC: 4.2 G/DL (ref 3.5–5.2)
ALP SERPL-CCNC: 64 U/L (ref 40–150)
ALT SERPL W P-5'-P-CCNC: 18 U/L (ref 0–50)
ANION GAP SERPL CALCULATED.3IONS-SCNC: 9 MMOL/L (ref 7–15)
AST SERPL W P-5'-P-CCNC: 24 U/L (ref 0–45)
BILIRUB SERPL-MCNC: 0.5 MG/DL
BUN SERPL-MCNC: 19.2 MG/DL (ref 8–23)
CALCIUM SERPL-MCNC: 9.7 MG/DL (ref 8.8–10.2)
CHLORIDE SERPL-SCNC: 101 MMOL/L (ref 98–107)
CHOLEST SERPL-MCNC: 176 MG/DL
CREAT SERPL-MCNC: 1.04 MG/DL (ref 0.51–0.95)
DEPRECATED HCO3 PLAS-SCNC: 27 MMOL/L (ref 22–29)
EGFRCR SERPLBLD CKD-EPI 2021: 54 ML/MIN/1.73M2
FASTING STATUS PATIENT QL REPORTED: YES
GLUCOSE SERPL-MCNC: 96 MG/DL (ref 70–99)
HDLC SERPL-MCNC: 67 MG/DL
LDLC SERPL CALC-MCNC: 92 MG/DL
NONHDLC SERPL-MCNC: 109 MG/DL
POTASSIUM SERPL-SCNC: 4.2 MMOL/L (ref 3.4–5.3)
PROT SERPL-MCNC: 7.7 G/DL (ref 6.4–8.3)
SODIUM SERPL-SCNC: 137 MMOL/L (ref 135–145)
TRIGL SERPL-MCNC: 85 MG/DL
TSH SERPL DL<=0.005 MIU/L-ACNC: 1.57 UIU/ML (ref 0.3–4.2)

## 2024-05-02 ENCOUNTER — OFFICE VISIT (OUTPATIENT)
Dept: OBGYN | Facility: CLINIC | Age: 79
End: 2024-05-02
Payer: COMMERCIAL

## 2024-05-02 VITALS
DIASTOLIC BLOOD PRESSURE: 64 MMHG | BODY MASS INDEX: 28.77 KG/M2 | HEIGHT: 66 IN | SYSTOLIC BLOOD PRESSURE: 109 MMHG | WEIGHT: 179 LBS

## 2024-05-02 DIAGNOSIS — N81.89 PELVIC RELAXATION: Primary | ICD-10-CM

## 2024-05-02 LAB — BACTERIA UR CULT: NORMAL

## 2024-05-02 PROCEDURE — 99213 OFFICE O/P EST LOW 20 MIN: CPT | Performed by: OBSTETRICS & GYNECOLOGY

## 2024-05-14 ENCOUNTER — DOCUMENTATION ONLY (OUTPATIENT)
Dept: INTERNAL MEDICINE | Facility: CLINIC | Age: 79
End: 2024-05-14
Payer: COMMERCIAL

## 2024-05-14 DIAGNOSIS — R82.81 PYURIA, STERILE: Primary | ICD-10-CM

## 2024-05-14 NOTE — PROGRESS NOTES
"Patient has lab only appt 6/4/24 for \"Labs- UA & BMP.\"  no orders in chart.  Please place FUTURE orders in Epic if appropriate.    Per your 5/3/24 letter to pt : \"recheck UA and BMP in 1 month.\"    Thanks,   [facility]    "

## 2024-05-17 ENCOUNTER — PATIENT OUTREACH (OUTPATIENT)
Dept: CARE COORDINATION | Facility: CLINIC | Age: 79
End: 2024-05-17
Payer: COMMERCIAL

## 2024-05-31 ENCOUNTER — OFFICE VISIT (OUTPATIENT)
Dept: OBGYN | Facility: CLINIC | Age: 79
End: 2024-05-31
Payer: COMMERCIAL

## 2024-05-31 VITALS — WEIGHT: 179 LBS | SYSTOLIC BLOOD PRESSURE: 118 MMHG | DIASTOLIC BLOOD PRESSURE: 60 MMHG | BODY MASS INDEX: 29.33 KG/M2

## 2024-05-31 DIAGNOSIS — N81.89 PELVIC RELAXATION: Primary | ICD-10-CM

## 2024-05-31 PROCEDURE — 99212 OFFICE O/P EST SF 10 MIN: CPT | Performed by: OBSTETRICS & GYNECOLOGY

## 2024-05-31 PROCEDURE — G2211 COMPLEX E/M VISIT ADD ON: HCPCS | Performed by: OBSTETRICS & GYNECOLOGY

## 2024-05-31 NOTE — PROGRESS NOTES
SUBJECTIVE:         I spent a total of 15 minutes on the care of Kierra on the day of service including 10 minutes of face-to-face time with remainder in chart review, care coordination, documentation on the date of service.                                                Kierra Mcmullen is a 79 year old female who presents to clinic today for the following health issue(s):  Pessary check  No chief complaint on file.    Kierra was seen few weeks back and uses a Gellhorn for pessary.  She notes that it is not satisfactory and that it starts out well where she can pee but then she has a difficult time peeing and she feels that her bladder still descends around it.  Her's  Teofilo is the one that inserts that and removes it for her and he is quite adept at doing that.  He is present in the exam room along with my nurse.  We talked about options and 1 consideration would be to use the Gellhorn but also add a ring to at all and see if that would improve the issue.  She of note is planning to have surgery coming up in approximately 3 months.  This is just to bridge it between now and then.  She does use an estrogen product that is compound pharmacy and suggest that she use it consistently or at least get a prescription for something that is covered by her formulary.    Examination:  She is pleasant appropriate no apparent distress the pessary was removed without difficulty cleaned and reinserted.  She does have a very roomy pelvis and we talked about risks and benefits of having a second pessary placed which is not typical but as long as her  is able to remove it I think that she is in a safe situation.  He seems to be adept at removing a Gellhorn and I instructed him how to remove the ring.  At this point there is not enough time during the day for her to be tried on this as it seems that all other pessaries have failed her.    Assessment is pelvic relaxation with the dissatisfied resolved from her chronic use of  zeinab Cristina.  Plan is follow-up in approximately 2 weeks      No LMP recorded (lmp unknown). Patient has had a hysterectomy..     Patient is sexually active, .  Using none for contraception.    reports that she quit smoking about 37 years ago. Her smoking use included cigarettes. She started smoking about 62 years ago. She has a 25 pack-year smoking history. She has been exposed to tobacco smoke. She has never used smokeless tobacco.    STD testing offered?  Declined    Health maintenance updated:  no    Today's PHQ-2 Score:       2024     1:20 PM   PHQ-2 (  [employer])   Q1: Little interest or pleasure in doing things 0   Q2: Feeling down, depressed or hopeless 0   PHQ-2 Score 0   Q1: Little interest or pleasure in doing things Not at all   Q2: Feeling down, depressed or hopeless Not at all   PHQ-2 Score 0     Today's PHQ-9 Score:        No data to display              Today's GWEN-7 Score:        No data to display                Problem list and histories reviewed & adjusted, as indicated.  Additional history: as documented.    Patient Active Problem List   Diagnosis    Obesity    Prolapse of vaginal wall    Recurrent UTI    Advance Care Planning    Postmenopausal bleeding    Cystocele, midline    Enterocele    Hypothyroidism    Osteopenia    Adenomatous polyp of colon    Degenerative arthritis of knee    Pyuria, sterile    Greater trochanteric bursitis of left hip    History of total knee arthroplasty, left     Past Surgical History:   Procedure Laterality Date    ARTHROPLASTY KNEE Left 2015    Procedure: ARTHROPLASTY KNEE;  Surgeon: Daniel Mack MD;  Location:  OR    CHOLECYSTECTOMY      COLONOSCOPY N/A 2016    Procedure: COLONOSCOPY;  Surgeon: Sage Berg MD;  Location:  GI    CYSTOSCOPY  2012    Procedure:CYSTOSCOPY; Surgeon:GAMA GRIDER; Location: OR    DAVINCI HYSTERECTOMY SUPRACERVICAL, SACROCOLPOPEXY, COMBINED  2012    Procedure:COMBINED DAVINCI  HYSTERECTOMY SUPRACERVICAL, SACROCOLPOPEXY; DAVINCI LAPAROSCOPIC SUPRACERVICAL HYSTERECTOMY, BILATERAL SALPINGO-OOPHORECTOMY, SACROCOLPOPEXY  WITH COLOPLASTY MESH AND CYSTOPLASTY; Surgeon:GAMA GRIDER; Location:[facility] OR    HC REMOVAL OF TONSILS,<11 Y/O      Tonsils <12y.o.    HC TOOTH EXTRACTION W/FORCEP      ZZC NONSPECIFIC PROCEDURE      Cholecystectomy -- Abstracted 02    ZZ COLONOSCOPY THRU STOMA, DIAGNOSTIC        Social History     Tobacco Use    Smoking status: Former     Current packs/day: 0.00     Average packs/day: 1 pack/day for 25.0 years (25.0 ttl pk-yrs)     Types: Cigarettes     Start date: 1962     Quit date: 1987     Years since quittin.4     Passive exposure: Past    Smokeless tobacco: Never    Tobacco comments:        Substance Use Topics    Alcohol use: Yes     Alcohol/week: 10.0 standard drinks of alcohol     Comment: 1-2 glasses of wine/week      Problem (# of Occurrences) Relation (Name,Age of Onset)    Alzheimer Disease (1) Father    Arthritis (1) Mother (Rossana Milner): RA    Cancer (1) Mother (Rossana Milner):  non-Hodgkins lymphoma    Cardiovascular (1) Maternal Aunt: MI    Gastrointestinal Disease (2) Mother (Rossana Milner): UC, Sister (Ca Russell): diverticulitis    Osteoporosis (2) Mother (Rossana Milner), Sister (Ca Russell)    Thyroid Disease (1) Mother (Rossana Milner)    Breast Cancer (1) Paternal Aunt    Bartter's syndrome (1) Sister (Ca Russell)    Other Cancer (1) Mother (Rossana Milner): Non-Hodgkins Lymphoma              Current Outpatient Medications   Medication Sig Dispense Refill    Biotin 5000 MCG CAPS       CALCIUM + D 600-200 MG-IU OR TABS 1 TABLET DAILY 30 0    calcium polycarbophil (FIBERCON) 625 MG tablet Take 2 tablets by mouth daily      COMPOUNDED NON-CONTROLLED SUBSTANCE (CMPD RX) - PHARMACY TO MIX COMPOUNDED MEDICATION Estradiol 0.02% in HRT heavy (cream base)  Place in tube and include vaginal applicator Si/2 gm intravaginally  at  on Mon and Thurs pm as directed 42 g 3    CRANBERRY-VITAMIN C-D MANNOSE PO       D-Mannose 500 MG CAPS Take 500 mg by mouth      GLUCOSAMINE CHONDROITIN COMPLX PO Take 1 tablet by mouth daily 750/600      levothyroxine (SYNTHROID/LEVOTHROID) 75 MCG tablet Take Levothyroxine 75 mcg PO daily 90 tablet 3    MULTI-DAY OR TABS one PO QD      Oxyquinolone sulfate (TRIMO-YU) 0.025 % GEL Place 1 Dose vaginally daily as needed (Patient not taking: Reported on 4/30/2024) 113 g 3    solifenacin (VESICARE) 10 MG tablet Take 1 tablet (10 mg) by mouth daily 90 tablet 3     No current facility-administered medications for this visit.     No Known Allergies          OBJECTIVE:     LMP  (LMP Unknown)   There is no height or weight on file to calculate BMI.    Exam:  See above    In-Clinic Test Results:  No results found for this or any previous visit (from the past 24 hour(s)).    ASSESSMENT/PLAN:                                                      See above    Nitish Fowler MD  [facility]

## 2024-05-31 NOTE — NURSING NOTE
"Chief Complaint   Patient presents with    Pessary Check/Fit/Insert       Initial /60   Wt 81.2 kg (179 lb)   LMP  (LMP Unknown)   BMI 29.33 kg/m   Estimated body mass index is 29.33 kg/m  as calculated from the following:    Height as of 24: 1.664 m (5' 5.5\").    Weight as of this encounter: 81.2 kg (179 lb).  BP completed using cuff size: regular    Questioned patient about current smoking habits.  Pt. has never smoked.          Laurie Moffett LPN on 2024 at 3:05 PM             "

## 2024-06-04 ENCOUNTER — LAB (OUTPATIENT)
Dept: LAB | Facility: CLINIC | Age: 79
End: 2024-06-04
Payer: COMMERCIAL

## 2024-06-04 DIAGNOSIS — R82.81 PYURIA, STERILE: ICD-10-CM

## 2024-06-04 PROCEDURE — 81001 URINALYSIS AUTO W/SCOPE: CPT

## 2024-06-04 PROCEDURE — 87186 SC STD MICRODIL/AGAR DIL: CPT

## 2024-06-04 PROCEDURE — 36416 COLLJ CAPILLARY BLOOD SPEC: CPT

## 2024-06-04 PROCEDURE — 80048 BASIC METABOLIC PNL TOTAL CA: CPT

## 2024-06-04 PROCEDURE — 87086 URINE CULTURE/COLONY COUNT: CPT

## 2024-06-05 LAB
ANION GAP SERPL CALCULATED.3IONS-SCNC: 11 MMOL/L (ref 7–15)
BACTERIA UR CULT: ABNORMAL
BUN SERPL-MCNC: 17 MG/DL (ref 8–23)
CALCIUM SERPL-MCNC: 9.3 MG/DL (ref 8.8–10.2)
CHLORIDE SERPL-SCNC: 102 MMOL/L (ref 98–107)
CREAT SERPL-MCNC: 0.93 MG/DL (ref 0.51–0.95)
DEPRECATED HCO3 PLAS-SCNC: 25 MMOL/L (ref 22–29)
EGFRCR SERPLBLD CKD-EPI 2021: 62 ML/MIN/1.73M2
GLUCOSE SERPL-MCNC: 97 MG/DL (ref 70–99)
POTASSIUM SERPL-SCNC: 4.3 MMOL/L (ref 3.4–5.3)
SODIUM SERPL-SCNC: 138 MMOL/L (ref 135–145)

## 2024-06-07 ENCOUNTER — TELEPHONE (OUTPATIENT)
Dept: INTERNAL MEDICINE | Facility: CLINIC | Age: 79
End: 2024-06-07
Payer: COMMERCIAL

## 2024-06-07 DIAGNOSIS — N39.0 RECURRENT UTI: Primary | ICD-10-CM

## 2024-06-07 RX ORDER — SULFAMETHOXAZOLE/TRIMETHOPRIM 800-160 MG
1 TABLET ORAL 2 TIMES DAILY
Qty: 14 TABLET | Refills: 0 | Status: SHIPPED | OUTPATIENT
Start: 2024-06-07

## 2024-06-07 NOTE — TELEPHONE ENCOUNTER
Call to pt and advised to start the Bactrim. Pt states she is getting several UTI lately.      She is asking if needs a recheck of her urine about a week after antibiotics to make sure it is cleared up?    Please place UA order if OK.     OK to Leave detailed message or send mychart message.

## 2024-06-07 NOTE — TELEPHONE ENCOUNTER
Patient calls back, informed UA results are positive for E-coli, is still symptomatic and is wanting treatment.  Patient prefers [facility] [address] in [place].  If needing to get a hold of patient, please call her at [phone]. Will route to pool and provider

## 2024-06-11 NOTE — PROGRESS NOTES
SUBJECTIVE:          I spent a total of 30 minutes in the care of Kierra on the day of service including 20 minutes of face-to-face time with remainder in chart review, care coordination, documentation on the date of service.                                             Kierra Mcmullen is a 79 year old female who presents to clinic today for the following health issue(s): pessary check.   No chief complaint on file.    Jonathan will be seen in [place] for evaluation and treatment of pelvic relaxation.  She has been managed for number of years with pessaries but every pessary that she has tried has been suboptimal and satisfaction.  She has used a Gellhorn as well as a ring with diaphragm and comes with a bag that has as many as 8 different pessaries that she has used or tried in the past.  More recently we talked about bridging the gap between now and this fall with her discomfort in incontinence as well as pelvic pain placing both the Gellhorn and a ring in combination.  She notes that when the Gellhorn is pointing downwards within it tends to be felt in the rectum and that is been not tolerated well otherwise with the rings they have either been expelled or not satisfactorily managing the the redundancy of the vaginal tissue that overlies the ring and protrudes to the introitus.    I invited him to try an experiment and that would be to use to pessaries at the same time and 1 will be the Gellhorn and what would be the ring.  And she has tried that for the last hour here in the clinic but noted that she expelled the ring.  She has used a larger ring and she was willing to try the larger ring.  The ring is placed so that it sits on top of the Gellhorn.  She knows that if she starts having pelvic pain or inability to void that she needs to have them taken out.    Her  is quite adept at inserting and removing these pessaries and he has done this for years for her.  He understands how this is placed and and feels  comfortable with the ability to remove them should she be starting to have pelvic pain.  He was able to remove the Gellhorn which seems to be more difficult in general than the rings for removal he does not seem to have much trouble with that.    Patient tried the small Gellhorn and slightly smaller ring and as noted above was comfortable with that but then the ring expelled while on the toilet.  She is interested in trying the same smaller Gellhorn and she typically uses with a larger ring and so that was placed in at this point she is comfortable leaving without waiting to try and void and in her situation where she is getting very good care with removal and insertion of these devices so I suggest that would be fine.    Examination:  The ring pessary was removed and final vaginal check was done without any signs of abrasions or fistulous tears etc.  She was complaining of bloody-brownish discharge this morning.  There is no evidence of any internal source of any bleeding.  Vaginal walls were carefully inspected with a speculum cervix is intact without lesions      Assessment:  79-year-old with history of pelvic relaxation planning to be evaluated and treated in [facility] here in approximately 6 weeks.  Bridge that time displacement suggest that she try the combination of 2 pessaries and see if the benefits of each will work for her and not create more trouble.    Plan:  She is discharged from the clinic with the Gellhorn that is residing on the posterior side of the ring and the ring is is adequately keeping the bladder and out of the introitus.  She knows that if she has trouble with that to have them removed and then invited to come back in a week to check-in or 2 or if it is going very well for her see her back in a month      No LMP recorded (lmp unknown). Patient has had a hysterectomy..     Patient is sexually active, .  Using none for contraception.    reports that she  quit smoking about 37 years ago. Her smoking use included cigarettes. She started smoking about 62 years ago. She has a 25 pack-year smoking history. She has been exposed to tobacco smoke. She has never used smokeless tobacco.    STD testing offered?  Declined    Health maintenance updated:  no    Today's PHQ-2 Score:       4/29/2024     1:20 PM   PHQ-2 ( 1999 [employer])   Q1: Little interest or pleasure in doing things 0   Q2: Feeling down, depressed or hopeless 0   PHQ-2 Score 0   Q1: Little interest or pleasure in doing things Not at all   Q2: Feeling down, depressed or hopeless Not at all   PHQ-2 Score 0     Today's PHQ-9 Score:        No data to display              Today's GWEN-7 Score:        No data to display                Problem list and histories reviewed & adjusted, as indicated.  Additional history: as documented.    Patient Active Problem List   Diagnosis    Obesity    Prolapse of vaginal wall    Recurrent UTI    Advance Care Planning    Postmenopausal bleeding    Cystocele, midline    Enterocele    Hypothyroidism    Osteopenia    Adenomatous polyp of colon    Degenerative arthritis of knee    Pyuria, sterile    Greater trochanteric bursitis of left hip    History of total knee arthroplasty, left     Past Surgical History:   Procedure Laterality Date    ARTHROPLASTY KNEE Left 5/19/2015    Procedure: ARTHROPLASTY KNEE;  Surgeon: Daniel Mack MD;  Location:  OR    CHOLECYSTECTOMY      COLONOSCOPY N/A 5/5/2016    Procedure: COLONOSCOPY;  Surgeon: Sage Berg MD;  Location:  GI    CYSTOSCOPY  1/31/2012    Procedure:CYSTOSCOPY; Surgeon:GAMA GRIDER; Location: OR    DAVINCI HYSTERECTOMY SUPRACERVICAL, SACROCOLPOPEXY, COMBINED  1/31/2012    Procedure:COMBINED DAVINCI HYSTERECTOMY SUPRACERVICAL, SACROCOLPOPEXY; DAVINCI LAPAROSCOPIC SUPRACERVICAL HYSTERECTOMY, BILATERAL SALPINGO-OOPHORECTOMY, SACROCOLPOPEXY  WITH COLOPLASTY MESH AND CYSTOPLASTY; Surgeon:GAMA GRIDER; Location: OR     HC REMOVAL OF TONSILS,<13 Y/O      Tonsils <12y.o.    HC TOOTH EXTRACTION W/FORCEP      ZZC NONSPECIFIC PROCEDURE      Cholecystectomy -- Abstracted 02    [facility] COLONOSCOPY THRU STOMA, DIAGNOSTIC        Social History     Tobacco Use    Smoking status: Former     Current packs/day: 0.00     Average packs/day: 1 pack/day for 25.0 years (25.0 ttl pk-yrs)     Types: Cigarettes     Start date: 1962     Quit date: 1987     Years since quittin.4     Passive exposure: Past    Smokeless tobacco: Never    Tobacco comments:        Substance Use Topics    Alcohol use: Yes     Alcohol/week: 10.0 standard drinks of alcohol     Comment: 1-2 glasses of wine/week      Problem (# of Occurrences) Relation (Name,Age of Onset)    Alzheimer Disease (1) Father    Arthritis (1) Mother (Rossana Milner): RA    Cancer (1) Mother (Rossana Milner):  non-Hodgkins lymphoma    Cardiovascular (1) Maternal Aunt: MI    Gastrointestinal Disease (2) Mother (Rossana Milner): UC, Sister (Ca Russell): diverticulitis    Osteoporosis (2) Mother (Rossana Milner), Sister (Ca Russell)    Thyroid Disease (1) Mother (Rossana Milner)    Breast Cancer (1) Paternal Aunt    Bartter's syndrome (1) Sister (Ca Russell)    Other Cancer (1) Mother (Rossana Milner): Non-Hodgkins Lymphoma              Current Outpatient Medications   Medication Sig Dispense Refill    Biotin 5000 MCG CAPS       CALCIUM + D 600-200 MG-IU OR TABS 1 TABLET DAILY 30 0    calcium polycarbophil (FIBERCON) 625 MG tablet Take 2 tablets by mouth daily      COMPOUNDED NON-CONTROLLED SUBSTANCE (CMPD RX) - PHARMACY TO MIX COMPOUNDED MEDICATION Estradiol 0.02% in HRT heavy (cream base)  Place in tube and include vaginal applicator Si/2 gm intravaginally at HS on Mon and Thurs pm as directed 42 g 3    CRANBERRY-VITAMIN C-D MANNOSE PO       D-Mannose 500 MG CAPS Take 500 mg by mouth      GLUCOSAMINE CHONDROITIN COMPLX PO Take 1 tablet by mouth daily 750/600       levothyroxine (SYNTHROID/LEVOTHROID) 75 MCG tablet Take Levothyroxine 75 mcg PO daily 90 tablet 3    MULTI-DAY OR TABS one PO QD      Oxyquinolone sulfate (TRIMO-YU) 0.025 % GEL Place 1 Dose vaginally daily as needed (Patient not taking: Reported on 4/30/2024) 113 g 3    solifenacin (VESICARE) 10 MG tablet Take 1 tablet (10 mg) by mouth daily 90 tablet 3    sulfamethoxazole-trimethoprim (BACTRIM DS) 800-160 MG tablet Take 1 tablet by mouth 2 times daily 14 tablet 0     No current facility-administered medications for this visit.     No Known Allergies    ROS:  12 point review of systems negative other than symptoms noted below or in the HPI.        OBJECTIVE:     LMP  (LMP Unknown)   There is no height or weight on file to calculate BMI.    Exam:  See above    In-Clinic Test Results:  No results found for this or any previous visit (from the past 24 hour(s)).    ASSESSMENT/PLAN:                                                      See above    Nitish Fowler MD  [facility]

## 2024-06-13 ENCOUNTER — OFFICE VISIT (OUTPATIENT)
Dept: OBGYN | Facility: CLINIC | Age: 79
End: 2024-06-13
Payer: COMMERCIAL

## 2024-06-13 VITALS — WEIGHT: 169 LBS | SYSTOLIC BLOOD PRESSURE: 136 MMHG | BODY MASS INDEX: 27.7 KG/M2 | DIASTOLIC BLOOD PRESSURE: 76 MMHG

## 2024-06-13 DIAGNOSIS — N81.89 PELVIC RELAXATION: Primary | ICD-10-CM

## 2024-06-13 PROCEDURE — 99213 OFFICE O/P EST LOW 20 MIN: CPT | Performed by: OBSTETRICS & GYNECOLOGY

## 2024-06-13 NOTE — NURSING NOTE
"Chief Complaint   Patient presents with    Pessary Check/Fit/Insert       Initial /76   Wt 76.7 kg (169 lb)   LMP  (LMP Unknown)   BMI 27.70 kg/m   Estimated body mass index is 27.7 kg/m  as calculated from the following:    Height as of 24: 1.664 m (5' 5.5\").    Weight as of this encounter: 76.7 kg (169 lb).  BP completed using cuff size: regular    Questioned patient about current smoking habits.  Pt. has never smoked.          The following HM Due: NONE      Laurie Moffett LPN on 2024 at 10:33 AM         "

## 2024-06-14 ENCOUNTER — PATIENT OUTREACH (OUTPATIENT)
Dept: CARE COORDINATION | Facility: CLINIC | Age: 79
End: 2024-06-14
Payer: COMMERCIAL

## 2024-06-17 ENCOUNTER — HOSPITAL ENCOUNTER (OUTPATIENT)
Dept: MAMMOGRAPHY | Facility: CLINIC | Age: 79
Discharge: HOME | End: 2024-06-17
Attending: INTERNAL MEDICINE | Admitting: INTERNAL MEDICINE
Payer: COMMERCIAL

## 2024-06-17 DIAGNOSIS — Z12.31 VISIT FOR SCREENING MAMMOGRAM: ICD-10-CM

## 2024-06-17 PROCEDURE — 77063 BREAST TOMOSYNTHESIS BI: CPT

## 2024-06-24 ENCOUNTER — LAB (OUTPATIENT)
Dept: LAB | Facility: CLINIC | Age: 79
End: 2024-06-24
Payer: COMMERCIAL

## 2024-06-24 DIAGNOSIS — N39.0 RECURRENT UTI: ICD-10-CM

## 2024-06-24 LAB
ALBUMIN UR-MCNC: NEGATIVE MG/DL
APPEARANCE UR: CLEAR
BILIRUB UR QL STRIP: NEGATIVE
COLOR UR AUTO: YELLOW
GLUCOSE UR STRIP-MCNC: NEGATIVE MG/DL
HGB UR QL STRIP: NEGATIVE
KETONES UR STRIP-MCNC: NEGATIVE MG/DL
LEUKOCYTE ESTERASE UR QL STRIP: NEGATIVE
NITRATE UR QL: NEGATIVE
PH UR STRIP: 6.5 [PH] (ref 5–7)
RBC #/AREA URNS AUTO: ABNORMAL /HPF
SP GR UR STRIP: 1.01 (ref 1–1.03)
SQUAMOUS #/AREA URNS AUTO: ABNORMAL /LPF
UROBILINOGEN UR STRIP-ACNC: 0.2 E.U./DL
WBC #/AREA URNS AUTO: ABNORMAL /HPF

## 2024-06-24 PROCEDURE — 81001 URINALYSIS AUTO W/SCOPE: CPT

## 2024-06-24 NOTE — LETTER
June 24, 2024      Kierra ZAYAS Benedict  [address] [place] [address]        Dear ,    We are writing to inform you of your test results.    Your results are within normal limits.    Resulted Orders   UA with Microscopic reflex to Culture - lab collect   Result Value Ref Range    Color Urine Yellow Colorless, Straw, Light Yellow, Yellow    Appearance Urine Clear Clear    Glucose Urine Negative Negative mg/dL    Bilirubin Urine Negative Negative    Ketones Urine Negative Negative mg/dL    Specific Gravity Urine 1.015 1.003 - 1.035    Blood Urine Negative Negative    pH Urine 6.5 5.0 - 7.0    Protein Albumin Urine Negative Negative mg/dL    Urobilinogen Urine 0.2 0.2, 1.0 E.U./dL    Nitrite Urine Negative Negative    Leukocyte Esterase Urine Negative Negative   UA Microscopic with Reflex to Culture   Result Value Ref Range    RBC Urine 0-2 0-2 /HPF /HPF    WBC Urine 0-5 0-5 /HPF /HPF    Squamous Epithelials Urine Few (A) None Seen /LPF    Narrative    Urine Culture not indicated       If you have any questions or concerns, please call the clinic at the number listed above.       Sincerely,      Matt Whitney MD

## 2024-07-08 NOTE — PROGRESS NOTES
SUBJECTIVE:        I spent a total of 15 minutes on the care of Kierra on the day of service and including 10 minutes of face-to-face time with remainder in chart review, care coordination, documentation on the date of service.                                               Kierra Mcmullen is a 79 year old female who presents to clinic today for the following health issue(s):  Patient presents with:  Pessary Check/Fit/Insert: LOV 24    Kierra is a 79-year-old who is scheduled to be seen and mailed out early part of October for revision surgery function.    Most recently this provide combination of 2 different pessaries.  She has had that for the last month and took it out (both of them) yesterday and states that it was a very good month with the pessary idea.  She is essentially using a small Gellhorn and also the vaginal ring with diaphragm.  The positioning of has made so that her complaints of pelvic resolved.    She did have some spotting with the removal of the pessaries.  She has been checked within the last month suggest.  Check.  The pessary was reinserted yesterday.    She has been anticholinergic medication and there is no significant vaginal dryness.  Suggest that instead of using the medication that she go through bladder training and we reviewed how that is done starting with voiding every hour.  She seems motivated to try the as her dry mouth has been difficult to live with.    Examination is deferred    Assessment is pelvic relaxation seeming to do very well with the combination of the Gellhorn and the ring diaphragm     Plan is suggest she return in a month's time for check and review of how she is doing well with bladder training.  They are planning to go on a cruise in approximately 6 weeks we should at least be evaluated to make sure that there are no issues.    No LMP recorded (lmp unknown). Patient has had a hysterectomy..     Patient is not sexually active, .  Using hysterectomy for  contraception.    reports that she quit smoking about 37 years ago. Her smoking use included cigarettes. She started smoking about 62 years ago. She has a 25 pack-year smoking history. She has been exposed to tobacco smoke. She has never used smokeless tobacco.    STD testing offered?  Declined    Health maintenance updated:  no    Today's PHQ-2 Score:       4/29/2024     1:20 PM   PHQ-2 ( 1999 [employer])   Q1: Little interest or pleasure in doing things 0   Q2: Feeling down, depressed or hopeless 0   PHQ-2 Score 0   Q1: Little interest or pleasure in doing things Not at all   Q2: Feeling down, depressed or hopeless Not at all   PHQ-2 Score 0     Today's PHQ-9 Score:        No data to display              Today's GWEN-7 Score:        No data to display                Problem list and histories reviewed & adjusted, as indicated.  Additional history: as documented.    Patient Active Problem List   Diagnosis    Obesity    Prolapse of vaginal wall    Recurrent UTI    Postmenopausal bleeding    Cystocele, midline    Enterocele    Hypothyroidism    Osteopenia    Adenomatous polyp of colon    Degenerative arthritis of knee    Pyuria, sterile    Greater trochanteric bursitis of left hip    History of total knee arthroplasty, left     Past Surgical History:   Procedure Laterality Date    ARTHROPLASTY KNEE Left 5/19/2015    Procedure: ARTHROPLASTY KNEE;  Surgeon: Daniel Mack MD;  Location:  OR    CHOLECYSTECTOMY      COLONOSCOPY N/A 5/5/2016    Procedure: COLONOSCOPY;  Surgeon: Sage Berg MD;  Location:  GI    CYSTOSCOPY  1/31/2012    Procedure:CYSTOSCOPY; Surgeon:GAMA GRIDER; Location: OR    DAVINCI HYSTERECTOMY SUPRACERVICAL, SACROCOLPOPEXY, COMBINED  1/31/2012    Procedure:COMBINED DAVINCI HYSTERECTOMY SUPRACERVICAL, SACROCOLPOPEXY; DAVINCI LAPAROSCOPIC SUPRACERVICAL HYSTERECTOMY, BILATERAL SALPINGO-OOPHORECTOMY, SACROCOLPOPEXY  WITH COLOPLASTY MESH AND CYSTOPLASTY; Surgeon:GAMA GRIDER;  Location:[facility] OR    HC REMOVAL OF TONSILS,<13 Y/O      Tonsils <12y.o.    HC TOOTH EXTRACTION W/FORCEP      ZZC NONSPECIFIC PROCEDURE      Cholecystectomy -- Abstracted 02    [facility] COLONOSCOPY THRU STOMA, DIAGNOSTIC        Social History     Tobacco Use    Smoking status: Former     Current packs/day: 0.00     Average packs/day: 1 pack/day for 25.0 years (25.0 ttl pk-yrs)     Types: Cigarettes     Start date: 1962     Quit date: 1987     Years since quittin.5     Passive exposure: Past    Smokeless tobacco: Never    Tobacco comments:        Substance Use Topics    Alcohol use: Yes     Alcohol/week: 10.0 standard drinks of alcohol     Comment: 1-2 glasses of wine/week      Problem (# of Occurrences) Relation (Name,Age of Onset)    Alzheimer Disease (1) Father    Arthritis (1) Mother (Rossana Milner): RA    Cancer (1) Mother (Rossana Milner):  non-Hodgkins lymphoma    Cardiovascular (1) Maternal Aunt: MI    Gastrointestinal Disease (2) Mother (Rossana Milner): UC, Sister (Ca Russell): diverticulitis    Osteoporosis (2) Mother (Rossana Milner), Sister (Ca Russell)    Thyroid Disease (1) Mother (Rossana Milner)    Breast Cancer (1) Paternal Aunt    Bartter's syndrome (1) Sister (Ca Russell)    Other Cancer (1) Mother (Rossana Milner): Non-Hodgkins Lymphoma              Current Outpatient Medications   Medication Sig Dispense Refill    Biotin 5000 MCG CAPS       CALCIUM + D 600-200 MG-IU OR TABS 1 TABLET DAILY 30 0    calcium polycarbophil (FIBERCON) 625 MG tablet Take 2 tablets by mouth daily      COMPOUNDED NON-CONTROLLED SUBSTANCE (CMPD RX) - PHARMACY TO MIX COMPOUNDED MEDICATION Estradiol 0.02% in HRT heavy (cream base)  Place in tube and include vaginal applicator Si/2 gm intravaginally at HS on Mon and Thurs pm as directed 42 g 3    CRANBERRY-VITAMIN C-D MANNOSE PO       D-Mannose 500 MG CAPS Take 500 mg by mouth      GLUCOSAMINE CHONDROITIN COMPLX PO Take 1 tablet by mouth daily  "750/600      levothyroxine (SYNTHROID/LEVOTHROID) 75 MCG tablet Take Levothyroxine 75 mcg PO daily 90 tablet 3    MULTI-DAY OR TABS one PO QD      solifenacin (VESICARE) 10 MG tablet Take 1 tablet (10 mg) by mouth daily 90 tablet 3    sulfamethoxazole-trimethoprim (BACTRIM DS) 800-160 MG tablet Take 1 tablet by mouth 2 times daily 14 tablet 0    Oxyquinolone sulfate (TRIMO-YU) 0.025 % GEL Place 1 Dose vaginally daily as needed (Patient not taking: Reported on 4/30/2024) 113 g 3     No current facility-administered medications for this visit.     No Known Allergies        OBJECTIVE:     /68   Ht 1.664 m (5' 5.5\")   Wt 76.7 kg (169 lb)   LMP  (LMP Unknown)   BMI 27.70 kg/m    Body mass index is 27.7 kg/m .    Exam:  Deferred    In-Clinic Test Results:  No results found for this or any previous visit (from the past 24 hour(s)).    ASSESSMENT/PLAN:                                                      See above    Nitish Fowler MD  [facility] [place]    "

## 2024-07-10 ENCOUNTER — OFFICE VISIT (OUTPATIENT)
Dept: OBGYN | Facility: CLINIC | Age: 79
End: 2024-07-10
Payer: COMMERCIAL

## 2024-07-10 VITALS
WEIGHT: 169 LBS | SYSTOLIC BLOOD PRESSURE: 114 MMHG | DIASTOLIC BLOOD PRESSURE: 68 MMHG | BODY MASS INDEX: 27.16 KG/M2 | HEIGHT: 66 IN

## 2024-07-10 DIAGNOSIS — N81.89 PELVIC RELAXATION: Primary | ICD-10-CM

## 2024-07-10 PROCEDURE — 99212 OFFICE O/P EST SF 10 MIN: CPT | Performed by: OBSTETRICS & GYNECOLOGY

## 2024-07-10 NOTE — NURSING NOTE
"Chief Complaint   Patient presents with    Pessary Check/Fit/Insert     LOV 24       Initial /68   Ht 1.664 m (5' 5.5\")   Wt 76.7 kg (169 lb)   LMP  (LMP Unknown)   BMI 27.70 kg/m   Estimated body mass index is 27.7 kg/m  as calculated from the following:    Height as of this encounter: 1.664 m (5' 5.5\").    Weight as of this encounter: 76.7 kg (169 lb).  BP completed using cuff size: regular    Questioned patient about current smoking habits.  Pt. has never smoked.          The following HM Due: NONE    Left it in for about 1 month, took out yesterday    Beryl Reyes CMA on 7/10/2024 at 8:55 AM    "

## 2024-08-28 ENCOUNTER — NURSE TRIAGE (OUTPATIENT)
Dept: INTERNAL MEDICINE | Facility: CLINIC | Age: 79
End: 2024-08-28
Payer: COMMERCIAL

## 2024-08-28 NOTE — TELEPHONE ENCOUNTER
Order/Referral Request    Who is requesting: PATIENT    Orders being requested: ALLERGIST REFFERAL     Reason service is needed/diagnosis: ALLERGY    When are orders needed by: N/A    Has this been discussed with Provider: No    Does patient have a preference on a Group/Provider/Facility? [facility] IF THEY HAVE ONE THERE.     Does patient have an appointment scheduled?: No    Where to send orders: Place orders within Epic    Could we send this information to you in [facility] or would you prefer to receive a phone call?:   Patient would prefer a phone call   Okay to leave a detailed message?: Yes at Cell number on file:    Telephone Information:   Mobile [phone]

## 2024-08-29 NOTE — TELEPHONE ENCOUNTER
Attempted to contact pt. Left message to call clinic.     Need more information, what is the diagnosis? She doesn't have allergies on her problem list. Does she take anything OTC?

## 2024-08-29 NOTE — TELEPHONE ENCOUNTER
Call back from patient. States she has never had allergies before. Says her eyes are really bothering her. States from her eyebrow to her eye lid on both eyes affected to the point that it is affecting her ability to read. States symptoms have been present for about 3 weeks. Patient has tried a [facility] allergy relief antihistamine, Soothe eye drops and refresh eye drops but none of these have helped. Denies drainage but does have itchiness of the eye lids. Patient has an appointment with eye doctor on 9/5/24. Advised appointment. Scheduled.     Reason for Disposition   Taking allergy medicine > 2 days and eyes still very itchy or still has pus on eyelids.   Diagnosis of eye allergy never confirmed    Additional Information   Negative: Chemical got in the eye   Negative: Piece of something got in the eye   Negative: Followed an eye injury   Negative: Yellow or green pus in the eyes   Negative: Eyelid is swollen and no redness of white of eye (sclera)   Negative: SEVERE eye pain   Negative: Recent eye surgery and has increasing eye pain   Negative: Patient sounds very sick or weak to the triager   Negative: MODERATE eye pain or discomfort (e.g., interferes with normal activities or awakens from sleep; more than mild)   Negative: Looking at light causes MODERATE to SEVERE eye pain (i.e., photophobia)   Negative: New or worsening blurred vision   Negative: Cloudy spot or sore seen on the cornea (clear part of the eye)   Negative: Eyelids are very swollen (shut or almost)   Negative: Eyelid (outer) is very red   Negative: Vomiting   Negative: Foreign body sensation ('feels like something is in there')   Negative: Patient wants to be seen   Negative: Eye pain present > 24 hours   Negative: Bleeding on white of the eye and is taking Coumadin or known bleeding disorder (e.g., thrombocytopenia)   Negative: Only 1 eye is red, and persists > 48 hours   Negative: Red eyes present > 7 days   Negative: Bleeding on white of the eye    Negative: Red eye caused by sunscreen, smoke, smog, chlorine, food, soap or other mild irritant   Negative: Red eye caused by contact lens, and no eye pain or blurred vision   Negative: Red eye is part of a cold, and no eye pain or blurred vision   Negative: Chemical gets into the eye from fingers, contaminated object, spray, or splash   Negative: Eye pain   Negative: Runny nose, nose itching, and sneezing also present   Negative: Reaction to antibiotic eye drops   Negative: Blurred vision and new or worsening   Negative: Sacs of clear fluid (blisters) on whites of eyes or inner lids   Negative: Eyelids are very swollen (shut or almost)   Negative: Contact lenses makes itching or redness worse   Negative: Patient wants to be seen   Negative: Eye allergy is a chronic symptom (recurrent or ongoing AND present > 4 weeks)    Protocols used: Eye - Red Without Pus-A-OH, Eye - Allergy-A-OH

## 2024-08-30 ENCOUNTER — OFFICE VISIT (OUTPATIENT)
Dept: INTERNAL MEDICINE | Facility: CLINIC | Age: 79
End: 2024-08-30
Payer: COMMERCIAL

## 2024-08-30 VITALS
HEIGHT: 66 IN | OXYGEN SATURATION: 96 % | RESPIRATION RATE: 16 BRPM | TEMPERATURE: 97.1 F | SYSTOLIC BLOOD PRESSURE: 118 MMHG | BODY MASS INDEX: 27.7 KG/M2 | HEART RATE: 94 BPM | DIASTOLIC BLOOD PRESSURE: 68 MMHG

## 2024-08-30 DIAGNOSIS — H04.123 DRY EYES: Primary | ICD-10-CM

## 2024-08-30 PROCEDURE — 99214 OFFICE O/P EST MOD 30 MIN: CPT

## 2024-08-30 RX ORDER — MINERAL OIL AND WHITE PETROLATUM 30; 940 MG/G; MG/G
OINTMENT OPHTHALMIC SEE ADMIN INSTRUCTIONS
Qty: 3.5 G | Refills: 1 | Status: SHIPPED | OUTPATIENT
Start: 2024-08-30

## 2024-08-30 RX ORDER — OLOPATADINE HYDROCHLORIDE 1 MG/ML
1 SOLUTION/ DROPS OPHTHALMIC 2 TIMES DAILY
Qty: 5 ML | Refills: 0 | Status: SHIPPED | OUTPATIENT
Start: 2024-08-30 | End: 2024-09-11

## 2024-08-30 NOTE — PROGRESS NOTES
Assessment & Plan     (H04.123) Dry eyes  (primary encounter diagnosis)  Comment: Patient is experiencing itchy eyes and uses many products for dry eyes like refresh and sooth nighttime ointment that are specifically for dry eyes  Plan: Adult Allergy/Asthma  Referral, Study         - Systane Nighttime Dry Eye Relief, IDS# [id number],         Ointment, olopatadine (PATANOL) 0.1 %         ophthalmic solution        Prescription sent to pharmacy                Subjective   Kierra is a 79 year old, presenting for the following health issues: Pt is experiencing itchy eyes, using many products for dry eyes like refresh and soothe nighttime eye ointment that are specifically for dry eyes. Pt denies any mucus buildup in the corner of her eyes upon wakening. Pt denies any chemicals coming into contact with her eyes. Pt denies any fevers nightsweats or chills. Pt admits to frequent urination, and frequent UTIs, of denies constipation, no exposure to eye irritants.  Patient uses estradiol cream to reduce her frequency of UTIs.    Provided a allergy referral for her.  Patient has not had a history of allergies but her states that she is even having itchy skin this summer.  Provided a prescription for eye ointment and for olopatadine antihistamine eyedrops.  Eye Problem        8/30/2024     3:10 PM   Additional Questions   Roomed by Callie   Accompanied by Self     History of Present Illness       Reason for visit:  Allergies  Symptom onset:  3-4 weeks ago  Symptom intensity:  Moderate  Symptom progression:  Staying the same  Had these symptoms before:  No  What makes it worse:  No  What makes it better:  No   She is taking medications regularly.                 Review of Systems  Constitutional, HEENT, cardiovascular, pulmonary, gi and gu systems are negative, except as otherwise noted.      Objective    /68 (BP Location: Left arm, Patient Position: Sitting, Cuff Size: Adult Regular)   Pulse 94   Temp 97.1  F (36.2  C)  "(Tympanic)   Resp 16   Ht 1.664 m (5' 5.5\")   LMP  (LMP Unknown)   SpO2 96%   BMI 27.70 kg/m    Body mass index is 27.7 kg/m .  Physical Exam   GENERAL: alert and no distress  EYES: Eyes grossly normal to inspection, eyelids-slightly erythematous on the upper eyelid that patient reports is itchy, and pupils- PERRLA  HENT: ear canals and TM's normal, nose and mouth without ulcers or lesions  NECK: no adenopathy, no asymmetry, masses, or scars  RESP: lungs clear to auscultation - no rales, rhonchi or wheezes  CV: regular rate and rhythm, normal S1 S2, no S3 or S4, no murmur, click or rub, no peripheral edema  ABDOMEN: soft, nontender, no hepatosplenomegaly, no masses and bowel sounds normal  MS: no gross musculoskeletal defects noted, no edema  SKIN: no suspicious lesions or rashes  NEURO: Normal strength and tone, mentation intact and speech normal  PSYCH: mentation appears normal, affect normal/bright            Signed Electronically by: TERRENCE Messer CNP    "

## 2024-08-30 NOTE — PATIENT INSTRUCTIONS
Cetirizine (Zyrtec) or loratidine (Claritin) vboth are antihistamines that are nondrowsy.    Olopatadine is an antihistamine eyedrop that will help itchy eyes.

## 2024-09-07 ENCOUNTER — MYC MEDICAL ADVICE (OUTPATIENT)
Dept: INTERNAL MEDICINE | Facility: CLINIC | Age: 79
End: 2024-09-07
Payer: COMMERCIAL

## 2024-09-09 NOTE — PROGRESS NOTES
SUBJECTIVE:     I spent a total of 25 minutes on the care of Kierra on the day of service including 20 minutes of face-to-face time with remainder in chart review, care coordination, documentation on the day of service.                                                  Kierra Mcmullen is a 79 year old female who presents to clinic today for the following health issue(s):  Patient presents with:  Pessary Check/Fit/Insert: LOV 7/10/24    Patient is a 79-year-old  who presents for pessary evaluation.  She notes that the pessary has been working adequately.  She does notice that it tends to move around and  has been taking the pessaries out and putting them back in.  She is a patient that has had 2 pessaries which seem to function the best for her.    On examination the larger ring with diaphragm was removed and cleaned.  Evaluation for the Gellhorn was then done did not appear to be still in the vagina.  Speculum was placed in and the vaginal mucosa is normal and healthy but there is no Gellhorn to be seen.    Oddly enough the patient's have been having trouble with their toilet.  They note that they have flushed multiple different times and tried a plunger multiple times and does not seem to be working for the last couple weeks.  I suggest that very likely could have a Gellhorn in the U trap of the the toilet.  They should probably be calling a .    As noted the examination shows that the vaginal mucosa is healthy and normal discharge  Vaginal ring was replaced.  Should they were also given a spare Gellhorn 2-1/4 to be used if that indeed seems to make it better and her  is able to a place that without difficulty.    She is planning to have surgery at [facility] here coming up in approximately 7 weeks.  That may be indeed the end of her need for pessaries.  Plan is not schedule any further appointments unless needed    Assessment:  Pelvic relaxation with history of pessary use having used 2  pessaries at a time.  Currently she only has 1 in the vagina and was unaware that the other 1 may have fallen out.    No LMP recorded (lmp unknown). Patient has had a hysterectomy..     Patient is not sexually active, .  Using hysterectomy and not sexually active for contraception.    reports that she quit smoking about 37 years ago. Her smoking use included cigarettes. She started smoking about 62 years ago. She has a 25 pack-year smoking history. She has been exposed to tobacco smoke. She has never used smokeless tobacco.    STD testing offered?  Declined    Health maintenance updated:  no    Today's PHQ-2 Score:       2024     1:20 PM   PHQ-2 (  [employer])   Q1: Little interest or pleasure in doing things 0   Q2: Feeling down, depressed or hopeless 0   PHQ-2 Score 0   Q1: Little interest or pleasure in doing things Not at all   Q2: Feeling down, depressed or hopeless Not at all   PHQ-2 Score 0     Today's PHQ-9 Score:        No data to display              Today's GWEN-7 Score:        No data to display                Problem list and histories reviewed & adjusted, as indicated.  Additional history: as documented.    Patient Active Problem List   Diagnosis    Obesity    Prolapse of vaginal wall    Recurrent UTI    Postmenopausal bleeding    Cystocele, midline    Enterocele    Hypothyroidism    Osteopenia    Adenomatous polyp of colon    Degenerative arthritis of knee    Pyuria, sterile    Greater trochanteric bursitis of left hip    History of total knee arthroplasty, left     Past Surgical History:   Procedure Laterality Date    ARTHROPLASTY KNEE Left 2015    Procedure: ARTHROPLASTY KNEE;  Surgeon: Daniel Mack MD;  Location:  OR    CHOLECYSTECTOMY      COLONOSCOPY N/A 2016    Procedure: COLONOSCOPY;  Surgeon: Sage Berg MD;  Location:  GI    CYSTOSCOPY  2012    Procedure:CYSTOSCOPY; Surgeon:GAMA GRIDER; Location: OR    DAVINCI HYSTERECTOMY SUPRACERVICAL,  SACROCOLPOPEXY, COMBINED  2012    Procedure:COMBINED DAVINCI HYSTERECTOMY SUPRACERVICAL, SACROCOLPOPEXY; DAVINCI LAPAROSCOPIC SUPRACERVICAL HYSTERECTOMY, BILATERAL SALPINGO-OOPHORECTOMY, SACROCOLPOPEXY  WITH COLOPLASTY MESH AND CYSTOPLASTY; Surgeon:GAMA GRIDER; Location:[facility] OR    HC REMOVAL OF TONSILS,<13 Y/O      Tonsils <12y.o.    HC TOOTH EXTRACTION W/FORCEP      ZZC NONSPECIFIC PROCEDURE      Cholecystectomy -- Abstracted 02    ZZHC COLONOSCOPY THRU STOMA, DIAGNOSTIC        Social History     Tobacco Use    Smoking status: Former     Current packs/day: 0.00     Average packs/day: 1 pack/day for 25.0 years (25.0 ttl pk-yrs)     Types: Cigarettes     Start date: 1962     Quit date: 1987     Years since quittin.7     Passive exposure: Past    Smokeless tobacco: Never    Tobacco comments:        Substance Use Topics    Alcohol use: Yes     Alcohol/week: 10.0 standard drinks of alcohol     Comment: 1-2 glasses of wine/week      Problem (# of Occurrences) Relation (Name,Age of Onset)    Alzheimer Disease (1) Father    Arthritis (1) Mother (Rossana Milner): RA    Cancer (1) Mother (Rossana Milner):  non-Hodgkins lymphoma    Cardiovascular (1) Maternal Aunt: MI    Gastrointestinal Disease (2) Mother (Rossana Milner): UC, Sister (Ca Russell): diverticulitis    Osteoporosis (2) Mother (Rossana Milner), Sister (Ca Russell)    Thyroid Disease (1) Mother (Rossana Milner)    Breast Cancer (1) Paternal Aunt    Bartter's syndrome (1) Sister (Ca Russell)    Other Cancer (1) Mother (Rossana Milner): Non-Hodgkins Lymphoma              Current Outpatient Medications   Medication Sig Dispense Refill    Biotin 5000 MCG CAPS       CALCIUM + D 600-200 MG-IU OR TABS 1 TABLET DAILY 30 0    calcium polycarbophil (FIBERCON) 625 MG tablet Take 2 tablets by mouth daily      COMPOUNDED NON-CONTROLLED SUBSTANCE (CMPD RX) - PHARMACY TO MIX COMPOUNDED MEDICATION Estradiol 0.02% in HRT heavy (cream base)  Place  "in tube and include vaginal applicator Si/2 gm intravaginally at HS on Mon and Thurs pm as directed 42 g 3    CRANBERRY-VITAMIN C-D MANNOSE PO       D-Mannose 500 MG CAPS Take 500 mg by mouth      GLUCOSAMINE CHONDROITIN COMPLX PO Take 1 tablet by mouth daily 750/600      levothyroxine (SYNTHROID/LEVOTHROID) 75 MCG tablet Take Levothyroxine 75 mcg PO daily 90 tablet 3    MULTI-DAY OR TABS one PO QD      Oxyquinolone sulfate (TRIMO-YU) 0.025 % GEL Place 1 Dose vaginally daily as needed 113 g 3    Study - Systane Nighttime Dry Eye Relief, IDS# [id number], Ointment Place into both eyes See Admin Instructions. Use as directed per protocol 3.5 g 1    sulfamethoxazole-trimethoprim (BACTRIM DS) 800-160 MG tablet Take 1 tablet by mouth 2 times daily 14 tablet 0     No current facility-administered medications for this visit.     No Known Allergies        OBJECTIVE:     /74   Ht 1.664 m (5' 5.5\")   Wt 82.1 kg (181 lb)   LMP  (LMP Unknown)   BMI 29.66 kg/m    Body mass index is 29.66 kg/m .    Exam:  See above for examination    In-Clinic Test Results:  No results found for this or any previous visit (from the past 24 hour(s)).    ASSESSMENT/PLAN:                                                      See above        Nitish Fowler MD  [facility]    "

## 2024-09-11 ENCOUNTER — OFFICE VISIT (OUTPATIENT)
Dept: OBGYN | Facility: CLINIC | Age: 79
End: 2024-09-11
Payer: COMMERCIAL

## 2024-09-11 VITALS
DIASTOLIC BLOOD PRESSURE: 74 MMHG | BODY MASS INDEX: 29.09 KG/M2 | HEIGHT: 66 IN | SYSTOLIC BLOOD PRESSURE: 120 MMHG | WEIGHT: 181 LBS

## 2024-09-11 DIAGNOSIS — N81.89 PELVIC RELAXATION: Primary | ICD-10-CM

## 2024-09-11 PROCEDURE — G2211 COMPLEX E/M VISIT ADD ON: HCPCS | Performed by: OBSTETRICS & GYNECOLOGY

## 2024-09-11 PROCEDURE — 99213 OFFICE O/P EST LOW 20 MIN: CPT | Performed by: OBSTETRICS & GYNECOLOGY

## 2024-09-11 NOTE — NURSING NOTE
"Chief Complaint   Patient presents with    Pessary Check/Fit/Insert     LOV 7/10/24       Initial /74   Ht 1.664 m (5' 5.5\")   Wt 82.1 kg (181 lb)   LMP  (LMP Unknown)   BMI 29.66 kg/m   Estimated body mass index is 29.66 kg/m  as calculated from the following:    Height as of this encounter: 1.664 m (5' 5.5\").    Weight as of this encounter: 82.1 kg (181 lb).  BP completed using cuff size: regular    Questioned patient about current smoking habits.  Pt. has never smoked.          The following HM Due: NONE    Doing well  Has Gelhorn and vaginal ring w/ diaphragm    Beryl Reyes CMA on 2024 at 9:35 AM         "

## 2024-09-17 ENCOUNTER — TRANSFERRED RECORDS (OUTPATIENT)
Dept: HEALTH INFORMATION MANAGEMENT | Facility: CLINIC | Age: 79
End: 2024-09-17
Payer: COMMERCIAL

## 2024-09-19 ENCOUNTER — MYC MEDICAL ADVICE (OUTPATIENT)
Dept: INTERNAL MEDICINE | Facility: CLINIC | Age: 79
End: 2024-09-19
Payer: COMMERCIAL

## 2024-10-16 ENCOUNTER — TELEPHONE (OUTPATIENT)
Dept: INTERNAL MEDICINE | Facility: CLINIC | Age: 79
End: 2024-10-16
Payer: COMMERCIAL

## 2024-10-16 DIAGNOSIS — Z01.818 PREOP GENERAL PHYSICAL EXAM: Primary | ICD-10-CM

## 2024-10-16 DIAGNOSIS — R82.71 BACTERIURIA: ICD-10-CM

## 2024-10-16 DIAGNOSIS — N81.10 PROLAPSE OF VAGINAL WALL: ICD-10-CM

## 2024-10-16 NOTE — TELEPHONE ENCOUNTER
Order/Referral Request    Who is requesting: [place]     Orders being requested: Preoperative Urine Culture    Reason service is needed/diagnosis: surgery 11.12.24 - test to be 10 to 14 days before surgery    When are orders needed by: ASAP    Has this been discussed with Provider: No    Does patient have a preference on a Group/Provider/Facility? [facility]    Does patient have an appointment scheduled?: No    Where to send orders: Fax: [phone] [facility]    Could we send this information to you in [employer] or would you prefer to receive a phone call?:   Patient would prefer a phone call   Okay to leave a detailed message?: Yes at Home number on file [phone] (home)

## 2024-10-17 NOTE — TELEPHONE ENCOUNTER
Faxed order to [facility] as indicated below.     Called patient's phone #. Spoke with spouse and informed.

## 2024-11-01 LAB — BACTERIA UR CULT: NORMAL

## 2025-01-13 ENCOUNTER — NURSE TRIAGE (OUTPATIENT)
Dept: INTERNAL MEDICINE | Facility: CLINIC | Age: 80
End: 2025-01-13
Payer: COMMERCIAL

## 2025-01-13 NOTE — TELEPHONE ENCOUNTER
Patient reports vomiting and diarrhea today. Reports vomiting 5x already, very nauseous. Had 1x instance of diarrhea. Not drinking fluids, maybe sips but unable to keep anything down. Recommended ED/UC for IV fluid evaluation. Patient reports she is in [place], writer advised even more important for evaluation.     Reason for Disposition   MODERATE vomiting (e.g., 3 - 5 times/day) and age > 60 years    Additional Information   Negative: Shock suspected (e.g., cold/pale/clammy skin, too weak to stand, low BP, rapid pulse)   Negative: Difficult to awaken or acting confused (e.g., disoriented, slurred speech)   Negative: Sounds like a life-threatening emergency to the triager   Negative: Vomiting red blood or black (coffee ground) material   Negative: Vomiting and hernia is more painful or swollen than usual   Negative: Recent head injury (within 3 days)   Negative: Recent abdominal injury (within 7 days)   Negative: Insulin-dependent diabetes and glucose > 240 mg/dL (13 mmol/L)   Negative: Severe pain in one eye   Negative: SEVERE vomiting (e.g., 6 or more times/day)  (Exception: Patient sounds well, is drinking liquids, does not sound dehydrated, and vomiting has lasted less than 24 hours.)    Protocols used: Vomiting-A-OH

## 2025-04-14 NOTE — TELEPHONE ENCOUNTER
Test Results    Contacts         Type Contact Phone/Fax    06/07/2024 09:12 AM CDT Phone (Incoming) Kierra Mcmullen (Self) [phone] (H)            Who ordered the test:  PCP    Type of test: Lab    Date of test:  06/04/2024    Where was the test performed:  [facility]    What are your questions/concerns?:  Patient wants to know if she has an infection. She stated that the culture came back abnormal. Does she need antibiotics?    Could we send this information to you in [email or web address] or would you prefer to receive a phone call?:   Patient would prefer a phone call   Okay to leave a detailed message?: Yes at Other phone number:  [phone]   Refill Decision Note   Willie Liao  is requesting a refill authorization.  Brief Assessment and Rationale for Refill:  Approve     Medication Therapy Plan:         Comments:     Note composed:7:27 PM 04/13/2025

## 2025-04-21 ENCOUNTER — ANCILLARY PROCEDURE (OUTPATIENT)
Dept: BONE DENSITY | Facility: CLINIC | Age: 80
End: 2025-04-21
Attending: INTERNAL MEDICINE
Payer: COMMERCIAL

## 2025-04-21 DIAGNOSIS — Z78.0 MENOPAUSE: ICD-10-CM

## 2025-04-21 PROCEDURE — 77080 DXA BONE DENSITY AXIAL: CPT | Mod: TC | Performed by: PHYSICIAN ASSISTANT

## 2025-04-29 SDOH — HEALTH STABILITY: PHYSICAL HEALTH: ON AVERAGE, HOW MANY DAYS PER WEEK DO YOU ENGAGE IN MODERATE TO STRENUOUS EXERCISE (LIKE A BRISK WALK)?: 4 DAYS

## 2025-04-29 SDOH — HEALTH STABILITY: PHYSICAL HEALTH: ON AVERAGE, HOW MANY MINUTES DO YOU ENGAGE IN EXERCISE AT THIS LEVEL?: 50 MIN

## 2025-04-29 ASSESSMENT — SOCIAL DETERMINANTS OF HEALTH (SDOH): HOW OFTEN DO YOU GET TOGETHER WITH FRIENDS OR RELATIVES?: MORE THAN THREE TIMES A WEEK

## 2025-04-30 ENCOUNTER — OFFICE VISIT (OUTPATIENT)
Dept: INTERNAL MEDICINE | Facility: CLINIC | Age: 80
End: 2025-04-30
Attending: INTERNAL MEDICINE
Payer: COMMERCIAL

## 2025-04-30 VITALS
HEIGHT: 65 IN | BODY MASS INDEX: 30.32 KG/M2 | WEIGHT: 182 LBS | TEMPERATURE: 98.1 F | HEART RATE: 63 BPM | SYSTOLIC BLOOD PRESSURE: 132 MMHG | DIASTOLIC BLOOD PRESSURE: 69 MMHG | RESPIRATION RATE: 18 BRPM | OXYGEN SATURATION: 98 %

## 2025-04-30 DIAGNOSIS — S32.10XD CLOSED FRACTURE OF SACRUM WITH ROUTINE HEALING, UNSPECIFIED PORTION OF SACRUM, SUBSEQUENT ENCOUNTER: ICD-10-CM

## 2025-04-30 DIAGNOSIS — Z00.00 ENCOUNTER FOR PREVENTATIVE ADULT HEALTH CARE EXAMINATION: Primary | ICD-10-CM

## 2025-04-30 DIAGNOSIS — E03.9 ACQUIRED HYPOTHYROIDISM: ICD-10-CM

## 2025-04-30 DIAGNOSIS — E07.9 THYROID DYSFUNCTION: ICD-10-CM

## 2025-04-30 LAB
ALBUMIN SERPL BCG-MCNC: 4.1 G/DL (ref 3.5–5.2)
ALBUMIN UR-MCNC: NEGATIVE MG/DL
ALP SERPL-CCNC: 143 U/L (ref 40–150)
ALT SERPL W P-5'-P-CCNC: 14 U/L (ref 0–50)
ANION GAP SERPL CALCULATED.3IONS-SCNC: 10 MMOL/L (ref 7–15)
APPEARANCE UR: CLEAR
AST SERPL W P-5'-P-CCNC: 23 U/L (ref 0–45)
BACTERIA #/AREA URNS HPF: ABNORMAL /HPF
BILIRUB SERPL-MCNC: 0.4 MG/DL
BILIRUB UR QL STRIP: NEGATIVE
BUN SERPL-MCNC: 18 MG/DL (ref 8–23)
CALCIUM SERPL-MCNC: 10.2 MG/DL (ref 8.8–10.4)
CHLORIDE SERPL-SCNC: 104 MMOL/L (ref 98–107)
CHOLEST SERPL-MCNC: 178 MG/DL
COLOR UR AUTO: YELLOW
CREAT SERPL-MCNC: 0.92 MG/DL (ref 0.51–0.95)
EGFRCR SERPLBLD CKD-EPI 2021: 63 ML/MIN/1.73M2
FASTING STATUS PATIENT QL REPORTED: YES
FASTING STATUS PATIENT QL REPORTED: YES
GLUCOSE SERPL-MCNC: 97 MG/DL (ref 70–99)
GLUCOSE UR STRIP-MCNC: NEGATIVE MG/DL
HCO3 SERPL-SCNC: 27 MMOL/L (ref 22–29)
HDLC SERPL-MCNC: 65 MG/DL
HGB UR QL STRIP: NEGATIVE
KETONES UR STRIP-MCNC: NEGATIVE MG/DL
LDLC SERPL CALC-MCNC: 93 MG/DL
LEUKOCYTE ESTERASE UR QL STRIP: ABNORMAL
NITRATE UR QL: NEGATIVE
NONHDLC SERPL-MCNC: 113 MG/DL
PH UR STRIP: 6 [PH] (ref 5–7)
POTASSIUM SERPL-SCNC: 4.4 MMOL/L (ref 3.4–5.3)
PROT SERPL-MCNC: 7.7 G/DL (ref 6.4–8.3)
RBC #/AREA URNS AUTO: ABNORMAL /HPF
SODIUM SERPL-SCNC: 141 MMOL/L (ref 135–145)
SP GR UR STRIP: 1.01 (ref 1–1.03)
SQUAMOUS #/AREA URNS AUTO: ABNORMAL /LPF
TRIGL SERPL-MCNC: 98 MG/DL
TSH SERPL DL<=0.005 MIU/L-ACNC: 1.38 UIU/ML (ref 0.3–4.2)
UROBILINOGEN UR STRIP-ACNC: 0.2 E.U./DL
WBC #/AREA URNS AUTO: ABNORMAL /HPF
WBC CLUMPS #/AREA URNS HPF: PRESENT /HPF

## 2025-04-30 RX ORDER — NAPROXEN 250 MG/1
250 TABLET ORAL 2 TIMES DAILY WITH MEALS
COMMUNITY

## 2025-04-30 RX ORDER — LEVOTHYROXINE SODIUM 75 UG/1
TABLET ORAL
Qty: 90 TABLET | Refills: 3 | Status: SHIPPED | OUTPATIENT
Start: 2025-04-30

## 2025-04-30 ASSESSMENT — PAIN SCALES - GENERAL: PAINLEVEL_OUTOF10: MILD PAIN (2)

## 2025-04-30 NOTE — PROGRESS NOTES
"  Assessment & Plan     Closed fracture of sacrum with routine healing, unspecified portion of sacrum, subsequent encounter  Refer to PT  Assess lab work   Continue Miacalcin  Follow up with endocrinology for treatment with Reclast   - Physical Therapy  Referral; Future  - UA with Microscopic reflex to Culture - lab collect  - Albumin Random Urine Quantitative with Creat Ratio  - UA Microscopic with Reflex to Culture  - Urine Culture  - OFFICE/OUTPT VISIT,EST,LEVL III    Encounter for preventative adult health care examination  advised regular aerobic activity, low cholesterol, low salt diet, wearing seat belt,  self examinations, sunscreen protection.Obtain screening cholesterol, immunizations reviewed.    - Lipid panel reflex to direct LDL Fasting  - Comprehensive metabolic panel (BMP + Alb, Alk Phos, ALT, AST, Total. Bili, TP)  - TSH with free T4 reflex  - UA with Microscopic reflex to Culture - lab collect  - Albumin Random Urine Quantitative with Creat Ratio  - UA Microscopic with Reflex to Culture  - Urine Culture    Acquired hypothyroidism  On synthroid  Monitor lab   - TSH with free T4 reflex  - OFFICE/OUTPT VISIT,EST,LEVL III    Thyroid dysfunction    - levothyroxine (SYNTHROID/LEVOTHROID) 75 MCG tablet; Take Levothyroxine 75 mcg PO daily    Patient has been advised of split billing requirements and indicates understanding: Yes        BMI  Estimated body mass index is 30.52 kg/m  as calculated from the following:    Height as of this encounter: 1.645 m (5' 4.75\").    Weight as of this encounter: 82.6 kg (182 lb).   Weight management plan: Discussed healthy diet and exercise guidelines    Counseling  Appropriate preventive services were addressed with this patient via screening, questionnaire, or discussion as appropriate for fall prevention, nutrition, physical activity, Tobacco-use cessation, social engagement, weight loss and cognition.  Checklist reviewing preventive services available has been " given to the patient.  Reviewed patient's diet, addressing concerns and/or questions.   She is at risk for psychosocial distress and has been provided with information to reduce risk.       Follow-up    Follow-up Visit   Expected date:  Apr 30, 2026 (Approximate)      Follow Up Appointment Details:     Follow-up with whom?: Me    Follow-Up for what?: Adult Preventive    How?: In Person                 Filiberto Lozada is a 80 year old, presenting for the following health issues:  RECHECK        4/30/2025     8:14 AM   Additional Questions   Roomed by Nasima GOULD   Accompanied by n/a     HPI      Patient had developed LBP while in [place].   Seen in UC, had hip and LS spine XR, MRI.   Has sacral insufficiency compression fracture.   Has been using a walker for the past month, now started using a cane.   Pain improved. Uses PRN Naproxen.   Seen rheumatology, had DEXA scan, findings of osteopenia, slightly improved from previus DEXA.   Started on Miacalcin and offered Reclast treatment.     Has history of hypothyroidism. On replacement treatment with Synthroid. No heat /cold intolerance, heart palpitations, weight loss/ gain ,  change in bowel habits.    Recheck medication    Annual Wellness Visit     Patient has been advised of split billing requirements and indicates understanding: Yes       Health Care Directive    Document on file is a Health Care Directive or POLST.      4/29/2025   General Health   How would you rate your overall physical health? Good   Feel stress (tense, anxious, or unable to sleep) To some extent          4/29/2025   Nutrition   Diet: Regular (no restrictions)         4/29/2025   Exercise   Days per week of moderate/strenous exercise 4 days   Average minutes spent exercising at this level 50 min           4/29/2025   Social Factors   Frequency of gathering with friends or relatives More than three times a week   Worry food won't last until get money to buy more No   Food not last or not have enough  money for food? No   Do you have housing? (Housing is defined as stable permanent housing and does not include staying outside in a car, in a tent, in an abandoned building, in an overnight shelter, or couch-surfing.) No   Are you worried about losing your housing? No   Lack of transportation? No   Unable to get utilities (heat,electricity)? No   Want help with housing or utility concern? No   (!) HOUSING CONCERN PRESENT        2025   Fall Risk   Fallen 2 or more times in the past year? No   Trouble with walking or balance? No          2025   Activities of Daily Living- Home Safety   Needs help with the following daily activites None of the above   Safety concerns in the home None of the above         2025   Dental   Dentist two times every year? Yes         2025   Hearing Screening   Hearing concerns? None of the above         2025   Driving Risk Screening   Patient/family members have concerns about driving No         2025   General Alertness/Fatigue Screening   Have you been more tired than usual lately? No         2025   Urinary Incontinence Screening   Bothered by leaking urine in past 6 months No         2024   TB Screening   Were you born outside of the [place]? No         2025   Substance Use   Alcohol more than 3/day or more than 7/wk No   Do you have a current opioid prescription? No   How severe/bad is pain from 1 to 10? 5/10   Do you use any other substances recreationally? No     Social History     Tobacco Use    Smoking status: Former     Current packs/day: 0.00     Average packs/day: 1 pack/day for 25.0 years (25.0 ttl pk-yrs)     Types: Cigarettes     Start date: 1962     Quit date: 1987     Years since quittin.3     Passive exposure: Past    Smokeless tobacco: Never    Tobacco comments:        Vaping Use    Vaping status: Never Used   Substance Use Topics    Alcohol use: Yes     Alcohol/week: 10.0 standard drinks of alcohol     Comment: 1-2  glasses of wine/week    Drug use: No         Today's PHQ-2 Score:       4/29/2025     9:59 AM   PHQ-2 ( 1999 [employer])   Q1: Little interest or pleasure in doing things 0   Q2: Feeling down, depressed or hopeless 0   PHQ-2 Score 0    Q1: Little interest or pleasure in doing things Not at all   Q2: Feeling down, depressed or hopeless Not at all   PHQ-2 Score 0       Patient-reported           6/17/2024   LAST FHS-7 RESULTS   1st degree relative breast or ovarian cancer No   Any relative bilateral breast cancer No   Any male have breast cancer No   Any ONE woman have BOTH breast AND ovarian cancer No   Any woman with breast cancer before 50yrs No   2 or more relatives with breast AND/OR ovarian cancer No   2 or more relatives with breast AND/OR bowel cancer No        Mammogram Screening - After age 74- determine frequency with patient based on health status, life expectancy and patient goals                Reviewed and updated as needed this visit by Provider                    Lab work is in process  Labs reviewed in EPIC    Current providers sharing in care for this patient include:  Patient Care Team:  Matt Whitney MD as PCP - General (Internal Medicine)  Matt Whitney MD as Assigned PCP  Jenna Nguyen PA-C as Physician Assistant (Urology)  Xavier Austin MD as Referring Physician (OB/Gyn)  Jonathan Patel MD as MD (OB/Gyn)  Jonathan Patel MD as MD (OB/Gyn)  Nitish Fowler MD as Physician (OB/Gyn)  Nitish Fowler MD as Assigned OBGYN Provider  Jonathan Hernandez MD as MD (Allergy & Immunology)    The following health maintenance items are reviewed in Epic and correct as of today:  Health Maintenance   Topic Date Due    TSH W/FREE T4 REFLEX  04/30/2025    ANNUAL REVIEW OF HM ORDERS  04/30/2025    MAMMO SCREENING  06/17/2025    MEDICARE ANNUAL WELLNESS VISIT  04/30/2026    FALL RISK ASSESSMENT  04/30/2026    COLORECTAL CANCER SCREENING  06/01/2026    DIABETES SCREENING  06/04/2027     "LIPID  04/30/2029    ADVANCE CARE PLANNING  04/30/2029    DTAP/TDAP/TD IMMUNIZATION (3 - Td or Tdap) 02/28/2033    DEXA  04/21/2040    PHQ-2 (once per calendar year)  Completed    INFLUENZA VACCINE  Completed    Pneumococcal Vaccine: 50+ Years  Completed    ZOSTER IMMUNIZATION  Completed    RSV VACCINE  Completed    COVID-19 Vaccine  Completed    HPV IMMUNIZATION  Aged Out    MENINGITIS IMMUNIZATION  Aged Out       Appropriate preventive services were discussed with this patient, including applicable screening as appropriate for fall prevention, nutrition, physical activity, Tobacco-use cessation, weight loss and cognition.  Checklist reviewing preventive services available has been given to the patient.           4/30/2025   Mini Cog   Clock Draw Score 2 Normal   3 Item Recall 3 objects recalled   Mini Cog Total Score 5                Review of Systems  Constitutional, HEENT, cardiovascular, pulmonary, GI, , musculoskeletal, neuro, skin, endocrine and psych systems are negative, except as otherwise noted.      Objective    /69 (BP Location: Left arm, Cuff Size: Adult Regular)   Pulse 63   Temp 98.1  F (36.7  C) (Tympanic)   Resp 18   Ht 1.645 m (5' 4.75\")   Wt 82.6 kg (182 lb)   LMP  (LMP Unknown)   SpO2 98%   BMI 30.52 kg/m    Body mass index is 30.52 kg/m .  Physical Exam   GENERAL: alert and no distress  EYES: Eyes grossly normal to inspection, PERRL and conjunctivae and sclerae normal  HENT: ear canals and TM's normal, nose and mouth without ulcers or lesions  NECK: no adenopathy, no asymmetry, masses, or scars  RESP: lungs clear to auscultation - no rales, rhonchi or wheezes  CV: regular rate and rhythm, normal S1 S2, no S3 or S4, no murmur, click or rub, no peripheral edema  ABDOMEN: soft, nontender, no hepatosplenomegaly, no masses and bowel sounds normal  MS: no gross musculoskeletal defects noted, no edema  SKIN: no suspicious lesions or rashes  NEURO: Normal strength and tone, mentation " intact and speech normal  PSYCH: mentation appears normal, affect normal/bright    Office Visit on 09/11/2024   Component Date Value Ref Range Status    Culture 10/31/2024 10,000-50,000 CFU/mL Mixture of Urogenital Gill   Final           Signed Electronically by: Matt Whitney MD

## 2025-05-01 LAB
BACTERIA UR CULT: ABNORMAL
BACTERIA UR CULT: ABNORMAL
CREAT UR-MCNC: 63 MG/DL
MICROALBUMIN UR-MCNC: <12 MG/L
MICROALBUMIN/CREAT UR: NORMAL MG/G{CREAT}

## 2025-05-09 NOTE — PROGRESS NOTES
SUBJECTIVE:                                                       Kierra Mcmullen is a 80 year old female who presents to clinic today for the following health issue(s):  pessary  No chief complaint on file.      I spent a total of 15 minutes in the care of Irish on the day of service including 10 minutes of face-to-face time with remainder in chart review, care coordination, documentation on the day of service.      Patient is an 80-year-old P2 who had last fall pelvic support procedure at [facility].  She is very happy with the results.  She still does have from time to time urinary frequency and on occasion has had what appears to be a bladder infection.  Most recent UTI was in April of this year.  She just has symptoms of urinary frequency and a UA is pending.    She also asks about bone integrity is she noted that she had microfractures of the pelvis and exquisite pain when she was in [place].  We talked about bone density which evidently has improved with her as well as taking calcium vitamin D and exercise.    She also asked about libido and in this case to suggest that medications are not something she is inclined to have anyway and suggest psychosocial interactions to potentially increase libido.    Examination is deferred    Assessment is potential bladder infection    Plan UA is pending and then return to clinic as needed    No LMP recorded (lmp unknown). Patient has had a hysterectomy..     Patient is sexually active, .  Using hysterectomy for contraception.    reports that she quit smoking about 38 years ago. Her smoking use included cigarettes. She started smoking about 63 years ago. She has a 25 pack-year smoking history. She has been exposed to tobacco smoke. She has never used smokeless tobacco.    STD testing offered?  Declined    Health maintenance updated:  no    Today's PHQ-2 Score:       2025     9:59 AM   PHQ-2 (  [employer])   Q1: Little interest or pleasure in doing things 0   Q2:  Feeling down, depressed or hopeless 0   PHQ-2 Score 0    Q1: Little interest or pleasure in doing things Not at all   Q2: Feeling down, depressed or hopeless Not at all   PHQ-2 Score 0       Patient-reported     Today's PHQ-9 Score:        No data to display              Today's GWEN-7 Score:        No data to display                Problem list and histories reviewed & adjusted, as indicated.  Additional history: as documented.    Patient Active Problem List   Diagnosis    Obesity    Prolapse of vaginal wall    Recurrent UTI    Postmenopausal bleeding    Cystocele, midline    Enterocele    Hypothyroidism    Osteopenia    Adenomatous polyp of colon    Degenerative arthritis of knee    Pyuria, sterile    Greater trochanteric bursitis of left hip    History of total knee arthroplasty, left     Past Surgical History:   Procedure Laterality Date    ARTHROPLASTY KNEE Left 5/19/2015    Procedure: ARTHROPLASTY KNEE;  Surgeon: Daniel Mack MD;  Location:  OR    CHOLECYSTECTOMY      COLONOSCOPY N/A 5/5/2016    Procedure: COLONOSCOPY;  Surgeon: Sage Berg MD;  Location:  GI    CYSTOSCOPY  1/31/2012    Procedure:CYSTOSCOPY; Surgeon:GAMA GRIDER; Location: OR    DAVINCI HYSTERECTOMY SUPRACERVICAL, SACROCOLPOPEXY, COMBINED  1/31/2012    Procedure:COMBINED DAVINCI HYSTERECTOMY SUPRACERVICAL, SACROCOLPOPEXY; DAVINCI LAPAROSCOPIC SUPRACERVICAL HYSTERECTOMY, BILATERAL SALPINGO-OOPHORECTOMY, SACROCOLPOPEXY  WITH COLOPLASTY MESH AND CYSTOPLASTY; Surgeon:GAMA GRIDER; Location: OR     REMOVAL OF TONSILS,<11 Y/O      Tonsils <12y.o.    HC TOOTH EXTRACTION W/FORCEP      ZZC NONSPECIFIC PROCEDURE      Cholecystectomy -- Abstracted 7/16/02    ZZ COLONOSCOPY THRU STOMA, DIAGNOSTIC  2006      Social History     Tobacco Use    Smoking status: Former     Current packs/day: 0.00     Average packs/day: 1 pack/day for 25.0 years (25.0 ttl pk-yrs)     Types: Cigarettes     Start date: 1/1/1962     Quit date:  1987     Years since quittin.3     Passive exposure: Past    Smokeless tobacco: Never    Tobacco comments:        Substance Use Topics    Alcohol use: Yes     Alcohol/week: 10.0 standard drinks of alcohol     Comment: 1-2 glasses of wine/week      Problem (# of Occurrences) Relation (Name,Age of Onset)    Alzheimer Disease (1) Father    Arthritis (1) Mother (Rossana Milner): RA    Cancer (1) Mother (Rossana Milner):  non-Hodgkins lymphoma    Cardiovascular (1) Maternal Aunt: MI    Gastrointestinal Disease (2) Mother (Rossana Milner): UC, Sister (Ca Russell): diverticulitis    Osteoporosis (2) Mother (Rossana Milner), Sister (Ca Russell)    Thyroid Disease (1) Mother (Rossana Milner)    Breast Cancer (1) Paternal Aunt    Bartter's syndrome (1) Sister (Ca Russell)    Other Cancer (1) Mother (Rossana Milner): Non-Hodgkins Lymphoma              Current Outpatient Medications   Medication Sig Dispense Refill    Biotin 5000 MCG CAPS       CALCIUM + D 600-200 MG-IU OR TABS Take 1 tablet by mouth 2 times daily. 30 0    calcium polycarbophil (FIBERCON) 625 MG tablet Take 2 tablets by mouth daily      COMPOUNDED NON-CONTROLLED SUBSTANCE (CMPD RX) - PHARMACY TO MIX COMPOUNDED MEDICATION Estradiol 0.02% in HRT heavy (cream base)  Place in tube and include vaginal applicator Si/2 gm intravaginally at HS on Mon and Thurs pm as directed 42 g 3    CRANBERRY-VITAMIN C-D MANNOSE PO  (Patient not taking: Reported on 2025)      D-Mannose 500 MG CAPS Take 500 mg by mouth      GLUCOSAMINE CHONDROITIN COMPLX PO Take 1 tablet by mouth 2 times daily. 750/600      levothyroxine (SYNTHROID/LEVOTHROID) 75 MCG tablet Take Levothyroxine 75 mcg PO daily 90 tablet 3    MULTI-DAY OR TABS one PO QD      naproxen (NAPROSYN) 250 MG tablet Take 250 mg by mouth 2 times daily (with meals).      Oxyquinolone sulfate (TRIMO-YU) 0.025 % GEL Place 1 Dose vaginally daily as needed (Patient not taking: Reported on 2025) 113 g 3     Study - Systane Nighttime Dry Eye Relief, IDS# [id number], Ointment Place into both eyes See Admin Instructions. Use as directed per protocol (Patient not taking: Reported on 4/30/2025) 3.5 g 1    sulfamethoxazole-trimethoprim (BACTRIM DS) 800-160 MG tablet Take 1 tablet by mouth 2 times daily (Patient not taking: Reported on 4/30/2025) 14 tablet 0     No current facility-administered medications for this visit.     No Known Allergies        OBJECTIVE:     LMP  (LMP Unknown)   There is no height or weight on file to calculate BMI.    Exam:  Deferred    In-Clinic Test Results:  No results found for this or any previous visit (from the past 24 hours).    ASSESSMENT/PLAN:                                                      See above    Nitish Fowler MD  [facility]

## 2025-05-13 ENCOUNTER — OFFICE VISIT (OUTPATIENT)
Dept: OBGYN | Facility: CLINIC | Age: 80
End: 2025-05-13
Payer: COMMERCIAL

## 2025-05-13 VITALS — WEIGHT: 185 LBS | BODY MASS INDEX: 31.02 KG/M2

## 2025-05-13 DIAGNOSIS — R35.0 URINARY FREQUENCY: Primary | ICD-10-CM

## 2025-05-13 LAB
ALBUMIN UR-MCNC: NEGATIVE MG/DL
APPEARANCE UR: CLEAR
BACTERIA #/AREA URNS HPF: ABNORMAL /HPF
BILIRUB UR QL STRIP: NEGATIVE
COLOR UR AUTO: YELLOW
GLUCOSE UR STRIP-MCNC: NEGATIVE MG/DL
HGB UR QL STRIP: NEGATIVE
KETONES UR STRIP-MCNC: NEGATIVE MG/DL
LEUKOCYTE ESTERASE UR QL STRIP: ABNORMAL
NITRATE UR QL: NEGATIVE
PH UR STRIP: 6.5 [PH] (ref 5–7)
RBC #/AREA URNS AUTO: ABNORMAL /HPF
SP GR UR STRIP: <=1.005 (ref 1–1.03)
SQUAMOUS #/AREA URNS AUTO: ABNORMAL /LPF
UROBILINOGEN UR STRIP-ACNC: 0.2 E.U./DL
WBC #/AREA URNS AUTO: ABNORMAL /HPF

## 2025-05-13 PROCEDURE — 81001 URINALYSIS AUTO W/SCOPE: CPT | Performed by: OBSTETRICS & GYNECOLOGY

## 2025-05-13 PROCEDURE — 87086 URINE CULTURE/COLONY COUNT: CPT | Performed by: OBSTETRICS & GYNECOLOGY

## 2025-05-13 PROCEDURE — 99212 OFFICE O/P EST SF 10 MIN: CPT | Performed by: OBSTETRICS & GYNECOLOGY

## 2025-05-14 ENCOUNTER — RESULTS FOLLOW-UP (OUTPATIENT)
Dept: OBGYN | Facility: CLINIC | Age: 80
End: 2025-05-14

## 2025-05-14 LAB — BACTERIA UR CULT: NO GROWTH

## 2025-05-17 ENCOUNTER — MYC MEDICAL ADVICE (OUTPATIENT)
Dept: INTERNAL MEDICINE | Facility: CLINIC | Age: 80
End: 2025-05-17
Payer: COMMERCIAL

## 2025-05-17 DIAGNOSIS — E55.9 VITAMIN D DEFICIENCY: Primary | ICD-10-CM

## 2025-05-19 NOTE — TELEPHONE ENCOUNTER
Please see patient's mychart message.     Please advise, thanks.    Oleg, Triage ERI Grayson    7:50 AM 5/19/2025

## 2025-05-22 ENCOUNTER — LAB (OUTPATIENT)
Dept: LAB | Facility: CLINIC | Age: 80
End: 2025-05-22
Payer: COMMERCIAL

## 2025-05-22 DIAGNOSIS — E55.9 VITAMIN D DEFICIENCY: ICD-10-CM

## 2025-05-23 ENCOUNTER — TRANSFERRED RECORDS (OUTPATIENT)
Dept: HEALTH INFORMATION MANAGEMENT | Facility: CLINIC | Age: 80
End: 2025-05-23
Payer: COMMERCIAL

## 2025-05-23 ENCOUNTER — TELEPHONE (OUTPATIENT)
Dept: INTERNAL MEDICINE | Facility: CLINIC | Age: 80
End: 2025-05-23
Payer: COMMERCIAL

## 2025-05-23 ENCOUNTER — RESULTS FOLLOW-UP (OUTPATIENT)
Dept: INTERNAL MEDICINE | Facility: CLINIC | Age: 80
End: 2025-05-23

## 2025-05-23 NOTE — TELEPHONE ENCOUNTER
5/7/25 initial evaluation/plan of care for physical therapy recieved via fax. Form in your mailbox for signature.

## 2025-06-20 ENCOUNTER — HOSPITAL ENCOUNTER (OUTPATIENT)
Dept: MAMMOGRAPHY | Facility: CLINIC | Age: 80
Discharge: HOME | End: 2025-06-20
Attending: INTERNAL MEDICINE | Admitting: INTERNAL MEDICINE
Payer: COMMERCIAL

## 2025-06-20 DIAGNOSIS — Z12.31 VISIT FOR SCREENING MAMMOGRAM: ICD-10-CM

## 2025-06-20 PROCEDURE — 77063 BREAST TOMOSYNTHESIS BI: CPT

## (undated) DEVICE — ENDO TRAP POLYP QUICK CATCH 710201

## (undated) DEVICE — KIT ENDO TURNOVER/PROCEDURE W/CLEAN A SCOPE LINERS 103888

## (undated) DEVICE — ENDO SNARE POLYPECTOMY OVAL 15MM LOOP SD-240U-15

## (undated) RX ORDER — SIMETHICONE 40MG/0.6ML
SUSPENSION, DROPS(FINAL DOSAGE FORM)(ML) ORAL
Status: DISPENSED
Start: 2021-06-01

## (undated) RX ORDER — FENTANYL CITRATE 50 UG/ML
INJECTION, SOLUTION INTRAMUSCULAR; INTRAVENOUS
Status: DISPENSED
Start: 2021-06-01